# Patient Record
Sex: MALE | Race: WHITE | Employment: OTHER | URBAN - METROPOLITAN AREA
[De-identification: names, ages, dates, MRNs, and addresses within clinical notes are randomized per-mention and may not be internally consistent; named-entity substitution may affect disease eponyms.]

---

## 2017-01-10 ENCOUNTER — TRANSFERRED RECORDS (OUTPATIENT)
Dept: HEALTH INFORMATION MANAGEMENT | Facility: CLINIC | Age: 82
End: 2017-01-10

## 2017-01-16 ENCOUNTER — DOCUMENTATION ONLY (OUTPATIENT)
Dept: SURGERY | Facility: CLINIC | Age: 82
End: 2017-01-16

## 2017-02-10 ENCOUNTER — TELEPHONE (OUTPATIENT)
Dept: UROLOGY | Facility: CLINIC | Age: 82
End: 2017-02-10

## 2017-02-10 ENCOUNTER — TELEPHONE (OUTPATIENT)
Dept: SURGERY | Facility: CLINIC | Age: 82
End: 2017-02-10

## 2017-02-10 NOTE — TELEPHONE ENCOUNTER
RAN Wolff from [facility] calling.  She reports Jeff has called here a couple of times to get Pelvic Floor test results and he has been referred to his urologist for those.  Dawn Cintron ordered the test.  Per clinic note he was to follow up with Dawn after the test was done.  It's difficult for him to get here, I will check if Dawn can call him with results or if he needs to come see her?

## 2017-02-10 NOTE — TELEPHONE ENCOUNTER
I spoke to Jeff and let him know that he should call down to the colon rectal clinic.it looks like they wanted him to return to clinic for results.  I gave him the # so that he could call that clinic. Milka

## 2017-02-10 NOTE — TELEPHONE ENCOUNTER
Received a call from patient stating he wanted his results from Pelvic Floor Clinic testing.  Stated he contacted Colon Rectal clinic X 2 and was directed to call our office.  Per Colon Rectal Office note;  Patient was to get the testing and then follow up with Colon Rectal clinic regarding result.  Verified in EPIC that the Testing was ordered by Colon Rectal and therefore results would go to that office not Dr Garcia.    Placed call to [facility] Clinic and spoke with Triage and confirmed they will be getting the result and speaking with the ordering provider to determine how they wish to give results to the patient.  Via phone or needed another office appt.    Urology Clinic RN will call and let patient know that Colon Rectal Clinic has confirmed that results will come from them and they will be letting patient know result or needed appt etc.  (unknown by this RN how long the wait will be)    Mariella Aldana RN  [facility]

## 2017-02-10 NOTE — TELEPHONE ENCOUNTER
Reason for Call:  Request for results:    Name of test or procedure: pelvic floor exam    Date of test of procedure: 1/10/17    Location of the test or procedure: ? Specialty clinic down by [facility]    OK to leave the result message on voice mail or with a family member? YES    Phone number Patient can be reached at:  Home number on file [phone] (home)    Additional comments: pt calling stating he is wondering what the result are from his pelvic floor exam that was done on Jan 10th    Call taken on 2/10/2017 at 1:47 PM by Inge Owusu

## 2017-02-10 NOTE — TELEPHONE ENCOUNTER
I spoke to Jeff today. He had been referred to the colorectal surgeon. The colorectal surgeon ordered pelvic floor testing, however when Jeff called the office of the colorectal surgeons and they said that he should call us for the results. We did not see any Documentation in the chart or on Dr Jean Baptiste desk so will try to reach them at the pelvic floor clinic and if this can not be obtained from them then we will question Dr Garcia. Cee SEBASTIAN RN

## 2017-02-27 ENCOUNTER — DOCUMENTATION ONLY (OUTPATIENT)
Dept: SURGERY | Facility: CLINIC | Age: 82
End: 2017-02-27

## 2017-02-27 ENCOUNTER — TELEPHONE (OUTPATIENT)
Dept: SURGERY | Facility: CLINIC | Age: 82
End: 2017-02-27

## 2017-02-27 DIAGNOSIS — K59.02 CONSTIPATION DUE TO OUTLET DYSFUNCTION: Primary | ICD-10-CM

## 2017-02-27 NOTE — TELEPHONE ENCOUNTER
Called pt to relay Dawn Cintron's recommendation regarding his recent pelvic floor testing. Per Dawn due to his inability to fully evacuate he can try biofeedback of dulcolax suppositories. He is also welcome to follow up in clinic if he would like to discuss these options further. Pt states that he is willing to try the biofeedback, though he is not confident that this will be a good solution. Asked pt if he would prefer to come to clinic to discuss this in more detail with Dawn, but he declined. Stated he will try the biofeedback first. Told pt that a referral will be placed for this and that he will receive a call from the [facility] for scheduling. Encouraged pt to call back at any time if he feels like biofeedback is not working and would like to discuss other options. Pt verbalized understanding and is in agreement with plan. Yolanda VERDIN LPN

## 2017-03-14 ENCOUNTER — TELEPHONE (OUTPATIENT)
Dept: SURGERY | Facility: CLINIC | Age: 82
End: 2017-03-14

## 2017-03-14 ENCOUNTER — OFFICE VISIT (OUTPATIENT)
Dept: SURGERY | Facility: CLINIC | Age: 82
End: 2017-03-14

## 2017-03-14 VITALS
SYSTOLIC BLOOD PRESSURE: 153 MMHG | HEIGHT: 72 IN | HEART RATE: 90 BPM | WEIGHT: 216.4 LBS | BODY MASS INDEX: 29.31 KG/M2 | OXYGEN SATURATION: 97 % | DIASTOLIC BLOOD PRESSURE: 88 MMHG

## 2017-03-14 DIAGNOSIS — K59.02 CONSTIPATION DUE TO PELVIC FLOOR OUTLET OBSTRUCTION: Primary | ICD-10-CM

## 2017-03-14 RX ORDER — BISACODYL 10 MG
1 SUPPOSITORY, RECTAL RECTAL DAILY PRN
Qty: 30 SUPPOSITORY | Refills: 3 | Status: SHIPPED | OUTPATIENT
Start: 2017-03-14 | End: 2017-04-26

## 2017-03-14 ASSESSMENT — PAIN SCALES - GENERAL: PAINLEVEL: NO PAIN (0)

## 2017-03-14 NOTE — MR AVS SNAPSHOT
After Visit Summary   3/14/2017    Jeff Plasencia    MRN: [id number]           Patient Information     Date Of Birth          1923        Visit Information        Provider Department      3/14/2017 10:30 AM Dawn Mcpherson APRN 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 Fax to [phone].                  Who to contact     Please call your clinic at [phone] to:    Ask questions about your health    Make or cancel appointments    Discuss your medicines    Learn about your test results    Speak to your doctor   If you have compliments or concerns about an experience at your clinic, or if you wish to file a complaint, please contact [facility] Patient Relations at [phone] or email us at [email or web address]         Additional Information About Your Visit        MyChart Information     [employer] is an electronic gateway that provides easy, online access to your medical records. With [employer], you can request a clinic appointment, read your test results, renew a prescription or communicate with your care team.     To sign up for [employer] visit the website at [email or web address]   You will be asked to enter the access code listed below, as well as some personal information. Please follow the directions to create your username and password.     Your access code is: [email or web address]  Expires: 2017 10:47 AM     Your access code will  in 90 days. If you need help or a new code, please contact your [facility] Clinic or call [phone] for assistance.        Care EveryWhere ID     This is your Care EveryWhere ID. This could be used by other organizations to access your [place]  "medical records  [id number]        Your Vitals Were     Pulse Height Pulse Oximetry BMI (Body Mass Index)          90 5' 11.5\" 97% 29.76 kg/m2         Blood Pressure from Last 3 Encounters:   03/14/17 153/88   12/27/16 (!) 166/113   12/08/16 (!) 199/103    Weight from Last 3 Encounters:   03/14/17 216 lb 6.4 oz   12/27/16 209 lb 9.6 oz   11/22/16 203 lb              We Performed the Following     Pelvic Floor Referral PFC          Today's Medication Changes          These changes are accurate as of: 3/14/17 10:47 AM.  If you have any questions, ask your nurse or doctor.               Start taking these medicines.        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 Please contact your care team if you have questions.     COQ-10 PO   Stopped by:  Dawn Mcpherson APRN CNP                Where to get your medicines      These medications were sent to [facility] [address] [place] [address]  [address] [facility] [address]     Phone:  [phone]     bisacodyl 10 MG Suppository                Primary Care Provider Office Phone # Fax #    Lorenza Smith [phone]       [facility] [address] [facility] [address]        Thank you!     Thank you for choosing [facility] COLON AND RECTAL SURGERY  for your care. Our goal is always to provide you with excellent care. Hearing back from our patients is one way we can continue to improve our services. Please take a few minutes to complete the written survey that you may receive in the mail after your visit with us. Thank you!           "   Your Updated Medication List - Protect others around you: Learn how to safely use, store and throw away your medicines at www.disposemymeds.org.          This list is accurate as of: 3/14/17 10:47 AM.  Always use your most recent med list.                   Brand Name Dispense Instructions for use    aspirin 81 MG tablet     100    1/2 tab daily       bisacodyl 10 MG Suppository    DULCOLAX    30 suppository    Place 1 suppository (10 mg) rectally daily as needed for constipation       glucosamine chondroitin 500 complex Caps      DAILY       LUTEIN PO      Take  by mouth.       mometasone 50 MCG/ACT spray    NASONEX    1 Box    Spray 2 sprays into both nostrils daily       VITAMIN D PO      Take  by mouth.

## 2017-03-14 NOTE — TELEPHONE ENCOUNTER
Called pt to relay Dawn's recommendations to continue both the gatorade and the benfiber. Pt stated understanding of these recommendations. Encouraged pt to contact us with any further questions. Yolanda VERDIN LPN

## 2017-03-14 NOTE — TELEPHONE ENCOUNTER
Jeff is calling for Dawn Cintron.  He reports he was seen today but forgot to ask.  1. PCP instructed him to be on Gatorade for electrolytes.  Does he need to continue that?  2. On Bene fiber 2 teaspoons twice daily.  Is he suppose to continue that now that he's starting suppositories?    Will route to Dawn Guerrero's clinic nurse.

## 2017-03-14 NOTE — LETTER
"3/14/2017      RE: Jeff Plasencia  [address] [facility] [address]       Colon and Rectal Surgery Follow-Up Clinic Note    RE: Jeff Plasencia  : 1923  ENRIQUE: 3/14/2017    Jeff Plasencia is a very pleasant 89 year old male here for follow-up of obstructed defecation.   Please see my note from 16 for complete history.   Jeff presents today with his son and wife to discuss PFC testing and plan of care.    Interval history: Jeff continues to have the same symptoms of difficulty with evacuation since his colonoscopy in . He continues to have to manipulate outside his rectum in order to evacuate stool.     Assessment/Plan: 89 year old male with outlet obstruction.  PFC testing showed normal, sensation and paradox relaxation but he was unable to evacuate any of the defecography contrast. Biofeedback therapy or suppositories were recommended. He does not think that either of these things will help him as he is convinced that he has \"too much colon at the bottom\".  I again discussed these results with Jeff today. He is unsure which course he would like to pursue and would like to discuss with his family first. I will place orders for both. He can follow up with me as needed.  Patient's questions were answered to his stated satisfaction and he is in agreement with this plan.    Medical history:  Past Medical History   Diagnosis Date     BENIGN ESSENTIAL TREMOR      Did not tolerate propranolol     Calculus of kidney      Herpes zoster without mention of complication      Right T7-10      Impotence of organic origin      Parotid mass 3/26/2009     CT 3/09- 2.6x 2.8 x2.9 cm Soft tissue mass within the left parotid gland. Biopsies negative.      Undiagnosed cardiac murmurs 2007     Echo 10/08 normal.        Surgical history:  Past Surgical History   Procedure Laterality Date     Surgical history of -   1999     Right cataract surgery     Surgical history of -   1999     Hernia " repair     Surgical history of -   2001     Normal Colonoscopy     Surgical history of -   8/2002     Cryosurgery of the prostate     Surgical history of -   4/2003     Left cataract surgery     Colonoscopy  9/28/2011     Procedure:COLONOSCOPY; Colonoscopy  ; Surgeon:LAMAR ALLEN; Location:[facility]       Problem list:  Patient Active Problem List    Diagnosis Date Noted     CANCER   PROSTATE      Priority: High     Dx 2001. Grade 2+2, PSA 7.9. Cryosurgery 2002. Rx lupron 2002.        Orthostatic hypotension 01/20/2016     Priority: Medium     At high risk for falls 02/24/2014     Priority: Medium     Advanced directives, counseling/discussion 05/06/2011     Priority: Medium     Health care directive received and was notarized on 9/19/11   This document was sent to scanning on 10/11/2011 11:43 AM  Justina MENSAH CMA         Hyperlipidemia LDL goal <130 10/31/2010     Priority: Medium     Radiculopathy of leg 06/20/2011     Priority: Low     left foot drop with left radiculopathy per neuro evaluation 6/11       Memory loss 05/06/2011     Priority: Low     Hx of supraventricular tachycardia      Priority: Low     history of non-sustained SVT, slow nonsustained VT by holter 2001. Echo 2000 normal.       Dermatophytosis of body 05/27/2005     Priority: Low     Cruris, umbilicus  Problem list name updated by automated process. Provider to review         Medications:  Current Outpatient Prescriptions   Medication Sig Dispense Refill     VITAMIN D PO Take  by mouth.       LUTEIN PO Take  by mouth.       ASPIRIN 81 MG OR TABS 1/2 tab daily 100 3     GLUCOSAMINE CHONDR 500 COMPLEX OR CAPS DAILY  0     mometasone (NASONEX) 50 MCG/ACT nasal spray Spray 2 sprays into both nostrils daily (Patient not taking: Reported on 3/14/2017) 1 Box 2       Allergies:  Allergies   Allergen Reactions     Mustard Oil Anaphylaxis     Penicillins Anaphylaxis     Atenolol Rash     Cozaar [Losartan Potassium] Hives     Inderal [Propranolol Hcl]      " Nightmares, headache, chestpain     Lisinopril Cough       Family history:  Family History   Problem Relation Age of Onset     Hypertension Brother      DIABETES Mother      age 70s     CEREBROVASCULAR DISEASE Sister      Cancer - colorectal No family hx of        Social history:  Social History   Substance Use Topics     Smoking status: Never Smoker     Smokeless tobacco: Never Used     Alcohol use Yes      Comment: socially     Marital status: .    Nursing Notes:   Gary Trevino CMA  3/14/2017  9:54 AM  Signed  Chief Complaint   Patient presents with     RECHECK     Follow up       Vitals:    03/14/17 0949   BP: 153/88   BP Location: Left arm   Pulse: 90   SpO2: 97%   Weight: 216 lb 6.4 oz   Height: 5' 11.5\"       Body mass index is 29.76 kg/(m^2).    Gary Trevino CMA                             Physical Examination:  /88 (BP Location: Left arm)  Pulse 90  Ht 5' 11.5\"  Wt 216 lb 6.4 oz  SpO2 97%  BMI 29.76 kg/m2  General: alert, oriented, in no acute distress, sitting comfortably  The remainder of the exam was deferred today.    Total face to face time was 20 minutes, >50% counseling.    Dawn Bedoya NP-C  Colon and Rectal Surgery  [facility]      Dawn Bedoya, APRN CNP      "

## 2017-03-14 NOTE — PROGRESS NOTES
"Colon and Rectal Surgery Follow-Up Clinic Note    RE: Jeff Plasencia  : 1923  ENRIQUE: 3/14/2017    Jeff Plasencia is a very pleasant 89 year old male here for follow-up of obstructed defecation.   Please see my note from 16 for complete history.   Jeff presents today with his son and wife to discuss PFC testing and plan of care.    Interval history: Jeff continues to have the same symptoms of difficulty with evacuation since his colonoscopy in . He continues to have to manipulate outside his rectum in order to evacuate stool.     Assessment/Plan: 89 year old male with outlet obstruction.  PFC testing showed normal, sensation and paradox relaxation but he was unable to evacuate any of the defecography contrast. Biofeedback therapy or suppositories were recommended. He does not think that either of these things will help him as he is convinced that he has \"too much colon at the bottom\".  I again discussed these results with Jeff today. He is unsure which course he would like to pursue and would like to discuss with his family first. I will place orders for both. He can follow up with me as needed.  Patient's questions were answered to his stated satisfaction and he is in agreement with this plan.    Medical history:  Past Medical History   Diagnosis Date     BENIGN ESSENTIAL TREMOR      Did not tolerate propranolol     Calculus of kidney      Herpes zoster without mention of complication      Right T7-10      Impotence of organic origin      Parotid mass 3/26/2009     CT 3/09- 2.6x 2.8 x2.9 cm Soft tissue mass within the left parotid gland. Biopsies negative.      Undiagnosed cardiac murmurs 2007     Echo 10/08 normal.        Surgical history:  Past Surgical History   Procedure Laterality Date     Surgical history of -   1999     Right cataract surgery     Surgical history of -   1999     Hernia repair     Surgical history of -        Normal Colonoscopy     Surgical " history of -   8/2002     Cryosurgery of the prostate     Surgical history of -   4/2003     Left cataract surgery     Colonoscopy  9/28/2011     Procedure:COLONOSCOPY; Colonoscopy  ; Surgeon:LAMAR ALLEN; Location:[facility]       Problem list:  Patient Active Problem List    Diagnosis Date Noted     CANCER   PROSTATE      Priority: High     Dx 2001. Grade 2+2, PSA 7.9. Cryosurgery 2002. Rx lupron 2002.        Orthostatic hypotension 01/20/2016     Priority: Medium     At high risk for falls 02/24/2014     Priority: Medium     Advanced directives, counseling/discussion 05/06/2011     Priority: Medium     Health care directive received and was notarized on 9/19/11   This document was sent to scanning on 10/11/2011 11:43 AM  Justina MENSAH CMA         Hyperlipidemia LDL goal <130 10/31/2010     Priority: Medium     Radiculopathy of leg 06/20/2011     Priority: Low     left foot drop with left radiculopathy per neuro evaluation 6/11       Memory loss 05/06/2011     Priority: Low     Hx of supraventricular tachycardia      Priority: Low     history of non-sustained SVT, slow nonsustained VT by holter 2001. Echo 2000 normal.       Dermatophytosis of body 05/27/2005     Priority: Low     Cruris, umbilicus  Problem list name updated by automated process. Provider to review         Medications:  Current Outpatient Prescriptions   Medication Sig Dispense Refill     VITAMIN D PO Take  by mouth.       LUTEIN PO Take  by mouth.       ASPIRIN 81 MG OR TABS 1/2 tab daily 100 3     GLUCOSAMINE CHONDR 500 COMPLEX OR CAPS DAILY  0     mometasone (NASONEX) 50 MCG/ACT nasal spray Spray 2 sprays into both nostrils daily (Patient not taking: Reported on 3/14/2017) 1 Box 2       Allergies:  Allergies   Allergen Reactions     Mustard Oil Anaphylaxis     Penicillins Anaphylaxis     Atenolol Rash     Cozaar [Losartan Potassium] Hives     Inderal [Propranolol Hcl]      Nightmares, headache, chestpain     Lisinopril Cough       Family  "history:  Family History   Problem Relation Age of Onset     Hypertension Brother      DIABETES Mother      age 70s     CEREBROVASCULAR DISEASE Sister      Cancer - colorectal No family hx of        Social history:  Social History   Substance Use Topics     Smoking status: Never Smoker     Smokeless tobacco: Never Used     Alcohol use Yes      Comment: socially     Marital status: .    Nursing Notes:   Gary Trevino CMA  3/14/2017  9:54 AM  Signed  Chief Complaint   Patient presents with     RECHECK     Follow up       Vitals:    03/14/17 0949   BP: 153/88   BP Location: Left arm   Pulse: 90   SpO2: 97%   Weight: 216 lb 6.4 oz   Height: 5' 11.5\"       Body mass index is 29.76 kg/(m^2).    Gary Trevino CMA                             Physical Examination:  /88 (BP Location: Left arm)  Pulse 90  Ht 5' 11.5\"  Wt 216 lb 6.4 oz  SpO2 97%  BMI 29.76 kg/m2  General: alert, oriented, in no acute distress, sitting comfortably  The remainder of the exam was deferred today.    Total face to face time was 20 minutes, >50% counseling.    Dawn Cintron NP-C  Colon and Rectal Surgery  [facility]    "

## 2017-03-14 NOTE — NURSING NOTE
"Chief Complaint   Patient presents with     RECHECK     Follow up       Vitals:    03/14/17 0949   BP: 153/88   BP Location: Left arm   Pulse: 90   SpO2: 97%   Weight: 216 lb 6.4 oz   Height: 5' 11.5\"       Body mass index is 29.76 kg/(m^2).    Gary Trevino CMA                          "

## 2017-03-23 ENCOUNTER — ALLIED HEALTH/NURSE VISIT (OUTPATIENT)
Dept: FAMILY MEDICINE | Facility: CLINIC | Age: 82
End: 2017-03-23
Payer: MEDICARE

## 2017-03-23 DIAGNOSIS — Z23 ENCOUNTER FOR IMMUNIZATION: Primary | ICD-10-CM

## 2017-03-23 PROCEDURE — G0009 ADMIN PNEUMOCOCCAL VACCINE: HCPCS

## 2017-03-23 PROCEDURE — 90670 PCV13 VACCINE IM: CPT

## 2017-03-23 PROCEDURE — 99207 ZZC NO CHARGE NURSE ONLY: CPT

## 2017-03-23 NOTE — NURSING NOTE
Chief Complaint   Patient presents with     Imm/Inj     Prior to injection verified patient identity using patient's name and date of birth.   Patient instructed to remain in clinic for 20 minutes afterwards, and to report any adverse reaction to me immediately.

## 2017-03-23 NOTE — MR AVS SNAPSHOT
"              After Visit Summary   3/23/2017    Jeff Plasencia    MRN: [id number]           Patient Information     Date Of Birth          5/20/1923        Visit Information        Provider Department      3/23/2017 9:00 AM JESUS ROY CMA/LPN [facility]        Today's Diagnoses     Encounter for immunization    -  1       Follow-ups after your visit        Your next 10 appointments already scheduled     Mar 24, 2017  9:20 AM CDT   [facility] with Lorenza Smith CNP   [facility]    [address] [facility] [id number] [phone]              Who to contact     If you have questions or need follow up information about today's clinic visit or your schedule please contact [facility] directly at [phone].  Normal or non-critical lab and imaging results will be communicated to you by MyChart, letter or phone within 4 business days after the clinic has received the results. If you do not hear from us within 7 days, please contact the clinic through MyChart or phone. If you have a critical or abnormal lab result, we will notify you by phone as soon as possible.  Submit refill requests through [employer] or call your pharmacy and they will forward the refill request to us. Please allow 3 business days for your refill to be completed.          Additional Information About Your Visit        MyChart Information     [employer] lets you send messages to your doctor, view your test results, renew your prescriptions, schedule appointments and more. To sign up, go to [email or web address] . Click on \"Log in\" on the left side of the screen, which will take you to the Welcome page. Then click on \"Sign up Now\" on the right side of the page.     You will be asked to enter the access code listed below, as well as some personal information. Please follow the directions to create your username and password.     Your access code is: " [email or web address]  Expires: 2017 10:47 AM     Your access code will  in 90 days. If you need help or a new code, please call your [place] clinic or [phone].        Care EveryWhere ID     This is your Care EveryWhere ID. This could be used by other organizations to access your [place] medical records  [id number]         Blood Pressure from Last 3 Encounters:   17 153/88   16 (!) 166/113   16 (!) 199/103    Weight from Last 3 Encounters:   17 216 lb 6.4 oz (98.2 kg)   16 209 lb 9.6 oz (95.1 kg)   16 203 lb (92.1 kg)              We Performed the Following     ADMIN MEDICARE: Pneumococcal Vaccine ()     Pneumococcal vaccine 13 valent PCV13 IM (Prevnar) [06544]        Primary Care Provider Office Phone # Fax #    Lorenza Smith, [facility] [phone]       [address] [address]        Thank you!     Thank you for choosing [facility]  for your care. Our goal is always to provide you with excellent care. Hearing back from our patients is one way we can continue to improve our services. Please take a few minutes to complete the written survey that you may receive in the mail after your visit with us. Thank you!             Your Updated Medication List - Protect others around you: Learn how to safely use, store and throw away your medicines at www.disposemymeds.org.          This list is accurate as of: 3/23/17 11:10 AM.  Always use your most recent med list.                   Brand Name Dispense Instructions for use    aspirin 81 MG tablet     100    1/2 tab daily       bisacodyl 10 MG Suppository    DULCOLAX    30 suppository    Place 1 suppository (10 mg) rectally daily as needed for constipation       glucosamine chondroitin 500 complex Caps      DAILY       LUTEIN PO      Take  by mouth.       mometasone 50 MCG/ACT spray    NASONEX    1 Box    Spray 2 sprays into both nostrils daily        VITAMIN D PO      Take  by mouth.

## 2017-03-24 ENCOUNTER — OFFICE VISIT (OUTPATIENT)
Dept: FAMILY MEDICINE | Facility: CLINIC | Age: 82
End: 2017-03-24
Payer: MEDICARE

## 2017-03-24 VITALS
TEMPERATURE: 97.7 F | DIASTOLIC BLOOD PRESSURE: 78 MMHG | HEART RATE: 82 BPM | WEIGHT: 213.4 LBS | BODY MASS INDEX: 29.88 KG/M2 | OXYGEN SATURATION: 97 % | HEIGHT: 71 IN | SYSTOLIC BLOOD PRESSURE: 110 MMHG

## 2017-03-24 DIAGNOSIS — R25.1 TREMOR OF BOTH HANDS: ICD-10-CM

## 2017-03-24 DIAGNOSIS — I95.1 ORTHOSTATIC HYPOTENSION: ICD-10-CM

## 2017-03-24 DIAGNOSIS — H04.123 DRY EYES: ICD-10-CM

## 2017-03-24 DIAGNOSIS — H81.10 BPPV (BENIGN PAROXYSMAL POSITIONAL VERTIGO), UNSPECIFIED LATERALITY: ICD-10-CM

## 2017-03-24 DIAGNOSIS — Z91.81 AT HIGH RISK FOR FALLS: ICD-10-CM

## 2017-03-24 DIAGNOSIS — M62.81 GENERALIZED MUSCLE WEAKNESS: ICD-10-CM

## 2017-03-24 DIAGNOSIS — R26.89 BALANCE PROBLEMS: Primary | ICD-10-CM

## 2017-03-24 LAB
ALBUMIN SERPL-MCNC: 3.1 G/DL (ref 3.4–5)
ALP SERPL-CCNC: 79 U/L (ref 40–150)
ALT SERPL W P-5'-P-CCNC: 17 U/L (ref 0–70)
ANION GAP SERPL CALCULATED.3IONS-SCNC: 6 MMOL/L (ref 3–14)
AST SERPL W P-5'-P-CCNC: 23 U/L (ref 0–45)
BILIRUB SERPL-MCNC: 0.4 MG/DL (ref 0.2–1.3)
BUN SERPL-MCNC: 25 MG/DL (ref 7–30)
CALCIUM SERPL-MCNC: 8.5 MG/DL (ref 8.5–10.1)
CHLORIDE SERPL-SCNC: 106 MMOL/L (ref 94–109)
CO2 SERPL-SCNC: 27 MMOL/L (ref 20–32)
CREAT SERPL-MCNC: 1.03 MG/DL (ref 0.66–1.25)
GFR SERPL CREATININE-BSD FRML MDRD: 67 ML/MIN/1.7M2
GLUCOSE SERPL-MCNC: 125 MG/DL (ref 70–99)
POTASSIUM SERPL-SCNC: 4.1 MMOL/L (ref 3.4–5.3)
PROT SERPL-MCNC: 6.8 G/DL (ref 6.8–8.8)
SODIUM SERPL-SCNC: 139 MMOL/L (ref 133–144)

## 2017-03-24 PROCEDURE — 99214 OFFICE O/P EST MOD 30 MIN: CPT | Performed by: NURSE PRACTITIONER

## 2017-03-24 PROCEDURE — 36415 COLL VENOUS BLD VENIPUNCTURE: CPT | Performed by: NURSE PRACTITIONER

## 2017-03-24 PROCEDURE — 80053 COMPREHEN METABOLIC PANEL: CPT | Performed by: NURSE PRACTITIONER

## 2017-03-24 NOTE — PROGRESS NOTES
"  SUBJECTIVE:                                                    Jeff Plasencia is a 89 year old male who presents to clinic today for the following health issues:  Daughter spoke with RN who relayed message to me that his family is worried that he is driving again. They don't believe he should be. When I asked him if he was driving he said \"no\". His wife did not comment or disagree.    Dizziness     Onset: 2010    Description:   Do you feel faint:  no   Does it feel like the surroundings (bed, room) are moving: no   Unsteady/off balance: YES  Have you passed out or fallen: no     Intensity: moderate    Progression of Symptoms:  worsening    Accompanying Signs & Symptoms:  Heart palpitations: no   Nausea, vomiting: no   Weakness in arms or legs: no   Fatigue: no   Vision or speech changes: no   Ringing in ears (Tinnitus): YES- left ear  Hearing Loss: YES- left ear   History:   Head trauma/concussion hx: YES  Previous similar symptoms: no   Recent bleeding history: no     Precipitating factors:   Worse with activity or head movement: no   Any new medications (BP?): no   Alcohol/drug abuse/withdrawal: no     Alleviating factors:   Does staying in a fixed position give relief:  YES       Therapies Tried and outcome: none    Eyes checked a little over a year  Dry eyes - tearing    [facility]earing 10% [facility] loss [facility]    HPI:     Patient Active Problem List   Diagnosis     CANCER   PROSTATE     Dermatophytosis of body     Hx of supraventricular tachycardia     Hyperlipidemia LDL goal <130     Advanced directives, counseling/discussion     Memory loss     Radiculopathy of leg     At high risk for falls     Orthostatic hypotension       Current Outpatient Prescriptions:      bisacodyl (DULCOLAX) 10 MG Suppository, Place 1 suppository (10 mg) rectally daily as needed for constipation, Disp: 30 suppository, Rfl: 3     mometasone (NASONEX) 50 MCG/ACT nasal spray, Spray 2 sprays into both nostrils daily (Patient not taking: " "Reported on 3/14/2017), Disp: 1 Box, Rfl: 2     VITAMIN D PO, Take  by mouth., Disp: , Rfl:      LUTEIN PO, Take  by mouth., Disp: , Rfl:      ASPIRIN 81 MG OR TABS, 1/2 tab daily, Disp: 100, Rfl: 3     GLUCOSAMINE CHONDR 500 COMPLEX OR CAPS, DAILY, Disp: , Rfl: 0  Labs reviewed in EPIC      Reviewed and updated as needed this visit by Provider  Allergies  Meds  Problems      ROS:  Constitutional, HEENT, cardiovascular, pulmonary, gi and gu systems are negative, except as otherwise noted.  EYES: dry eyes  MUSCULOSKELETAL: generalized weakness, poor balance  NEURO: POSITIVE for dizziness/lightheadedness, tremor to bilateral hands and weakness generalized    PHYSICAL EXAM:   /78  Pulse 82  Temp 97.7  F (36.5  C) (Tympanic)  Ht 5' 11\" (1.803 m)  Wt 213 lb 6.4 oz (96.8 kg)  SpO2 97%  BMI 29.76 kg/m2  Body mass index is 29.76 kg/(m^2).  GENERAL APPEARANCE: healthy, alert and no distress  EYES: Eyes grossly normal to inspection, PERRL- lower right lid is sagging  RESP: lungs clear to auscultation - no rales, rhonchi or wheezes  CV: regular rates and rhythm, normal S1 S2, no S3 or S4 and no murmur, click or rub  MS: extremities normal- no gross deformities noted and peripheral pulses normal  NEURO: Normal strength and tone, mentation intact, speech normal, gait abnormal-Unable to heel-to-toe walk, Slow moving from seated to standing position, Slow gait with one sidestep needed, cranial nerves 2-12 intact, Romberg negative, rapid alternating movements slow, proprioception decreased- he is unable to stand with feet together/hands at side/eyes closed without major difficulty, and tremor Bilateral hand tremor L>R  PSYCH: mentation appears normal and affect normal/bright      ASSESSMENT & PLAN:     (R26.89) Balance problems  (primary encounter diagnosis)  Comment: chronic  Plan: PHYSICAL THERAPY REFERRAL, NEUROLOGY ADULT         REFERRAL   He might need can or walker          (M62.81) Generalized muscle " weakness  Comment: chronic  Plan: Physical Therapy referral    (H81.10) BPPV (benign paroxysmal positional vertigo), unspecified laterality  Comment: chronic  Plan: PHYSICAL THERAPY REFERRAL scheduled 3/28/2017        Vertigo therapy    (R25.1) Tremor of both hands  Comment: chronic  Plan: NEUROLOGY ADULT REFERRAL           (Z91.81) At high risk for falls  Comment:   Plan: PHYSICAL THERAPY REFERRAL        Improve strength/balance   Screen for walker/cane need    (H04.123) Dry eyes  Comment: chronic  Plan: recommend f/u with Ophthalmology    (I95.1) Orthostatic hypotension  Comment: stable  Plan: Comprehensive metabolic panel            Letter to DMV re: unsafe  sent today. I would recommend a road test be done to evaluate his ability to drive.    Patient Instructions   Schedule Physical Therapy appointment today    Call to schedule Neurology appointment in [place]    Refrain from driving at this time    High risk for falls- I'll have Physical Therapy to assess for walker/cane needs    Schedule annual eye appointment    Risks, benefits, side effects and rationale for treatment plan fully discussed with the patient and understanding expressed.    Lorenza Smith, DELFINO-BC  [facility]

## 2017-03-24 NOTE — MR AVS SNAPSHOT
After Visit Summary   3/24/2017    Jeff Plasencia    MRN: [id number]           Patient Information     Date Of Birth          5/20/1923        Visit Information        Provider Department      3/24/2017 9:20 AM Lorenza Smith CNP [facility]        Today's Diagnoses     Balance problems    -  1    BPPV (benign paroxysmal positional vertigo), unspecified laterality        At high risk for falls        Dry eyes        Tremor of both hands          Care Instructions    Schedule Physical Therapy appointment today    Call to schedule Neurology appointment in [place]    Refrain from driving at this time    High risk for falls- I'll have Physical Therapy to assess for walker/cane needs    Schedule annual eye appointment        Follow-ups after your visit        Additional Services     NEUROLOGY ADULT REFERRAL       Your provider has referred you to: [facility]: [facility] [place] [phone]   [email or web address]    Reason for Referral: Consult    Please be aware that coverage of these services is subject to the terms and limitations of your health insurance plan.  Call member services at your health plan with any benefit or coverage questions.      Please bring the following with you to your appointment:    (1) Any X-Rays, CTs or MRIs which have been performed.  Contact the facility where they were done to arrange for  prior to your scheduled appointment.    (2) List of current medications  (3) This referral request   (4) Any documents/labs given to you for this referral            PHYSICAL THERAPY REFERRAL       *This therapy referral will be filtered to a centralized scheduling office at [facility] and the patient will receive a call to schedule an appointment at a [place] location most convenient for them. *     [facility] provides Physical Therapy evaluation and treatment and many specialty  "services across the [place] system.  If requesting a specialty program, please choose from the list below.    If you have not heard from the scheduling office within 2 business days, please call [phone] for all locations, with the exception of [place], please call [phone].  Treatment: Evaluation & Treatment  Special Instructions/Modalities: generalized weakness, vertigo  Special Programs: Balance/Vestibular    Please be aware that coverage of these services is subject to the terms and limitations of your health insurance plan.  Call member services at your health plan with any benefit or coverage questions.      **Note to Provider:  If you are referring outside of [place] for the therapy appointment, please list the name of the location in the  special instructions  above, print the referral and give to the patient to schedule the appointment.                  Who to contact     If you have questions or need follow up information about today's clinic visit or your schedule please contact [facility] directly at [phone].  Normal or non-critical lab and imaging results will be communicated to you by MyChart, letter or phone within 4 business days after the clinic has received the results. If you do not hear from us within 7 days, please contact the clinic through [employer] or phone. If you have a critical or abnormal lab result, we will notify you by phone as soon as possible.  Submit refill requests through [employer] or call your pharmacy and they will forward the refill request to us. Please allow 3 business days for your refill to be completed.          Additional Information About Your Visit        [employer] Information     [employer] lets you send messages to your doctor, view your test results, renew your prescriptions, schedule appointments and more. To sign up, go to [email or web address] . Click on \"Log in\" on the left side of the screen, which will take you to the Welcome page. Then " "click on \"Sign up Now\" on the right side of the page.     You will be asked to enter the access code listed below, as well as some personal information. Please follow the directions to create your username and password.     Your access code is: [email or web address]  Expires: 2017 10:47 AM     Your access code will  in 90 days. If you need help or a new code, please call your [facility] clinic or [phone].        Care EveryWhere ID     This is your Care EveryWhere ID. This could be used by other organizations to access your [facility] medical records  [id number]        Your Vitals Were     Pulse Temperature Height Pulse Oximetry BMI (Body Mass Index)       82 97.7  F (36.5  C) (Tympanic) 5' 11\" (1.803 m) 97% 29.76 kg/m2        Blood Pressure from Last 3 Encounters:   17 110/78   17 153/88   16 (!) 166/113    Weight from Last 3 Encounters:   17 213 lb 6.4 oz (96.8 kg)   17 216 lb 6.4 oz (98.2 kg)   16 209 lb 9.6 oz (95.1 kg)              We Performed the Following     NEUROLOGY ADULT REFERRAL     PHYSICAL THERAPY REFERRAL        Primary Care Provider Office Phone # Fax #    Lorenza SmithCARMENZA [phone]       [place] [phone]        Thank you!     Thank you for choosing [facility]  for your care. Our goal is always to provide you with excellent care. Hearing back from our patients is one way we can continue to improve our services. Please take a few minutes to complete the written survey that you may receive in the mail after your visit with us. Thank you!             Your Updated Medication List - Protect others around you: Learn how to safely use, store and throw away your medicines at www.disposemymeds.org.          This list is accurate as of: 3/24/17 10:05 AM.  Always use your most recent med list.                   Brand Name Dispense Instructions for use    aspirin 81 MG tablet     " 100    1/2 tab daily       bisacodyl 10 MG Suppository    DULCOLAX    30 suppository    Place 1 suppository (10 mg) rectally daily as needed for constipation       glucosamine chondroitin 500 complex Caps      DAILY       LUTEIN PO      Take  by mouth.       mometasone 50 MCG/ACT spray    NASONEX    1 Box    Spray 2 sprays into both nostrils daily       VITAMIN D PO      Take  by mouth.

## 2017-03-24 NOTE — NURSING NOTE
"Chief Complaint   Patient presents with     Dizziness      - AUTHORIZATION/REFERRAL       Initial /78  Pulse 82  Temp 97.7  F (36.5  C) (Tympanic)  Ht 5' 11\" (1.803 m)  Wt 213 lb 6.4 oz (96.8 kg)  SpO2 97%  BMI 29.76 kg/m2 Estimated body mass index is 29.76 kg/(m^2) as calculated from the following:    Height as of this encounter: 5' 11\" (1.803 m).    Weight as of this encounter: 213 lb 6.4 oz (96.8 kg).  Medication Reconciliation: complete    "

## 2017-03-24 NOTE — PATIENT INSTRUCTIONS
Schedule Physical Therapy appointment today    Call to schedule Neurology appointment in [place]    Refrain from driving at this time    High risk for falls- I'll have Physical Therapy to assess for walker/cane needs    Schedule annual eye appointment

## 2017-03-24 NOTE — LETTER
[facility]  [address] [facility] [address]  Phone: [phone]  Fax: [phone]    March 28, 2017    Jeff JACOBS Nguyễnrichelle  [address] [facility] [address]          Dear  Luis Angel,    The results of your recent lab tests were:CMP is normal, except glucose was elevated. We should repeat a fasting glucose in 1 month. Schedule a lab only appointment.      Enclosed is a copy of these results.  If you have any further questions or problems, please contact our office.    Sincerely,      Lorenza Smith CNP/ tha

## 2017-03-24 NOTE — PROGRESS NOTES
"  SUBJECTIVE:                                                    Jeff Plasencia is a 89 year old male who presents to clinic today for the following health issues:  {Provider please address medication reconciliation discrepancies--rooming staff please delete if no med/rec issues}    Eye(s) Problem     Onset: ***    Description:   Location: {.:993022}  Pain: { :092058::\"no\"}  Redness: { :796902::\"no\"}    Accompanying Signs & Symptoms:  Discharge/mattering: { :623018::\"no\"}  Swelling: { :212215::\"no\"}  Visual changes: { :437262::\"no\"}  Fever: { :331322::\"no\"}  Nasal Congestion: { :986117::\"no\"}  Bothered by bright lights: { :071376::\"no\"}     History:   Trauma: { :215037}  Foreign body exposure: { :939480::\"no\"}    Precipitating factors:   Wearing contacts: { :676764::\"no\"}    Alleviating factors:  Improved by: ***       Therapies Tried and outcome: ***      {additional problems for provider to add:779483}    Problem list and histories reviewed & adjusted, as indicated.  Additional history: {NONE - AS DOCUMENTED:664817::\"as documented\"}    {HIST REVIEW/ LINKS 2:823804}    Reviewed and updated as needed this visit by clinical staff       Reviewed and updated as needed this visit by Provider         {PROVIDER CHARTING PREFERENCE:335422}    "

## 2017-03-24 NOTE — LETTER
[facility]  [address] [facility] [address]  Phone: [phone]  Fax: [phone]    March 24, 2017    Jeff Plasencia  [address] [facility] [address]              To Whom it May Concern,    I saw Jeff Plasencia today in clinic for concerns with balance problems, generalized weakness, and hand tremors. I feel he is unsafe to drive. I have referred him to Physical Therapy and Neurology.  I would recommend a road test in the future.                  Sincerely,      Lorenza Smith, CNP

## 2017-03-26 DIAGNOSIS — R73.9 BLOOD GLUCOSE ELEVATED: Primary | ICD-10-CM

## 2017-03-26 NOTE — PROGRESS NOTES
CMP is normal, except glucose was elevated. We should repeat a fasting glucose in 1 month. Schedule a lab only appointment.  Please notify him of these results and send a hard copy if not on myChart.   Thanks. DELFINO Ann

## 2017-03-28 ENCOUNTER — HOSPITAL ENCOUNTER (OUTPATIENT)
Dept: PHYSICAL THERAPY | Facility: CLINIC | Age: 82
Setting detail: THERAPIES SERIES
End: 2017-03-28
Attending: NURSE PRACTITIONER
Payer: MEDICARE

## 2017-03-28 PROCEDURE — 97162 PT EVAL MOD COMPLEX 30 MIN: CPT | Mod: GP | Performed by: PHYSICAL THERAPIST

## 2017-03-28 PROCEDURE — G8978 MOBILITY CURRENT STATUS: HCPCS | Mod: GP,CK | Performed by: PHYSICAL THERAPIST

## 2017-03-28 PROCEDURE — 97112 NEUROMUSCULAR REEDUCATION: CPT | Mod: GP | Performed by: PHYSICAL THERAPIST

## 2017-03-28 PROCEDURE — 40000840 ZZHC STATISTIC PT VESTIBULAR VISIT: Performed by: PHYSICAL THERAPIST

## 2017-03-28 PROCEDURE — G8979 MOBILITY GOAL STATUS: HCPCS | Mod: GP,CI | Performed by: PHYSICAL THERAPIST

## 2017-03-29 NOTE — PROGRESS NOTES
Physical Therapy Evaluation  03/28/17 1400   Quick Adds   Quick Adds Certification       Present No   General Information   Start of Care Date 03/28/17   Referring Physician Lorenza Smith   Orders Evaluate and Treat as Indicated   Additional Orders Vestibular   Order Date 03/24/17   Medical Diagnosis BPPV, Balance problems   Onset of illness/injury or Date of Surgery 03/24/17   Precautions/Limitations fall precautions   Surgical/Medical history reviewed Yes   Pertinent history of current problem (include personal factors and/or comorbidities that impact the POC) Reports that he has had head pain since a fall in Nov 2010 and has been seen 4 times in physical therapy since and it was helpful.  Reports that he was seen in [facility] for physical therapy in the past.  Reports now he is noticing that a decrease in balance recenlty.  Reports that he is noticing an increase in shakiness in both of his hands R > L.  Reports that he has been worse since his colonoscopy.  Reports that he isn't having any dizziness, however is having head pains on the top of his head.  Reports that no medical professionals are not listening to what he is saying and that none of their records are showing what he is telling the doctors. Patient is difficult to redirect.   Current Community Support Family/friend caregiver   Patient role/Employment history Retired   ADL Devices Other  (none)   Assistive Devices Comments none   Patient/Family Goals Statement decrease tremors and improve his balance   Fall Risk Screen   Fall screen completed by PT   Per patient - Fall 2 or more times in past year? No   Per patient - Fall with injury in past year? No   Timed Up and Go score (seconds) TUG 8 seconds   Is patient a fall risk? Yes;Department fall risk interventions implemented   Fall screen comments    System Outcome Measures   Outcome Measures BPPV   Dizziness Handicap Inventory (score out of 100) A decrease in  score by 17.18 or greater indicates a clinically significant change in symptoms. 36   Pain   Patient currently in pain Yes   Pain location top of head   Pain rating 2-3/10   Pain description Dull   Pain description comment constant   Cognitive Status Examination   Orientation person   Level of Consciousness alert   Follows Commands and Answers Questions 75% of the time;50% of the time   Personal Safety and Judgment impaired;impulsive   Cognitive Comment    Integumentary   Integumentary Other   Integumentary Comments Patient reports top of head pain and upon palpation there is a crest/bump on the top of his head   Posture   Posture Forward head position;Protracted shoulders   Palpation   Palpation TTP top of head   Range of Motion (ROM)   ROM Comment Decreased hip extension bilaterally, decreased knee extension bilaterally, neck ROM limited in all direction   Strength   Strength Comments 5/5 LE strength screen, hip abduction 4-/5, hip extension 4-/5   Bed Mobility   Bed Mobility Comments not assessed   Transfer Skills   Transfer Comments sit to stand: requires use of hands   Gait   Gait Gait Analysis   Gait Analysis   Gait Pattern Used swing-through gait   Gait Deviations Noted decreased tata;decreased stride length;increased stride width;decreased toe-to-floor clearance   Impairments Contributing to Gait Deviations impaired balance;decreased flexibility;impaired coordination;decreased ROM;decreased strength   Balance Special Tests   Balance Special Tests Barrett balance;Single leg stance right;Single leg stance left;Modified CTSIB Conditions;Romberg;Sharpened Romberg;Sit to stand reps;Timed up and go   Balance Special Tests Timed Up and Go   Seconds 8 Seconds   Comments staggered sideways when stepping around cone   Balance Special Tests Barrett Balance   Score out of 56 42   Balance Special Tests Single Leg Stance Right,   Right, seconds 0 Seconds   Balance Special Tests Single Leg Stance Left   Left, seconds 0  Seconds   Balance Special Tests Modified CTSIB Conditions   Condition 1, seconds 30 Seconds   Condition 2, seconds 30 Seconds   Condition 4, seconds 0 Seconds   Condition 5, seconds 0 Seconds   Balance Special Tests Romberg   Seconds 30 Seconds   Balance Special Tests Sharpened Romberg   Seconds 0 Seconds   Balance Special Tests Sit to Stand Reps in 30 Seconds   Comments 5 x sit to stand: 11.5 seconds (norm 14 sec)   Modality Interventions   Planned Modality Interventions TENS;Microcurrent Electrical Stimulation;Ultrasound;Cryotherapy;Thermotherapy: Hydrocollator Packs;Iontophoresis   Planned Therapy Interventions   Planned Therapy Interventions ADL retraining;balance training;bed mobility training;gait training;joint mobilization;neuromuscular re-education;ROM;strengthening;stretching;manual therapy   Clinical Impression   Criteria for Skilled Therapeutic Interventions Met yes, treatment indicated   PT Diagnosis Balance deficits   Influenced by the following impairments weakness, decreased balance, decreased ROM   Functional limitations due to impairments difficulty walking, standing balance, difficulties with transfers   Clinical Presentation Evolving/Changing   Clinical Presentation Rationale tremor, cancer, orthostatic hypotension   Clinical Decision Making (Complexity) Moderate complexity   Therapy Frequency 2 times/Week   Predicted Duration of Therapy Intervention (days/wks) 8 weeks   Risk & Benefits of therapy have been explained Yes   Patient, Family & other staff in agreement with plan of care Yes   Clinical Impression Comments Patient was difficult to redirect and this lead to increased evaluation time.  Patient will have DGI testing done at next session.  The patient scored mild risk of falls on Barrett testing and recommended that the patient use a cane for safety.  Falls risk booklet was provided.  Patient was also encouraged to schedule his neurology consult, feel that the tremor is affecting his balance.   Patient would benefit from continued PT to address above deficits and promotre ros.   Education Assessment   Preferred Learning Style Listening;Demonstration;Pictures/video   Barriers to Learning No barriers   GOALS   PT Eval Goals 1;2;3   Goal 1   Goal Identifier Barrett   Goal Description Patient will score a 48/56 on the Barrett testing in order to be at a low falls risk and to have increased safety with ambulation.   Target Date 05/24/17   Goal 2   Goal Identifier Sit to stand   Goal Description Patient will be able to perform 5 x sit to stand test in 11.5 seconds without the use of hands in order to have increased safety with sit to stand.   Target Date 05/24/17   Goal 3   Goal Identifier TUG   Goal Description Patient will be able to perform the TUG test in under 12 second with being able to navigate obstacles without a loss of balance in order to be safe with community ambulation.   Target Date 05/24/17   Total Evaluation Time   Total Evaluation Time (Minutes) 45 min (increased time due to need to redirect patient)   Therapy Certification   Certification date from 03/28/17   Certification date to 05/24/17   Medical Diagnosis Balance deficits   Certification I certify the need for these services furnished under this plan of treatment and while under my care.  (Physician co-signature of this document indicates review and certification of the therapy plan).   Please contact me with any questions or concerns.    Thank you for your referral,     Cintia Garcia, PT, DPT, CLT  Physical Therapist & Certified Lymphedema Therapist  [address] [address] [phone]

## 2017-03-29 NOTE — PROGRESS NOTES
[facility]        OUTPATIENT PHYSICAL THERAPY FUNCTIONAL EVALUATION  PLAN OF TREATMENT FOR OUTPATIENT REHABILITATION  (COMPLETE FOR INITIAL CLAIMS ONLY)  Patient's Last Name, First Name, M.I.  YOB: 1923  NguyễnJeff peoples  REYNALDO     Provider's Name   [facility]   Medical Record No.  [id number]     Start of Care Date:  03/28/17   Onset Date:  03/24/17   Type:     _X__PT   ____OT  ____SLP Medical Diagnosis:  Balance deficits     PT Diagnosis:  Balance deficits Visits from SOC:  1                              __________________________________________________________________________________  Plan of Treatment/Functional Goals:  ADL retraining, balance training, bed mobility training, gait training, joint mobilization, neuromuscular re-education, ROM, strengthening, stretching, manual therapy     TENS, Microcurrent Electrical Stimulation, Ultrasound, Cryotherapy, Thermotherapy: Hydrocollator Packs, Iontophoresis     GOALS  Barrett  Patient will score a 48/56 on the Barrett testing in order to be at a low falls risk and to have increased safety with ambulation.  05/24/17    Sit to stand  Patient will be able to perform 5 x sit to stand test in 11.5 seconds without the use of hands in order to have increased safety with sit to stand.  05/24/17    TUG  Patient will be able to perform the TUG test in under 12 second with being able to navigate obstacles without a loss of balance in order to be safe with community ambulation.  05/24/17      Therapy Frequency:  2 times/Week   Predicted Duration of Therapy Intervention:  8 weeks    Cintia Garcia, PT                                    I CERTIFY THE NEED FOR THESE SERVICES FURNISHED UNDER        THIS PLAN OF TREATMENT AND WHILE UNDER MY CARE     (Physician co-signature of this document indicates review and certification of the therapy  plan).                Certification Date From:  03/28/17   Certification Date To:  05/24/17    Referring Provider:  Lorenza Smith    Initial Assessment  See Epic Evaluation- Start of Care Date: 03/28/17

## 2017-04-03 ENCOUNTER — HOSPITAL ENCOUNTER (OUTPATIENT)
Dept: PHYSICAL THERAPY | Facility: CLINIC | Age: 82
Setting detail: THERAPIES SERIES
End: 2017-04-03
Attending: NURSE PRACTITIONER
Payer: MEDICARE

## 2017-04-03 PROCEDURE — 97112 NEUROMUSCULAR REEDUCATION: CPT | Mod: GP | Performed by: PHYSICAL THERAPIST

## 2017-04-03 PROCEDURE — 40000718 ZZHC STATISTIC PT DEPARTMENT ORTHO VISIT: Performed by: PHYSICAL THERAPIST

## 2017-04-06 ENCOUNTER — HOSPITAL ENCOUNTER (OUTPATIENT)
Dept: PHYSICAL THERAPY | Facility: CLINIC | Age: 82
Setting detail: THERAPIES SERIES
End: 2017-04-06
Attending: NURSE PRACTITIONER
Payer: MEDICARE

## 2017-04-06 PROCEDURE — 40000718 ZZHC STATISTIC PT DEPARTMENT ORTHO VISIT: Performed by: PHYSICAL THERAPIST

## 2017-04-06 PROCEDURE — 97112 NEUROMUSCULAR REEDUCATION: CPT | Mod: GP | Performed by: PHYSICAL THERAPIST

## 2017-04-10 ENCOUNTER — HOSPITAL ENCOUNTER (OUTPATIENT)
Dept: PHYSICAL THERAPY | Facility: CLINIC | Age: 82
Setting detail: THERAPIES SERIES
End: 2017-04-10
Attending: NURSE PRACTITIONER
Payer: MEDICARE

## 2017-04-10 PROCEDURE — 97112 NEUROMUSCULAR REEDUCATION: CPT | Mod: GP | Performed by: PHYSICAL THERAPIST

## 2017-04-10 PROCEDURE — 40000718 ZZHC STATISTIC PT DEPARTMENT ORTHO VISIT: Performed by: PHYSICAL THERAPIST

## 2017-04-13 ENCOUNTER — HOSPITAL ENCOUNTER (OUTPATIENT)
Dept: PHYSICAL THERAPY | Facility: CLINIC | Age: 82
Setting detail: THERAPIES SERIES
End: 2017-04-13
Attending: NURSE PRACTITIONER
Payer: MEDICARE

## 2017-04-13 PROCEDURE — 97112 NEUROMUSCULAR REEDUCATION: CPT | Mod: GP | Performed by: PHYSICAL THERAPIST

## 2017-04-13 PROCEDURE — 40000840 ZZHC STATISTIC PT VESTIBULAR VISIT: Performed by: PHYSICAL THERAPIST

## 2017-04-17 ENCOUNTER — HOSPITAL ENCOUNTER (OUTPATIENT)
Dept: PHYSICAL THERAPY | Facility: CLINIC | Age: 82
Setting detail: THERAPIES SERIES
End: 2017-04-17
Attending: NURSE PRACTITIONER
Payer: MEDICARE

## 2017-04-17 PROCEDURE — 97112 NEUROMUSCULAR REEDUCATION: CPT | Mod: GP | Performed by: PHYSICAL THERAPIST

## 2017-04-17 PROCEDURE — 40000840 ZZHC STATISTIC PT VESTIBULAR VISIT: Performed by: PHYSICAL THERAPIST

## 2017-04-24 ENCOUNTER — HOSPITAL ENCOUNTER (OUTPATIENT)
Dept: PHYSICAL THERAPY | Facility: CLINIC | Age: 82
Setting detail: THERAPIES SERIES
End: 2017-04-24
Attending: NURSE PRACTITIONER
Payer: MEDICARE

## 2017-04-24 PROCEDURE — 97112 NEUROMUSCULAR REEDUCATION: CPT | Mod: GP | Performed by: PHYSICAL THERAPIST

## 2017-04-24 PROCEDURE — 40000718 ZZHC STATISTIC PT DEPARTMENT ORTHO VISIT: Performed by: PHYSICAL THERAPIST

## 2017-04-25 ENCOUNTER — OFFICE VISIT (OUTPATIENT)
Dept: FAMILY MEDICINE | Facility: CLINIC | Age: 82
End: 2017-04-25
Payer: MEDICARE

## 2017-04-25 VITALS
RESPIRATION RATE: 20 BRPM | TEMPERATURE: 98.4 F | BODY MASS INDEX: 29.4 KG/M2 | HEIGHT: 71 IN | WEIGHT: 210 LBS | SYSTOLIC BLOOD PRESSURE: 118 MMHG | HEART RATE: 76 BPM | DIASTOLIC BLOOD PRESSURE: 74 MMHG

## 2017-04-25 DIAGNOSIS — R25.1 TREMOR: ICD-10-CM

## 2017-04-25 DIAGNOSIS — L98.9 BENIGN SKIN LESION OF NECK: ICD-10-CM

## 2017-04-25 DIAGNOSIS — Z91.81 AT HIGH RISK FOR FALLS: Primary | ICD-10-CM

## 2017-04-25 DIAGNOSIS — L85.3 DRY SKIN: ICD-10-CM

## 2017-04-25 PROCEDURE — 99213 OFFICE O/P EST LOW 20 MIN: CPT | Performed by: NURSE PRACTITIONER

## 2017-04-25 NOTE — PATIENT INSTRUCTIONS
Continue with Physical Therapy    Follow-up with Neurology tomorrow 4/26/2017    Monitor little skin lump on back of neck- if large, changes color, starts bleeding- come back for reevaluation

## 2017-04-25 NOTE — MR AVS SNAPSHOT
After Visit Summary   4/25/2017    Jeff Plasencia    MRN: [id number]           Patient Information     Date Of Birth          5/20/1923        Visit Information        Provider Department      4/25/2017 9:20 AM Lorenza Smith CNP [facility]        Today's Diagnoses     At high risk for falls    -  1      Care Instructions    Continue with Physical Therapy    Follow-up with Neurology tomorrow 4/26/2017    Monitor little skin lump on back of neck- if large, changes color, starts bleeding- come back for reevaluation            Follow-ups after your visit        Your next 10 appointments already scheduled     Apr 26, 2017  8:00 AM CDT   New Visit with Breanne Garcias MD   [facility] ([facility]    [address] [address]   [phone]            May 01, 2017  2:15 PM CDT   Treatment 60 with Cintia Garcia PT   [facility] ([facility])    [address] [id number] [phone]              Who to contact     If you have questions or need follow up information about today's clinic visit or your schedule please contact [facility] directly at [phone].  Normal or non-critical lab and imaging results will be communicated to you by MyChart, letter or phone within 4 business days after the clinic has received the results. If you do not hear from us within 7 days, please contact the clinic through MyChart or phone. If you have a critical or abnormal lab result, we will notify you by phone as soon as possible.  Submit refill requests through [employer] or call your pharmacy and they will forward the refill request to us. Please allow 3 business days for your refill to be completed.          Additional Information About Your Visit        [facility] Information     [employer] lets you send messages to your doctor, view your test results, renew your prescriptions,  "schedule appointments and more. To sign up, go to [email or web address] . Click on \"Log in\" on the left side of the screen, which will take you to the Welcome page. Then click on \"Sign up Now\" on the right side of the page.     You will be asked to enter the access code listed below, as well as some personal information. Please follow the directions to create your username and password.     Your access code is: [email or web address]  Expires: 2017 10:47 AM     Your access code will  in 90 days. If you need help or a new code, please call your [place] clinic or [phone].        Care EveryWhere ID     This is your Care EveryWhere ID. This could be used by other organizations to access your [place] medical records  [id number]        Your Vitals Were     Pulse Temperature Respirations Height BMI (Body Mass Index)       76 98.4  F (36.9  C) (Tympanic) 20 5' 11\" (1.803 m) 29.29 kg/m2        Blood Pressure from Last 3 Encounters:   17 118/74   17 110/78   17 153/88    Weight from Last 3 Encounters:   17 210 lb (95.3 kg)   17 213 lb 6.4 oz (96.8 kg)   17 216 lb 6.4 oz (98.2 kg)              Today, you had the following     No orders found for display       Primary Care Provider Office Phone # Fax #    Lorenzaemily Smith [facility] [phone]       [address] [address]        Thank you!     Thank you for choosing [facility]  for your care. Our goal is always to provide you with excellent care. Hearing back from our patients is one way we can continue to improve our services. Please take a few minutes to complete the written survey that you may receive in the mail after your visit with us. Thank you!             Your Updated Medication List - Protect others around you: Learn how to safely use, store and throw away your medicines at www.disposemymeds.org.          This list is accurate as of: 17 " 10:02 AM.  Always use your most recent med list.                   Brand Name Dispense Instructions for use    aspirin 81 MG tablet     100    1/2 tab daily       bisacodyl 10 MG Suppository    DULCOLAX    30 suppository    Place 1 suppository (10 mg) rectally daily as needed for constipation       glucosamine chondroitin 500 complex Caps      DAILY       LUTEIN PO      Take  by mouth.       mometasone 50 MCG/ACT spray    NASONEX    1 Box    Spray 2 sprays into both nostrils daily       VITAMIN D PO      Take  by mouth.
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
## 2017-04-25 NOTE — NURSING NOTE
"Chief Complaint   Patient presents with     Derm Problem     Physical Therapy     Recheck        Initial /74  Pulse 76  Temp 98.4  F (36.9  C) (Tympanic)  Resp 20  Wt 210 lb (95.3 kg)  BMI 29.29 kg/m2 Estimated body mass index is 29.29 kg/(m^2) as calculated from the following:    Height as of 3/24/17: 5' 11\" (1.803 m).    Weight as of this encounter: 210 lb (95.3 kg).  Medication Reconciliation: complete  "

## 2017-04-26 ENCOUNTER — OFFICE VISIT (OUTPATIENT)
Dept: NEUROLOGY | Facility: CLINIC | Age: 82
End: 2017-04-26
Payer: MEDICARE

## 2017-04-26 VITALS — HEART RATE: 87 BPM | TEMPERATURE: 98.4 F | DIASTOLIC BLOOD PRESSURE: 84 MMHG | SYSTOLIC BLOOD PRESSURE: 157 MMHG

## 2017-04-26 DIAGNOSIS — E05.90 HYPERTHYROIDISM: ICD-10-CM

## 2017-04-26 DIAGNOSIS — R51.9 CHRONIC DAILY HEADACHE: Primary | ICD-10-CM

## 2017-04-26 DIAGNOSIS — R26.89 BALANCE PROBLEMS: ICD-10-CM

## 2017-04-26 DIAGNOSIS — G25.0 ESSENTIAL TREMOR: ICD-10-CM

## 2017-04-26 LAB
TSH SERPL DL<=0.005 MIU/L-ACNC: 3.21 MU/L (ref 0.4–4)
VIT B12 SERPL-MCNC: 617 PG/ML (ref 193–986)

## 2017-04-26 PROCEDURE — 82607 VITAMIN B-12: CPT | Performed by: PSYCHIATRY & NEUROLOGY

## 2017-04-26 PROCEDURE — 99204 OFFICE O/P NEW MOD 45 MIN: CPT | Performed by: PSYCHIATRY & NEUROLOGY

## 2017-04-26 PROCEDURE — 84443 ASSAY THYROID STIM HORMONE: CPT | Performed by: PSYCHIATRY & NEUROLOGY

## 2017-04-26 PROCEDURE — 36415 COLL VENOUS BLD VENIPUNCTURE: CPT | Performed by: PSYCHIATRY & NEUROLOGY

## 2017-04-26 RX ORDER — TOPIRAMATE 25 MG/1
TABLET, FILM COATED ORAL
Qty: 120 TABLET | Refills: 3 | Status: SHIPPED | OUTPATIENT
Start: 2017-04-26 | End: 2017-05-25 | Stop reason: SINTOL

## 2017-04-26 NOTE — PROGRESS NOTES
INITIAL NEUROLOGY CONSULTATION    DATE OF VISIT: 4/26/2017  MRN: [id number]  PATIENT NAME: Jeff Plasencia  YOB: 1923    REFERRING PROVIDER: Lorenza Smith    Chief Complaint   Patient presents with     Consult     Tremor and balance issues.  Previously followed by Dr. Cavazos.  Referred by Lorenza Smith CNP.       SUBJECTIVE:                                                      HPI:   Jeff Plasencia is a 89 year old male who presents for evaluation of tremor as a consultation request from Lorenza Smith CNP. The patient was seen by Dr. Cavazos after a fall in 2011and for Left foot drop/radiculopthy. Dr. Cavazos noted at that time that the patient would benefit from balance training. He also noted cognitive impairment and benign essential tremor. The patient has been to physical therapy, as recently as one month ago, though I do not see any notes beyond the initial evaluation documentation.     Today the patient is accompanied by his family (wife and daughter). The patient tells me that he is mainly here for headache. He has daily pain on the top of his head that radiates around the back of his skull. The patient tells me that all of his problems started when he fell in 2010 and hit his head (unclear if LOC). He had some subsequent neck pain after the fall as well. He feels that since then the head pain has become more persistent and bothersome. He recalls therapy courses at least 3 times in the past for balance since the fall. He denies injury to the lower back. The patient clarifies that he has been doing physical therapy regularly and is now down to 1x/week. He feels that this is helping with the balance issue. Regarding the foot drop, he says this was due to simvastatin. Now he has just mild weakness in the foot. He denies dizziness or vertiginous symptoms. He denies associated symptoms with his headaches, its just a constant pain. Last fall was a mechanical fall about 1.5 months ago. No  injury reported.     The patient's wife says her main concern is the tremor, which is getting in the way of his eating and writing now. He denies ever trying a medication for tremor, but in reviewing his chart I see a note that he did not tolerate propranolol. He denies stiffness/rigidity. The patient's daughter says that he has occasional tremor in the head and legs as well. Family history includes tremor in his father.    He has some problems with chewing due to dental issues. He denies visual changes with the headaches. He says he recently had cataract surgery. He is due for a follow-up eye exam.    Past Medical History:   Diagnosis Date     BENIGN ESSENTIAL TREMOR     Did not tolerate propranolol     Calculus of kidney      Herpes zoster without mention of complication     Right T7-10 2003     Impotence of organic origin      Parotid mass 3/26/2009    CT 3/09- 2.6x 2.8 x2.9 cm Soft tissue mass within the left parotid gland. Biopsies negative.      Undiagnosed cardiac murmurs 2/9/2007    Echo 10/08 normal.      Past Surgical History:   Procedure Laterality Date     COLONOSCOPY  9/28/2011    Procedure:COLONOSCOPY; Colonoscopy  ; Surgeon:LAMAR ALLEN; Location:[facility]     SURGICAL HISTORY OF -   11/1999    Right cataract surgery     SURGICAL HISTORY OF -   12/1999    Hernia repair     SURGICAL HISTORY OF -   2001    Normal Colonoscopy     SURGICAL HISTORY OF -   8/2002    Cryosurgery of the prostate     SURGICAL HISTORY OF -   4/2003    Left cataract surgery         Current Outpatient Prescriptions on File Prior to Visit:  mometasone (NASONEX) 50 MCG/ACT nasal spray Spray 2 sprays into both nostrils daily (Patient taking differently: Spray 2 sprays into both nostrils daily as needed )   VITAMIN D PO Take by mouth daily    LUTEIN PO Take by mouth daily    ASPIRIN 81 MG OR TABS 1/2 tab daily   GLUCOSAMINE CHONDR 500 COMPLEX OR CAPS DAILY     No current facility-administered medications on file prior to visit.    Allergies   Allergen Reactions     Mustard Oil Anaphylaxis     Penicillins Anaphylaxis     Atenolol Rash     Cozaar [Losartan Potassium] Hives     Inderal [Propranolol Hcl]      Nightmares, headache, chestpain     Lisinopril Cough        Problem (# of Occurrences) Relation (Name,Age of Onset)    CEREBROVASCULAR DISEASE (1) Sister (Victorina)    DIABETES (1) Mother (Annalisa): age 70s    Hypertension (1) Brother (Art)       Negative family history of: Cancer - colorectal        Social History   Substance Use Topics     Smoking status: Never Smoker     Smokeless tobacco: Never Used     Alcohol use Yes      Comment: socially       REVIEW OF SYSTEMS:                                                      10-point review of systems is negative except as mentioned above in HPI.     EXAM:                                                      Physical Exam:   Vitals: /84 (BP Location: Right arm, Patient Position: Chair, Cuff Size: Adult Large)  Pulse 87  Temp 98.4  F (36.9  C) (Oral)  BMI= There is no height or weight on file to calculate BMI.  GENERAL: NAD.  Neurologic:  MENTAL STATUS: Alert, attentive. Speech is fluent, slow and deliberate. Tangential at times. Normal comprehension. Mini-cog: 3/5 (missed 2 words on recall). Fair concentration. Adequate fund of knowledge.   CRANIAL NERVES: Discs flat. Visual fields intact to confrontation. Pupils equally, round and reactive to light. Facial sensation and movement normal. EOM full. Hearing intact to conversation. Trapezius strength intact. Palate moves symmetrically. Tongue midline.  MOTOR: Slight weakness with dorsiflexion and eversion of the Left foot. Otherwise strength is 5/5 in proximal and distal muscle groups of upper and lower extremities. Tone and bulk normal.   DTRs: 1+ in UEs. Brisk at patellae. Trace/absent ankle jerks..  Babinski down-going bilaterally.   SENSATION: Normal light touch and pinprick. Intact proprioception. Vibration: Decreased at both  ankles.   COORDINATION: Terminal intention tremor, otherwise normal finger nose finger. Finger tapping normal.   STATION AND GAIT: Romberg negative. Gait is cautious and slightly wide.   CV: RRR. S1, S2.   NECK: No bruits.  Postural and action tremor in the hands bilaterally.    Relevant Data:  Head CT (5.10.2011):  IMPRESSION: Mild to moderate cerebral atrophy. No evidence for  intracranial hemorrhage or skull fracture.    Images reviewed by me. Agree with radiologist's read.    Normal renal function on recent labs.    ASSESSMENT and PLAN:                                                      Assessment and Plan:     ICD-10-CM    1. Chronic daily headache R51 MR Brain w/o & w Contrast     topiramate (TOPAMAX) 25 MG tablet   2. Balance problems R26.89 Vitamin B12     Methylmalonic acid     MR Brain w/o & w Contrast   3. Essential tremor G25.0 Vitamin B12     Methylmalonic acid     topiramate (TOPAMAX) 25 MG tablet   4. Hyperthyroidism E05.90 TSH with free T4 reflex    rule out       Mr. Plasencia is a pleasant 89 yo man with history of prostate cancer, chronic balance problems and tremor. The patient mainly wanted to discuss headaches and tremor in clinic today. The patient has tremor consistent with Essential Tremor, as previously diagnosed. Regarding the head pain, some of it sounds like tension-type headache. I am not sure if it relates to the fall several years ago. I would like to do an MRI of his brain to make sure there is not a structural reason for his headaches and balance problems. We discussed treatment options to address both the tremor and headaches. I think that Topamax might be a good option to try. Patient is open to this. Side effects discussed. We will also check some basic labs today. The patient understands and agrees with the plan.     Patient Instructions:  Labs today.  MRI brain.  We will notify you of the results.  Topamax for the headaches/tremor: 25mg at bedtime. Increase as instructed.  *Information provided.  Return to clinic in 3 months.     Total Time: 45 minutes were spent with the patient. More than 50% of the time spent on counseling (as described above in Assessment and Plan) /coordinating the care.    Braenne Garcias MD  Neurology    CC: Lorenza Smith, CNP

## 2017-04-26 NOTE — NURSING NOTE
"Chief Complaint   Patient presents with     Consult     Tremor and balance issues.  Previously followed by Dr. Cavazos.  Referred by Lorenza Smith CNP.       Initial /84 (BP Location: Right arm, Patient Position: Chair, Cuff Size: Adult Large)  Pulse 87  Temp 98.4  F (36.9  C) (Oral) Estimated body mass index is 29.29 kg/(m^2) as calculated from the following:    Height as of 4/25/17: 5' 11\" (1.803 m).    Weight as of 4/25/17: 210 lb (95.3 kg).  Medication Reconciliation: complete    Patient prefers to be contacted: letter  Okay to leave detailed message on voicemail: n/a    Annia ALFARO-CMA    "

## 2017-04-26 NOTE — MR AVS SNAPSHOT
After Visit Summary   4/26/2017    Jeff Plasencia    MRN: [id number]           Patient Information     Date Of Birth          5/20/1923        Visit Information        Provider Department      4/26/2017 8:00 AM Breanne Garcias MD [facility]        Today's Diagnoses     Chronic daily headache    -  1    Balance problems        Essential tremor        Hyperthyroidism          Care Instructions    Plan:    Labs today.  MRI brain.  We will notify you of the results.  Topamax for the headaches/tremor: 25mg at bedtime. Increase as instructed. *Information provided.  Return to clinic in 3 months.           Follow-ups after your visit        Your next 10 appointments already scheduled     May 01, 2017  2:15 PM CDT   Treatment 60 with Cintia Garcia PT   [facility] ([facility])    [address] [place] [id number]   [phone]              Future tests that were ordered for you today     Open Future Orders        Priority Expected Expires Ordered    MR Brain w/o & w Contrast Routine  4/26/2018 4/26/2017            Who to contact     If you have questions or need follow up information about today's clinic visit or your schedule please contact [facility] directly at [phone].  Normal or non-critical lab and imaging results will be communicated to you by MyChart, letter or phone within 4 business days after the clinic has received the results. If you do not hear from us within 7 days, please contact the clinic through MyChart or phone. If you have a critical or abnormal lab result, we will notify you by phone as soon as possible.  Submit refill requests through [employer] or call your pharmacy and they will forward the refill request to us. Please allow 3 business days for your refill to be completed.          Additional Information About Your Visit        MyChart Information     [employer] lets you send messages to your doctor, view  "your test results, renew your prescriptions, schedule appointments and more. To sign up, go to [email or web address] . Click on \"Log in\" on the left side of the screen, which will take you to the Welcome page. Then click on \"Sign up Now\" on the right side of the page.     You will be asked to enter the access code listed below, as well as some personal information. Please follow the directions to create your username and password.     Your access code is: [email or web address]  Expires: 2017 10:47 AM     Your access code will  in 90 days. If you need help or a new code, please call your [place] clinic or [phone].        Care EveryWhere ID     This is your Care EveryWhere ID. This could be used by other organizations to access your [place] medical records  [id number]        Your Vitals Were     Pulse Temperature                87 98.4  F (36.9  C) (Oral)           Blood Pressure from Last 3 Encounters:   17 157/84   17 118/74   17 110/78    Weight from Last 3 Encounters:   17 210 lb (95.3 kg)   17 213 lb 6.4 oz (96.8 kg)   17 216 lb 6.4 oz (98.2 kg)              We Performed the Following     Methylmalonic acid     TSH with free T4 reflex     Vitamin B12          Today's Medication Changes          These changes are accurate as of: 17  8:35 AM.  If you have any questions, ask your nurse or doctor.               Start taking these medicines.        Dose/Directions    topiramate 25 MG tablet   Commonly known as:  TOPAMAX   Used for:  Essential tremor, Chronic daily headache   Started by:  Breanne Garcias MD        Take 1 tablet (25 mg) at bedtime for 1 week, then 1 tablet twice daily for 1 week, then 1 tablet in AM and 2 in PM for 1 week, then 2 tablets twice daily.   Quantity:  120 tablet   Refills:  3         These medicines have changed or have updated prescriptions.        Dose/Directions    mometasone 50 MCG/ACT spray   Commonly known as:  NASONEX   This may " have changed:    - when to take this  - reasons to take this   Used for:  Acute rhinitis        Dose:  2 spray   Spray 2 sprays into both nostrils daily   Quantity:  1 Box   Refills:  2            Where to get your medicines      Some of these will need a paper prescription and others can be bought over the counter.  Ask your nurse if you have questions.     Bring a paper prescription for each of these medications     topiramate 25 MG tablet                Primary Care Provider Office Phone # Fax #    Lorenza Smith, [facility] [phone]       [address] [address]        Thank you!     Thank you for choosing [facility]  for your care. Our goal is always to provide you with excellent care. Hearing back from our patients is one way we can continue to improve our services. Please take a few minutes to complete the written survey that you may receive in the mail after your visit with us. Thank you!             Your Updated Medication List - Protect others around you: Learn how to safely use, store and throw away your medicines at www.disposemymeds.org.          This list is accurate as of: 4/26/17  8:35 AM.  Always use your most recent med list.                   Brand Name Dispense Instructions for use    aspirin 81 MG tablet     100    1/2 tab daily       glucosamine chondroitin 500 complex Caps      DAILY       LUTEIN PO      Take by mouth daily       mometasone 50 MCG/ACT spray    NASONEX    1 Box    Spray 2 sprays into both nostrils daily       topiramate 25 MG tablet    TOPAMAX    120 tablet    Take 1 tablet (25 mg) at bedtime for 1 week, then 1 tablet twice daily for 1 week, then 1 tablet in AM and 2 in PM for 1 week, then 2 tablets twice daily.       VITAMIN D PO      Take by mouth daily

## 2017-04-26 NOTE — PATIENT INSTRUCTIONS
Plan:    Labs today.  MRI brain.  We will notify you of the results.  Topamax for the headaches/tremor: 25mg at bedtime. Increase as instructed. *Information provided.  Return to clinic in 3 months.

## 2017-04-28 LAB — METHYLMALONATE SERPL-SCNC: 0.19

## 2017-05-01 ENCOUNTER — HOSPITAL ENCOUNTER (OUTPATIENT)
Dept: PHYSICAL THERAPY | Facility: CLINIC | Age: 82
Setting detail: THERAPIES SERIES
End: 2017-05-01
Attending: NURSE PRACTITIONER
Payer: MEDICARE

## 2017-05-01 ENCOUNTER — HOSPITAL ENCOUNTER (OUTPATIENT)
Dept: MRI IMAGING | Facility: CLINIC | Age: 82
Discharge: HOME | End: 2017-05-01
Attending: PSYCHIATRY & NEUROLOGY | Admitting: PSYCHIATRY & NEUROLOGY
Payer: MEDICARE

## 2017-05-01 DIAGNOSIS — R26.89 BALANCE PROBLEMS: ICD-10-CM

## 2017-05-01 DIAGNOSIS — R51.9 CHRONIC DAILY HEADACHE: ICD-10-CM

## 2017-05-01 LAB
CREAT BLD-MCNC: 1 MG/DL (ref 0.66–1.25)
GFR SERPL CREATININE-BSD FRML MDRD: 70 ML/MIN/1.7M2

## 2017-05-01 PROCEDURE — 82565 ASSAY OF CREATININE: CPT

## 2017-05-01 PROCEDURE — 25500064 ZZH RX 255 OP 636: Performed by: PSYCHIATRY & NEUROLOGY

## 2017-05-01 PROCEDURE — 97112 NEUROMUSCULAR REEDUCATION: CPT | Mod: GP | Performed by: PHYSICAL THERAPIST

## 2017-05-01 PROCEDURE — A9585 GADOBUTROL INJECTION: HCPCS | Performed by: PSYCHIATRY & NEUROLOGY

## 2017-05-01 PROCEDURE — 70553 MRI BRAIN STEM W/O & W/DYE: CPT

## 2017-05-01 PROCEDURE — 40000718 ZZHC STATISTIC PT DEPARTMENT ORTHO VISIT: Performed by: PHYSICAL THERAPIST

## 2017-05-01 RX ORDER — GADOBUTROL 604.72 MG/ML
9 INJECTION INTRAVENOUS ONCE
Status: COMPLETED | OUTPATIENT
Start: 2017-05-01 | End: 2017-05-01

## 2017-05-01 RX ADMIN — GADOBUTROL 9 ML: 604.72 INJECTION INTRAVENOUS at 11:10

## 2017-05-01 NOTE — PROGRESS NOTES
Please advise Jeff Plasencia,  1923, that his MRI shows age-related changes, but nothing significant or to explain his symptoms.   [phone] (home)     Thank you,  Breanne Garcias

## 2017-05-01 NOTE — PROGRESS NOTES
Please advise Jeff Plasencia,  1923, that his lab results were normal.   [phone] (home)   Breanne Garcias

## 2017-05-09 ENCOUNTER — TELEPHONE (OUTPATIENT)
Dept: NEUROLOGY | Facility: CLINIC | Age: 82
End: 2017-05-09

## 2017-05-09 NOTE — TELEPHONE ENCOUNTER
He's currently taking 25 mg of Topamax twice daily.  Tomorrow he's due to increase to 25 mg in the morning and 50 mg at bedtime.    He's currently not in PT, but has an open order to return if he chooses.      His other concern is that he's had frequent bouts of red, irritated eyes.  He's read the information regarding side effects of Topamax and has found that this can be one of the side effects.  He said he has no history of allergies that could be bothering his eyes right now.  He's using OTC drops (artificial tears), which seems to help, but he's even using them when he gets up at night.  [facility], but wonders what to do about the Topamax.

## 2017-05-09 NOTE — TELEPHONE ENCOUNTER
Please clarify Jeff's current Topamax dose. Also, does he want a referral to physical therapy?    CY

## 2017-05-09 NOTE — TELEPHONE ENCOUNTER
Lets have him taper off of the Topamax (back down to 25mg QD for one week and then stop). Follow-up to discuss other options for headache/tremor.     CY

## 2017-05-09 NOTE — TELEPHONE ENCOUNTER
Reason for Call:  Patient is calling in states that his balance is getting worse despite medication prescribed    He states that PT was very helpful     Detailed comments: please advise above    Phone Number Patient can be reached at: Home number on file [phone] (home)    Best Time: any    Can we leave a detailed message on this number? YES    Call taken on 5/9/2017 at 9:50 AM by Rosana Soto

## 2017-05-09 NOTE — TELEPHONE ENCOUNTER
Pt calling and reports he has been experiencing increased balance problems ever since starting on the Topamax.  He reports that he felt that PT had really helped with balance issues.  He continues to have headaches and nothing has changed with this problem, he thought the Topamax was suppose to help with this problem as well.  I advised pt that I will bring his concern to Dr. Garcias and if she is going to change medication he uses the [facility] in [place].    Carolann Toledo  [facility] RN

## 2017-05-25 ENCOUNTER — OFFICE VISIT (OUTPATIENT)
Dept: NEUROLOGY | Facility: CLINIC | Age: 82
End: 2017-05-25
Payer: MEDICARE

## 2017-05-25 VITALS — TEMPERATURE: 98.3 F | SYSTOLIC BLOOD PRESSURE: 124 MMHG | HEART RATE: 93 BPM | DIASTOLIC BLOOD PRESSURE: 67 MMHG

## 2017-05-25 DIAGNOSIS — G44.329 CHRONIC POST-TRAUMATIC HEADACHE, NOT INTRACTABLE: ICD-10-CM

## 2017-05-25 DIAGNOSIS — G25.0 ESSENTIAL TREMOR: ICD-10-CM

## 2017-05-25 DIAGNOSIS — M54.2 CERVICALGIA: Primary | ICD-10-CM

## 2017-05-25 PROCEDURE — 99214 OFFICE O/P EST MOD 30 MIN: CPT | Performed by: PSYCHIATRY & NEUROLOGY

## 2017-05-25 NOTE — MR AVS SNAPSHOT
After Visit Summary   5/25/2017    Jeff Plasencia    MRN: [id number]           Patient Information     Date Of Birth          5/20/1923        Visit Information        Provider Department      5/25/2017 12:45 PM Breanne Garcias MD [facility]        Today's Diagnoses     Cervicalgia    -  1    Chronic post-traumatic headache, not intractable          Care Instructions    Plan:    Cervical spine MRI.  Referral to Pain Clinic for injections.  Let me know if your tremor worsens.   Return to clinic after the above (2-3 months).           Follow-ups after your visit        Additional Services     PAIN [facility] CENTER ([place]) REFERRAL       Your provider has referred you to the [facility].    Reason for Referral: Procedure or injection only - patient will be contacted within 24 hrs to schedule: Block: Occipital Nerve Block    Please complete the following questions:    What is your diagnosis for the patient's pain? Cervicalgia and headaches (some features of ON)    Do you have any specific questions for the pain specialist? No    Are there any red flags that may impact the assessment or management of the patient? None    **ANY DIAGNOSTIC TESTS THAT ARE NOT IN EPIC SHOULD BE SENT TO THE PAIN CENTER**    Please note the Pre-Op Pain Consult must be scheduled 2-3 weeks prior to the patient's surgery.  Patient's trying to schedule within 2 weeks of surgery may not be accommodated.     Pre-Op Pain Consults are only good for 30 days.    REGARDING OPIOID MEDICATIONS:  We will always address appropriateness of opioid pain medications, but we generally will not automatically take on a prescribing role. When we do take on prescribing of opioids for chronic pain, it is in collaboration with the referring physician for an intermediate period of time (months), with an expectation that the primary physician or provider will assume the prescribing role if medications are effective  at stable doses with demonstrated compliance.  Therefore, please do not assume that your prescribing responsibilities end on the day of pain clinic consultation.  Is this agreeable to you? YES    For any questions, contact the [facility] at [phone].    Please be aware that coverage of these services is subject to the terms and limitations of your health insurance plan.  Call member services at your health plan with any benefit or coverage questions.      Please bring the following with you to your appointment:    (1) Any X-Rays, CTs or MRIs which have been performed.  Contact the facility where they were done to arrange for  prior to your scheduled appointment.    (2) List of current medications   (3) This referral request   (4) Any documents/labs given to you for this referral                  Follow-up notes from your care team     Return in about 3 months (around 8/25/2017).      Future tests that were ordered for you today     Open Future Orders        Priority Expected Expires Ordered    MR Cervical Spine w/o Contrast Routine  5/25/2018 5/25/2017            Who to contact     If you have questions or need follow up information about today's clinic visit or your schedule please contact [facility] directly at [phone].  Normal or non-critical lab and imaging results will be communicated to you by MyChart, letter or phone within 4 business days after the clinic has received the results. If you do not hear from us within 7 days, please contact the clinic through MyChart or phone. If you have a critical or abnormal lab result, we will notify you by phone as soon as possible.  Submit refill requests through [employer] or call your pharmacy and they will forward the refill request to us. Please allow 3 business days for your refill to be completed.          Additional Information About Your Visit        [employer] Information     [employer] lets you send messages to your  "doctor, view your test results, renew your prescriptions, schedule appointments and more. To sign up, go to [email or web address] . Click on \"Log in\" on the left side of the screen, which will take you to the Welcome page. Then click on \"Sign up Now\" on the right side of the page.     You will be asked to enter the access code listed below, as well as some personal information. Please follow the directions to create your username and password.     Your access code is: [email or web address]  Expires: 2017 10:47 AM     Your access code will  in 90 days. If you need help or a new code, please call your [place] clinic or [phone].        Care EveryWhere ID     This is your Care EveryWhere ID. This could be used by other organizations to access your [place] medical records  [id number]        Your Vitals Were     Pulse Temperature                93 98.3  F (36.8  C) (Oral)           Blood Pressure from Last 3 Encounters:   17 124/67   17 157/84   17 118/74    Weight from Last 3 Encounters:   17 210 lb (95.3 kg)   17 213 lb 6.4 oz (96.8 kg)   17 216 lb 6.4 oz (98.2 kg)              We Performed the Following     PAIN [facility]EMENT C[facility] ([place]) REFERRAL          Today's Medication Changes          These changes are accurate as of: 17  1:18 PM.  If you have any questions, ask your nurse or doctor.               These medicines have changed or have updated prescriptions.        Dose/Directions    mometasone 50 MCG/ACT spray   Commonly known as:  NASONEX   This may have changed:    - when to take this  - reasons to take this   Used for:  Acute rhinitis        Dose:  2 spray   Spray 2 sprays into both nostrils daily   Quantity:  1 Box   Refills:  2         Stop taking these medicines if you haven't already. Please contact your care team if you have questions.     topiramate 25 MG tablet   Commonly known as:  TOPAMAX                    Primary Care Provider Office Phone # " Fax #    Lorenza SmithCARMENZA [phone]       [place] [address] [facility] [address]        Thank you!     Thank you for choosing [facility]  for your care. Our goal is always to provide you with excellent care. Hearing back from our patients is one way we can continue to improve our services. Please take a few minutes to complete the written survey that you may receive in the mail after your visit with us. Thank you!             Your Updated Medication List - Protect others around you: Learn how to safely use, store and throw away your medicines at www.disposemymeds.org.          This list is accurate as of: 5/25/17  1:18 PM.  Always use your most recent med list.                   Brand Name Dispense Instructions for use    aspirin 81 MG tablet     100    1/2 tab daily       glucosamine chondroitin 500 complex Caps      DAILY       LUTEIN PO      Take by mouth daily       mometasone 50 MCG/ACT spray    NASONEX    1 Box    Spray 2 sprays into both nostrils daily       VITAMIN D PO      Take by mouth daily

## 2017-05-25 NOTE — PATIENT INSTRUCTIONS
Plan:    Cervical spine MRI.  Referral to Pain Clinic for injections.  Let me know if your tremor worsens.   Return to clinic after the above (2-3 months).

## 2017-05-25 NOTE — NURSING NOTE
"Initial /67  Pulse 93  Temp 98.3  F (36.8  C) (Oral) Estimated body mass index is 29.29 kg/(m^2) as calculated from the following:    Height as of 4/25/17: 5' 11\" (1.803 m).    Weight as of 4/25/17: 210 lb (95.3 kg). .    Gissell Clay LPN    "

## 2017-05-25 NOTE — PROGRESS NOTES
ESTABLISHED PATIENT NEUROLOGY NOTE    DATE OF VISIT: 5/25/2017  MRN: [id number]  PATIENT NAME: Jeff Plasencia  YOB: 1923    Chief Complaint   Patient presents with     RECHECK     Recheck headaches and tremors     SUBJECTIVE:                                                      HISTORY OF PRESENT ILLNESS:  Jeff is here for follow up regarding headaches and tremor. The patient has history of Essential Tremor. There is also history of Left foot drop/radiculopathy, previously evaluated by Dr. Cavazos. When I met the patient about one month ago, he was mainly concerned about headaches. We decided to start Topamax, however he felt that his chronic balance problems worsened with this medication so I advised him to taper off of the medication. We did do a brain MRI in the interim, which was unremarkable. He had seen some improvement of balance through physical therapy. He is here on urgent basis to discuss other options for his headaches.     The patient tells me that the headaches are the same. He mentions again that his headaches started after he fell in 2010. Today, he tells me a little different story than last time. He says he has chronic neck pain since the accident. He had previously told me that the neck pain had been temporary and improved with physical therapy after his fall. He says that he has pain radiating up the back of his head, particularly when leaning back in his recliner, with the neck in a certain position. He did have a cervical XR completed in 2010, around the time of his fall, which showed multilevel degenerative changes.     Regarding the tremor, he feels that this is tolerable/not bothersome.       He tells me that the Topamax made his balance worse, tremor worse and did not improve the headaches. He says that both the tremor and the balance improved once he got off of the medication.    In reviewing his chart, I am reminded that the patient was unable to tolerate propranolol. He  has a history of difficulty with multiple medications.     CURRENT MEDICATIONS:     Current Outpatient Prescriptions on File Prior to Visit:  mometasone (NASONEX) 50 MCG/ACT nasal spray Spray 2 sprays into both nostrils daily (Patient taking differently: Spray 2 sprays into both nostrils daily as needed )   VITAMIN D PO Take by mouth daily    LUTEIN PO Take by mouth daily    ASPIRIN 81 MG OR TABS 1/2 tab daily   GLUCOSAMINE CHONDR 500 COMPLEX OR CAPS DAILY     No current facility-administered medications on file prior to visit.     RECENT DIAGNOSTIC STUDIES:   Imaging:  Results for orders placed or performed during the hospital encounter of 05/01/17   MR Brain w/o & w Contrast    Narrative    MR BRAIN W/O & W CONTRAST 5/1/2017 11:56 AM     HISTORY: chronic headaches and balance, Headache, Other abnormalities  of gait and mobility    TECHNIQUE: Multiplanar, multisequence MRI of the brain without and  with 9ml gadavist  IV contrast material.    COMPARISON: None.    FINDINGS:  There is generalized atrophy of the brain.  White matter  changes are present in the cerebral hemispheres that are consistent  with small vessel ischemic disease in this age patient. There is no  evidence of hemorrhage, mass, demyelination, acute infarct, or  anomaly. There are no gadolinium enhancing lesions. Mucosal thickening  is present in both maxillary sinuses and several ethmoid sinuses.. The  arteries at the base of the brain and the dural venous sinuses appear  patent.     The acoustic pathways appear normal. No inner ear lesions are seen.  Mastoids are normally aerated.      Impression    IMPRESSION:   1. No acute pathology. No bleed, mass, or acute infarcts are seen.  2. Generalized atrophy of the brain.  White matter changes in the  cerebral hemispheres consistent with small vessel ischemic disease in  this age patient.    QIANA RODRIGUEZ MD   Creatinine POCT   Result Value Ref Range    Creatinine 1.0 0.66 - 1.25 mg/dL    GFR Estimate 70  >60 mL/min/1.7m2    GFR Estimate If Black 84 >60 mL/min/1.7m2     Cervical XR (11.22.10):  IMPRESSION: Severe degenerative change of the cervical spine, most  notable from C4-C7.    Imaging reviewed by me. Agree with radiology read.   REVIEW OF SYSTEMS:                                                      10-point review of systems is negative except as mentioned above in HPI.     EXAM:                                                      Physical Exam:   Vitals: /67  Pulse 93  Temp 98.3  F (36.8  C) (Oral)  BMI= There is no height or weight on file to calculate BMI.  HEENT: No TTP of neck or scalp. Sclerae are erythematous.   Focused Neurologic:  MENTAL STATUS: Alert, attentive. Speech is fluent. Perseverative. Normal comprehension. Fair concentration. Adequate fund of knowledge.   CRANIAL NERVES: Patient does not tolerate fundoscopy. Facial movement normal. EOM full. Hearing intact to conversation. Sternocleidomastoids and trapezius strength intact.  COORDINATION: Tremor with FNF bilaterally. Bilateral hand tremor, variable amplitude, with posture.   CV: RRR. S1, S2.   NECK: No bruits.    ASSESSMENT and PLAN:                                                      Assessment and Plan:    ICD-10-CM    1. Cervicalgia M54.2 PAIN [facility]GEMENT [facility] ([place] REFERRAL     MR Cervical Spine w/o Contrast   2. Chronic post-traumatic headache, not intractable G44.329 [facility] ([place] REFERRAL     MR Cervical Spine w/o Contrast   3. Essential tremor G25.0        Mr. Plasencia is a pleasant 89 yo man with history of prostate cancer and chronic balance problems seen in neurology for headache and tremor. Unfortunately he had adverse effects from the Topamax. The patient mainly wanted to discuss neck pain that has been intermittently present since his fall in 2011. We reviewed neck imaging from that time and his recent brain MRI results. I would like to have repeat cervical imaging completed.     In terms of  managing the pain/headaches, this time he describes symptoms consistent with occipital neuralgia, however he does not really have any scalp tenderness. I think he may benefit from localized injections/ONB. We reviewed his history of poor tolerance/side effects with multiple medications and I am inclined to avoid trying another oral preventative. The patient's tremor is not really bothersome at this time, so again, we will try to avoid medications for this. The patient understands and agrees with the plan.     Patient Instructions:  Cervical spine MRI.  Referral to Pain Clinic for injections.  Let me know if your tremor worsens.   Return to clinic after the above (2-3 months).     Total Time: 25 minutes were spent with the patient. More than 50% of the time spent on counseling (as described above in Assessment and Plan) /coordinating the care.    Breanne Garcias MD  Neurology

## 2017-05-31 ENCOUNTER — HOSPITAL ENCOUNTER (OUTPATIENT)
Dept: MRI IMAGING | Facility: CLINIC | Age: 82
Discharge: HOME | End: 2017-05-31
Attending: PSYCHIATRY & NEUROLOGY | Admitting: PSYCHIATRY & NEUROLOGY
Payer: MEDICARE

## 2017-05-31 DIAGNOSIS — M54.2 CERVICALGIA: ICD-10-CM

## 2017-05-31 DIAGNOSIS — G44.329 CHRONIC POST-TRAUMATIC HEADACHE, NOT INTRACTABLE: ICD-10-CM

## 2017-05-31 PROCEDURE — 72141 MRI NECK SPINE W/O DYE: CPT

## 2017-06-02 NOTE — PROGRESS NOTES
Please advise Jeff Plasencia,  1923, that his cervical MRI does show degenerative/arthritic changes that could be contributing to his pain. If things do not improve with the injections through Pain clinic, we may want to consult with a spinal specialist as well.   [phone] (home)   Breanne Garcias

## 2017-06-16 ENCOUNTER — OFFICE VISIT (OUTPATIENT)
Dept: FAMILY MEDICINE | Facility: CLINIC | Age: 82
End: 2017-06-16
Payer: MEDICARE

## 2017-06-16 VITALS
HEART RATE: 87 BPM | RESPIRATION RATE: 17 BRPM | WEIGHT: 211 LBS | DIASTOLIC BLOOD PRESSURE: 68 MMHG | BODY MASS INDEX: 29.43 KG/M2 | TEMPERATURE: 97.2 F | SYSTOLIC BLOOD PRESSURE: 118 MMHG | OXYGEN SATURATION: 97 %

## 2017-06-16 PROCEDURE — 99212 OFFICE O/P EST SF 10 MIN: CPT | Performed by: NURSE PRACTITIONER

## 2017-06-16 NOTE — PATIENT INSTRUCTIONS
[facility] drivers evaluation [phone], usually $350 (generally private pay).

## 2017-06-16 NOTE — MR AVS SNAPSHOT
"              After Visit Summary   2017    Jeff Plasencia    MRN: [id number]           Patient Information     Date Of Birth          1923        Visit Information        Provider Department      2017 1:40 PM Lorenza Smith CNP [facility]        Care [facility] drivers evaluation [phone], usually $350 (generally private pay).                  Follow-ups after your visit        Who to contact     If you have questions or need follow up information about today's clinic visit or your schedule please contact [facility] directly at [phone].  Normal or non-critical lab and imaging results will be communicated to you by MyChart, letter or phone within 4 business days after the clinic has received the results. If you do not hear from us within 7 days, please contact the clinic through [employer] or phone. If you have a critical or abnormal lab result, we will notify you by phone as soon as possible.  Submit refill requests through Medallia or call your pharmacy and they will forward the refill request to us. Please allow 3 business days for your refill to be completed.          Additional Information About Your Visit        MyChart Information     Medallia lets you send messages to your doctor, view your test results, renew your prescriptions, schedule appointments and more. To sign up, go to [email or web address] . Click on \"Log in\" on the left side of the screen, which will take you to the Welcome page. Then click on \"Sign up Now\" on the right side of the page.     You will be asked to enter the access code listed below, as well as some personal information. Please follow the directions to create your username and password.     Your access code is: [email or web address]  Expires: 2017  2:03 PM     Your access code will  in 90 days. If you need help or a new code, please call your [facility] or [phone].        Care EveryWhere " ID     This is your Care EveryWhere ID. This could be used by other organizations to access your [place] medical records  [id number]        Your Vitals Were     Pulse Temperature Respirations Pulse Oximetry BMI (Body Mass Index)       87 97.2  F (36.2  C) (Tympanic) 17 97% 29.43 kg/m2        Blood Pressure from Last 3 Encounters:   06/16/17 118/68   05/25/17 124/67   04/26/17 157/84    Weight from Last 3 Encounters:   06/16/17 211 lb (95.7 kg)   04/25/17 210 lb (95.3 kg)   03/24/17 213 lb 6.4 oz (96.8 kg)              Today, you had the following     No orders found for display         Today's Medication Changes          These changes are accurate as of: 6/16/17  2:03 PM.  If you have any questions, ask your nurse or doctor.               These medicines have changed or have updated prescriptions.        Dose/Directions    mometasone 50 MCG/ACT spray   Commonly known as:  NASONEX   This may have changed:    - when to take this  - reasons to take this   Used for:  Acute rhinitis        Dose:  2 spray   Spray 2 sprays into both nostrils daily   Quantity:  1 Box   Refills:  2                Primary Care Provider Office Phone # Fax #    Lorenza Smith [facility] [phone]       [address] [address]        Thank you!     Thank you for choosing [facility]  for your care. Our goal is always to provide you with excellent care. Hearing back from our patients is one way we can continue to improve our services. Please take a few minutes to complete the written survey that you may receive in the mail after your visit with us. Thank you!             Your Updated Medication List - Protect others around you: Learn how to safely use, store and throw away your medicines at www.disposemymeds.org.          This list is accurate as of: 6/16/17  2:03 PM.  Always use your most recent med list.                   Brand Name Dispense Instructions for use     aspirin 81 MG tablet     100    1/2 tab daily       glucosamine chondroitin 500 complex Caps      DAILY       LUTEIN PO      Take by mouth daily       mometasone 50 MCG/ACT spray    NASONEX    1 Box    Spray 2 sprays into both nostrils daily       VITAMIN D PO      Take by mouth daily

## 2017-06-16 NOTE — NURSING NOTE
"Chief Complaint   Patient presents with     Consult     wants to discuss driving privledges       Initial /68 (BP Location: Right arm, Patient Position: Chair, Cuff Size: Adult Regular)  Pulse 87  Temp 97.2  F (36.2  C) (Tympanic)  Resp 17  Wt 211 lb (95.7 kg)  SpO2 97%  BMI 29.43 kg/m2 Estimated body mass index is 29.43 kg/(m^2) as calculated from the following:    Height as of 4/25/17: 5' 11\" (1.803 m).    Weight as of this encounter: 211 lb (95.7 kg).  Medication Reconciliation: complete    Health Maintenance that is potentially due pending provider review:  Will discuss with provider    n/a    "
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
## 2017-08-02 ENCOUNTER — RADIANT APPOINTMENT (OUTPATIENT)
Dept: GENERAL RADIOLOGY | Facility: CLINIC | Age: 82
End: 2017-08-02
Attending: PEDIATRICS
Payer: MEDICARE

## 2017-08-02 ENCOUNTER — OFFICE VISIT (OUTPATIENT)
Dept: ORTHOPEDICS | Facility: CLINIC | Age: 82
End: 2017-08-02
Payer: MEDICARE

## 2017-08-02 VITALS
HEIGHT: 71 IN | BODY MASS INDEX: 29.54 KG/M2 | SYSTOLIC BLOOD PRESSURE: 160 MMHG | WEIGHT: 211 LBS | DIASTOLIC BLOOD PRESSURE: 85 MMHG

## 2017-08-02 DIAGNOSIS — M25.562 ARTHRALGIA OF BOTH LOWER LEGS: ICD-10-CM

## 2017-08-02 DIAGNOSIS — M25.561 ARTHRALGIA OF BOTH LOWER LEGS: ICD-10-CM

## 2017-08-02 DIAGNOSIS — M17.0 PRIMARY OSTEOARTHRITIS OF BOTH KNEES: Primary | ICD-10-CM

## 2017-08-02 PROCEDURE — 99213 OFFICE O/P EST LOW 20 MIN: CPT | Performed by: PEDIATRICS

## 2017-08-02 PROCEDURE — 73562 X-RAY EXAM OF KNEE 3: CPT | Mod: LT

## 2017-08-02 NOTE — PATIENT INSTRUCTIONS
Plan:  - Today's Plan of Care:  bilateral Synvisc One injections recommended with ultrasound guidance, to be completed by Dr. Tom Duke    Follow Up: as needed

## 2017-08-02 NOTE — MR AVS SNAPSHOT
After Visit Summary   8/2/2017    Jeff Plasencia    MRN: [id number]           Patient Information     Date Of Birth          5/20/1923        Visit Information        Provider Department      8/2/2017 2:40 PM Tala Smith MD [facility]        Today's Diagnoses     Arthralgia of both lower legs    -  1    Primary osteoarthritis of both knees          Care Instructions      Plan:  - Today's Plan of Care:  bilateral Synvisc One injections recommended with ultrasound guidance, to be completed by Dr. Tom Duke    Follow Up: as needed              Follow-ups after your visit        Additional Services     ORTHO  REFERRAL       [facility] is referring you to the Orthopedic  Services at [facility].       The  Representative will assist you in the coordination of your Orthopedic and Musculoskeletal Care as prescribed by your physician.    The  Representative will call you within 24 hours to help schedule your appointment, or you may contact the  Representative at:    [facility] ~ [phone]  [facility] ~ [phone]  [facility] ~ [phone]    Type of Referral : bilateral Synvisc One injections to be completed by Dr. Tom Duke with ultrasound guidance.       Timeframe requested: Routine     Coverage of these services is subject to the terms and limitations of your health insurance plan.  Please call member services at your health plan with any benefit or coverage questions.      If X-rays, CT or MRI's have been performed, please contact the facility where they were done to arrange for , prior to your scheduled appointment.  Please bring this referral request to your appointment and present it to your specialist.                  Your next 10 appointments already scheduled     Aug 24, 2017 10:00 AM CDT   PROCEDURE with Tom  "Konradlexyvanna Duke, DO   [facility]    [address]  [address] [place] [id number] [phone]              Who to contact     If you have questions or need follow up information about today's clinic visit or your schedule please contact [facility] directly at [phone].  Normal or non-critical lab and imaging results will be communicated to you by LoopIthart, letter or phone within 4 business days after the clinic has received the results. If you do not hear from us within 7 days, please contact the clinic through [employer] or phone. If you have a critical or abnormal lab result, we will notify you by phone as soon as possible.  Submit refill requests through Welzoo or call your pharmacy and they will forward the refill request to us. Please allow 3 business days for your refill to be completed.          Additional Information About Your Visit        LoopItharPulsant Information     Welzoo lets you send messages to your doctor, view your test results, renew your prescriptions, schedule appointments and more. To sign up, go to [email or web address] . Click on \"Log in\" on the left side of the screen, which will take you to the Welcome page. Then click on \"Sign up Now\" on the right side of the page.     You will be asked to enter the access code listed below, as well as some personal information. Please follow the directions to create your username and password.     Your access code is: [email or web address]  Expires: 2017  2:03 PM     Your access code will  in 90 days. If you need help or a new code, please call your [place] clinic or [phone].        Care EveryWhere ID     This is your Care EveryWhere ID. This could be used by other organizations to access your [place] medical records  [id number]        Your Vitals Were     Height BMI (Body Mass Index)                5' 11\" (1.803 m) 29.43 kg/m2           Blood " Pressure from Last 3 Encounters:   08/02/17 160/85   06/16/17 118/68   05/25/17 124/67    Weight from Last 3 Encounters:   08/02/17 211 lb (95.7 kg)   06/16/17 211 lb (95.7 kg)   04/25/17 210 lb (95.3 kg)              We Performed the Following     ORTHO  REFERRAL        Primary Care Provider Office Phone # Fax #    Lorenza Smith, [facility] [phone]       [address] [address]        Equal Access to Services     [facility] : Hadii magnus de leon hadasho Soomaali, waaxda luqadaha, qaybta kaalmada natasha, arpita portillo . So [facility] [phone].    ATENCIÓN: Si habla español, tiene a gabriel disposición servicios gratuitos de asistencia lingüística. [facility] [phone].    We comply with applicable federal civil rights laws and [place] laws. We do not discriminate on the basis of race, color, national origin, age, disability sex, sexual orientation or gender identity.            Thank you!     Thank you for choosing [facility]  for your care. Our goal is always to provide you with excellent care. Hearing back from our patients is one way we can continue to improve our services. Please take a few minutes to complete the written survey that you may receive in the mail after your visit with us. Thank you!             Your Updated Medication List - Protect others around you: Learn how to safely use, store and throw away your medicines at www.disposemymeds.org.          This list is accurate as of: 8/2/17  3:28 PM.  Always use your most recent med list.                   Brand Name Dispense Instructions for use Diagnosis    aspirin 81 MG tablet     100    1/2 tab daily        glucosamine chondroitin 500 complex Caps      DAILY        LUTEIN PO      Take by mouth daily        mometasone 50 MCG/ACT spray    NASONEX    1 Box    Spray 2 sprays into both nostrils daily    Acute rhinitis       VITAMIN  D PO      Take by mouth daily

## 2017-08-02 NOTE — PROGRESS NOTES
Sports Medicine Clinic Visit    PCP: Lorenza Smith Luis Angel is a 89 year old male who is seen  as a self referral presenting with chronic bilateral knee pain    Injury: Patient reports chronic knee pain, no injury noted.  Both knees are bothering him today, thinks they've bene aggravated from his PT for balance.  Has previously seen Anita Tompkins.  Per notes, last injection was cortisone, however, patient would like Synvisc because he doesn't think cortisone works.  Has had Synvisc in the past.    Location of Pain: medial and lateral joint line of both knees  Duration of Pain: 3 year(s)  Rating of Pain at worst: 3/10  Rating of Pain Currently: 2/10  Symptoms are better with: rest and injections  Symptoms are worse with: standing and walking  Additional Features:   Positive: grinding, catching and instability   Negative: swelling, bruising, paresthesias and numbness  Other evaluation and/or treatments so far consists of:    - right knee cortisone injections: 12/8/2015, 11/22/2016  - left knee cortisone injections: 6/5/2014, 12/8/2015  - left knee Synvisc One injection: 10/20/14  Prior History of related problems: none    Social History: retired    Review of Systems  Skin: no bruising, no swelling  Musculoskeletal: as above  Neurologic: no numbness, paresthesias  Remainder of review of systems is negative including constitutional, CV, pulmonary, GI, except as noted in HPI or medical history.    Patient's current problem list, past medical and surgical history, and family history were reviewed.    Patient Active Problem List   Diagnosis     CANCER   PROSTATE     Dermatophytosis of body     Hx of supraventricular tachycardia     Hyperlipidemia LDL goal <130     Advanced directives, counseling/discussion     Memory loss     Radiculopathy of leg     At high risk for falls     Orthostatic hypotension     Tremor     Past Medical History:   Diagnosis Date     BENIGN ESSENTIAL TREMOR     Did not tolerate  "propranolol     Calculus of kidney      Herpes zoster without mention of complication     Right T7-10 2003     Impotence of organic origin      Parotid mass 3/26/2009    CT 3/09- 2.6x 2.8 x2.9 cm Soft tissue mass within the left parotid gland. Biopsies negative.      Undiagnosed cardiac murmurs 2/9/2007    Echo 10/08 normal.      Past Surgical History:   Procedure Laterality Date     COLONOSCOPY  9/28/2011    Procedure:COLONOSCOPY; Colonoscopy  ; Surgeon:LAMAR ALLEN; Location:[facility]     SURGICAL HISTORY OF -   11/1999    Right cataract surgery     SURGICAL HISTORY OF -   12/1999    Hernia repair     SURGICAL HISTORY OF -   2001    Normal Colonoscopy     SURGICAL HISTORY OF -   8/2002    Cryosurgery of the prostate     SURGICAL HISTORY OF -   4/2003    Left cataract surgery     Family History   Problem Relation Age of Onset     Hypertension Brother      DIABETES Mother      age 70s     CEREBROVASCULAR DISEASE Sister      Cancer - colorectal No family hx of          Objective  /85  Ht 5' 11\" (1.803 m)  Wt 211 lb (95.7 kg)  BMI 29.43 kg/m2    GENERAL APPEARANCE: healthy, alert and no distress   GAIT: NORMAL  SKIN: no suspicious lesions or rashes  HEENT: Sclera clear, anicteric  CV: good peripheral pulses  RESP: Breathing not labored  NEURO: Normal strength and tone, mentation intact and speech normal  PSYCH:  mentation appears normal and affect normal/bright    Bilateral Knee exam    Inspection:      no effusion, swelling of bruising bilateral    Patella:      Mobility -       hypomobile bilateral       Crepitus noted in the patellofemoral joint bilateral        neg (-) compression test bilateral    Tender:      medial joint line bilateral       lateral joint line bilateral    Non Tender:      remainder of knee area bilateral    Knee ROM:      Full active and passive ROM with flexion and extension bilateral    Strength:      5/5 with knee extension bilateral    Special Tests:     neg (-) Niecy " bilateral       neg (-) anterior drawer bilateral       neg (-) posterior drawer bilateral       neg (-) varus at 0 and 30 bilateral       neg (-) valgus at 0 and 30 bilateral    Neurovascular:      2+ peripheral pulses bilaterally and brisk capillary refill       sensation grossly intact    Radiology  I ordered, visualized and reviewed these images with the patient  XR KNEE BILATERAL 3 VW   8/2/2017 3:04 PM      HISTORY: Pain in right knee, Pain in left knee     COMPARISON: 6/5/2014         IMPRESSION:  Right knee: Moderate to severe joint space narrowing at the medial  compartment of the right knee.     Left knee: Moderate to severe joint space narrowing at the lateral  compartment of the left knee. Moderate to severe joint space narrowing  at the lateral aspects of the left patellofemoral compartment. Lateral  patellar tilt.    Assessment:  1. Primary osteoarthritis of both knees      Discussed nature of degenerative arthrosis of the knee. Discussed symptom treatment with over-the-counter medications, ice or heat, topical treatments, and rest if needed. Discussed use of sleeve or wrap for comfort. Discussed benefits of exercise and weight loss to reduce pressure at the knee. Discussed injection therapy. Also briefly discussed future consideration of referral to orthopedic surgery for further evaluation and discussion of arthroplasty.  - given lack of response to other treatments, patient would like to proceed with Synvisc injections bilaterally    Plan:  - Today's Plan of Care:  bilateral Synvisc One injections recommended with ultrasound guidance, to be completed by Dr. Tom Duke    Follow Up: as needed    Concerning signs and symptoms were reviewed.  The patient expressed understanding of this management plan and all questions were answered at this time.    Tala Smith MD [facility]  Primary Care Sports Medicine  [facility]

## 2017-08-17 NOTE — PROGRESS NOTES
Final visit with Physical Therapy not completed. Outcomes stated based on last completed visit. Pt did not follow up with further treatment with [facility] and therefore discharging this plan of care.    Thank you for the referral,  Ana Sadler, PT, DPT

## 2017-08-24 ENCOUNTER — OFFICE VISIT (OUTPATIENT)
Dept: ORTHOPEDICS | Facility: CLINIC | Age: 82
End: 2017-08-24
Payer: MEDICARE

## 2017-08-24 VITALS
SYSTOLIC BLOOD PRESSURE: 150 MMHG | DIASTOLIC BLOOD PRESSURE: 80 MMHG | BODY MASS INDEX: 29.54 KG/M2 | WEIGHT: 211 LBS | HEIGHT: 71 IN

## 2017-08-24 DIAGNOSIS — M17.0 PRIMARY OSTEOARTHRITIS OF BOTH KNEES: Primary | ICD-10-CM

## 2017-08-24 PROCEDURE — 20611 DRAIN/INJ JOINT/BURSA W/US: CPT | Mod: 50 | Performed by: FAMILY MEDICINE

## 2017-08-24 NOTE — NURSING NOTE
"Chief Complaint   Patient presents with     Knee Pain     bilateral knee pain - US injection       Initial /80  Ht 5' 11\" (1.803 m)  Wt 211 lb (95.7 kg)  BMI 29.43 kg/m2 Estimated body mass index is 29.43 kg/(m^2) as calculated from the following:    Height as of this encounter: 5' 11\" (1.803 m).    Weight as of this encounter: 211 lb (95.7 kg).  Medication Reconciliation: complete     Rohan Dominguez ATC  "

## 2017-08-24 NOTE — MR AVS SNAPSHOT
"              After Visit Summary   2017    Jeff Plasencia    MRN: [id number]           Patient Information     Date Of Birth          1923        Visit Information        Provider Department      2017 10:00 AM Tom Duke DO [facility]        Today's Diagnoses     Primary osteoarthritis of both knees    -  1       Follow-ups after your visit        Who to contact     If you have questions or need follow up information about today's clinic visit or your schedule please contact [facility] directly at [phone].  Normal or non-critical lab and imaging results will be communicated to you by CogMetalhart, letter or phone within 4 business days after the clinic has received the results. If you do not hear from us within 7 days, please contact the clinic through CogMetalhart or phone. If you have a critical or abnormal lab result, we will notify you by phone as soon as possible.  Submit refill requests through Mazoom or call your pharmacy and they will forward the refill request to us. Please allow 3 business days for your refill to be completed.          Additional Information About Your Visit        MyChart Information     Mazoom lets you send messages to your doctor, view your test results, renew your prescriptions, schedule appointments and more. To sign up, go to [email or web address] . Click on \"Log in\" on the left side of the screen, which will take you to the Welcome page. Then click on \"Sign up Now\" on the right side of the page.     You will be asked to enter the access code listed below, as well as some personal information. Please follow the directions to create your username and password.     Your access code is: [email or web address]  Expires: 2017  2:03 PM     Your access code will  in 90 days. If you need help or a new code, please call your [place] clinic or [phone].        Care EveryWhere ID     This is " "your Care EveryWhere ID. This could be used by other organizations to access your [place] medical records  [id number]        Your Vitals Were     Height BMI (Body Mass Index)                5' 11\" (1.803 m) 29.43 kg/m2           Blood Pressure from Last 3 Encounters:   08/24/17 150/80   08/02/17 160/85   06/16/17 118/68    Weight from Last 3 Encounters:   08/24/17 211 lb (95.7 kg)   08/02/17 211 lb (95.7 kg)   06/16/17 211 lb (95.7 kg)              We Performed the Following     HC ARTHROCENTESIS ASPIR&/INJ MAJOR JT/BURSA W/US     SYNVISC-ONE INJECTION          Today's Medication Changes          These changes are accurate as of: 8/24/17 10:30 AM.  If you have any questions, ask your nurse or doctor.               Start taking these medicines.        Dose/Directions    Hylan 48 MG/6ML Sosy injection   Used for:  Primary osteoarthritis of both knees   Started by:  Tom Duke,         Dose:  96 mg   96 mg by INTRA-ARTICULAR route once for 1 dose   Quantity:  12 mL   Refills:  0            Where to get your medicines      Some of these will need a paper prescription and others can be bought over the counter.  Ask your nurse if you have questions.     You don't need a prescription for these medications     Hylan 48 MG/6ML Sosy injection                Primary Care Provider Office Phone # Fax #    Lorenza Smith, [facility] [phone]       [address] [place], [place] [address]        Equal Access to Services     JOHAN BECK AH: Hadii magnus dumonto Sotylorali, waaxda luqadaha, qaybta kaalmada delorisyada, aripta brothers. So [facility] [place] [facility] [phone].    ATENCIÓN: Si habla brandee, tiene a gabriel disposición servicios gratuitos de asistencia lingüística. Llame al [phone].    We comply with applicable federal civil rights laws and [place] laws. We do not discriminate on the basis of race, color, national origin, age, disability sex, sexual orientation or gender " identity.            Thank you!     Thank you for choosing [facility]  for your care. Our goal is always to provide you with excellent care. Hearing back from our patients is one way we can continue to improve our services. Please take a few minutes to complete the written survey that you may receive in the mail after your visit with us. Thank you!             Your Updated Medication List - Protect others around you: Learn how to safely use, store and throw away your medicines at www.disposemymeds.org.          This list is accurate as of: 8/24/17 10:30 AM.  Always use your most recent med list.                   Brand Name Dispense Instructions for use Diagnosis    aspirin 81 MG tablet     100    1/2 tab daily        glucosamine chondroitin 500 complex Caps      DAILY        Hylan 48 MG/6ML Sosy injection     12 mL    96 mg by INTRA-ARTICULAR route once for 1 dose    Primary osteoarthritis of both knees       LUTEIN PO      Take by mouth daily        mometasone 50 MCG/ACT spray    NASONEX    1 Box    Spray 2 sprays into both nostrils daily    Acute rhinitis       VITAMIN D PO      Take by mouth daily

## 2017-08-24 NOTE — PROGRESS NOTES
Jeff Plasencia  :  1923  DOS: 2017  MRN: [id number]    Sports Medicine Clinic Procedure    Ultrasound Guided Bilateral Intra-Articular Knee SynviscOne Injection    Clinical History: Patient reports chronic knee pain, no injury noted.  Both knees are bothering him today, thinks they've bene aggravated from his PT for balance.  Has previously seen Anita Brooks.  Per notes, last injection was cortisone, however, patient would like Synvisc because he doesn't think cortisone works.  Has had Synvisc in the past.    Diagnosis:   1. Primary osteoarthritis of both knees      Referring Physician: Tala Smith M.D.  Technique: The risks of the procedure were explained to the patient.  A consent was signed for the intra-articular knee procedure.  The patient was evaluated with a Venda ultrasound machine using a 12 MHz linear probe.     The Bilateral knee was prepped and draped in a sterile manner.  Ultrasound identification of the patella, suprapatellar pouch, quadriceps tendon and femur in both long and short axis. The probe was placed in long axis to the Bilateral femur.  A 2 inch 21 gauge needle was placed under ultrasound guidance into the superior knee joint.  6 ml of clear colored fluid was aspirated from the left, 5ml from the right.   6ml of SynviscOne (48 units) into the bilateral knee. The needle was removed and there was good hemostasis without complications.  There was ultrasound documentation of needle placement and injection.      Impression:  Successful Bilateral intra-articular knee SynviscOne and injection.    Plan:  Follow up PRN with Dr Smith based on results over next 4-6 weeks  Expectations and limitations of synvisc were reviewed in detail  Often 4-6 weeks before full effect may be noticed  Usually covered up to every 6 months by insurance, but does not need to be repeated unless pain returns, at which point we would re-evaluate  Potential use of CSI in future for flares of pain  reviewed in detail  Encouraged modified progressive pain-free activity as tolerated  HEP and Supportive care reviewed  All questions were answered today  Contact us with additional questions or concerns  Signs and sx of concern reviewed    Tom Duke DO, DANIAL  Primary Care Sports Medicine  [facility]

## 2017-09-14 ENCOUNTER — TELEPHONE (OUTPATIENT)
Dept: FAMILY MEDICINE | Facility: CLINIC | Age: 82
End: 2017-09-14

## 2017-09-14 NOTE — TELEPHONE ENCOUNTER
Per 06-16-17 OV-      Patient Instructions   [facility] drivers evaluation [phone], usually $350 (generally private pay).    Pt states called [facility] for  eval info. States he is not going to do this.  Insistent that he is fully capable of driving.  Lorenza- please see message below.   Any further recommendations?  CORTEZ Ricardo RN

## 2017-09-14 NOTE — TELEPHONE ENCOUNTER
"Jeff is calling stating he has gone through his \"to do list\" that he was given back in March. He is calling as he went to the court house to get his 's license reinstated but was told that he cannot get it back due to a letter that Lorenza Smith wrote on March 24th. 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 He is a bit upset about this. States he needs a letter from Lorenza stating he can drive. He also states that he had to recently get injections in his knees because of the physical therapy that he did. States he has done everything on that \"to do\" list he was given.    Please advise.    Destinee Severino-Station     "

## 2017-09-14 NOTE — TELEPHONE ENCOUNTER
We have discussed this several times. I will not sign a letter to reinstate his 's license. He needs a complete driving evaluation done- I gave him the number to call for this.     DELFINO Ann

## 2017-10-02 ENCOUNTER — ALLIED HEALTH/NURSE VISIT (OUTPATIENT)
Dept: FAMILY MEDICINE | Facility: CLINIC | Age: 82
End: 2017-10-02
Payer: MEDICARE

## 2017-10-02 DIAGNOSIS — Z23 NEED FOR PROPHYLACTIC VACCINATION AND INOCULATION AGAINST INFLUENZA: Primary | ICD-10-CM

## 2017-10-02 PROCEDURE — 90662 IIV NO PRSV INCREASED AG IM: CPT

## 2017-10-02 PROCEDURE — 99207 ZZC NO CHARGE NURSE ONLY: CPT

## 2017-10-02 PROCEDURE — G0008 ADMIN INFLUENZA VIRUS VAC: HCPCS

## 2017-10-02 NOTE — PROGRESS NOTES
Injectable Influenza Immunization Documentation    1.  Is the person to be vaccinated sick today?   No    2. Does the person to be vaccinated have an allergy to a component   of the vaccine?   No    3. Has the person to be vaccinated ever had a serious reaction   to influenza vaccine in the past?   No    4. Has the person to be vaccinated ever had Guillain-Barré syndrome?   No    Form completed by Tiffany Soler M.A.

## 2017-10-02 NOTE — NURSING NOTE
Prior to injection verified patient identity using patient's name and date of birth.  Per orders of Nenita Guerra CNP, injection of Flu shot given by Tiffany Soler. Patient instructed to remain in clinic for 15 minutes afterwards, and to report any adverse reaction to me immediately.  Screening Questionnaire for Adult Immunization    Are you sick today?   No   Do you have allergies to medications, food, a vaccine component or latex?   No   Have you ever had a serious reaction after receiving a vaccination?   No   Do you have a long-term health problem with heart disease, lung disease, asthma, kidney disease, metabolic disease (e.g. diabetes), anemia, or other blood disorder?   No   Do you have cancer, leukemia, HIV/AIDS, or any other immune system problem?   No   In the past 3 months, have you taken medications that affect  your immune system, such as prednisone, other steroids, or anticancer drugs; drugs for the treatment of rheumatoid arthritis, Crohn s disease, or psoriasis; or have you had radiation treatments?   No   Have you had a seizure, or a brain or other nervous system problem?   No   During the past year, have you received a transfusion of blood or blood     products, or been given immune (gamma) globulin or antiviral drug?   No   For women: Are you pregnant or is there a chance you could become        pregnant during the next month?   No   Have you received any vaccinations in the past 4 weeks?   No     Immunization questionnaire answers were all negative.   Screening performed by Tiffany Soler on 10/2/2017 at 11:10 AM.  Tiffany Soler M.A.

## 2017-10-02 NOTE — MR AVS SNAPSHOT
"              After Visit Summary   10/2/2017    Jeff Plasencia    MRN: [id number]           Patient Information     Date Of Birth          1923        Visit Information        Provider Department      10/2/2017 10:45 AM JESUS ROY CMA/LPN [facility]        Today's Diagnoses     Need for prophylactic vaccination and inoculation against influenza    -  1       Follow-ups after your visit        Who to contact     If you have questions or need follow up information about today's clinic visit or your schedule please contact [facility] directly at [phone].  Normal or non-critical lab and imaging results will be communicated to you by MyFithart, letter or phone within 4 business days after the clinic has received the results. If you do not hear from us within 7 days, please contact the clinic through Vital Sensorst or phone. If you have a critical or abnormal lab result, we will notify you by phone as soon as possible.  Submit refill requests through Imagine Health or call your pharmacy and they will forward the refill request to us. Please allow 3 business days for your refill to be completed.          Additional Information About Your Visit        MyChart Information     Imagine Health lets you send messages to your doctor, view your test results, renew your prescriptions, schedule appointments and more. To sign up, go to [email or web address] . Click on \"Log in\" on the left side of the screen, which will take you to the Welcome page. Then click on \"Sign up Now\" on the right side of the page.     You will be asked to enter the access code listed below, as well as some personal information. Please follow the directions to create your username and password.     Your access code is: [email or web address]  Expires: 2017 11:10 AM     Your access code will  in 90 days. If you need help or a new code, please call your [facility] or [phone].        Care EveryWhere ID     This is your Care " EveryWhere ID. This could be used by other organizations to access your [place] medical records  [id number]         Blood Pressure from Last 3 Encounters:   08/24/17 150/80   08/02/17 160/85   06/16/17 118/68    Weight from Last 3 Encounters:   08/24/17 211 lb (95.7 kg)   08/02/17 211 lb (95.7 kg)   06/16/17 211 lb (95.7 kg)              We Performed the Following     ADMIN INFLUENZA (For MEDICARE Patients ONLY) []     FLU VACCINE, INCREASED ANTIGEN, PRESV FREE, AGE 65+ [95560]        Primary Care Provider Office Phone # Fax #    Lorenza Smith, [facility] [phone]       [address] [facility] [address]        Equal Access to Services     HIRA BECK : Lainey dumonto Sosruthi, waaxda luqadaha, qaybta kaalmada adeegyashavon, arpita portillo . So [facility] [phone].    ATENCIÓN: Si habla español, tiene a gabriel disposición servicios gratuitos de asistencia lingüística. Llame al [phone].    We comply with applicable federal civil rights laws and [place] laws. We do not discriminate on the basis of race, color, national origin, age, disability, sex, sexual orientation, or gender identity.            Thank you!     Thank you for choosing [facility]  for your care. Our goal is always to provide you with excellent care. Hearing back from our patients is one way we can continue to improve our services. Please take a few minutes to complete the written survey that you may receive in the mail after your visit with us. Thank you!             Your Updated Medication List - Protect others around you: Learn how to safely use, store and throw away your medicines at www.disposemymeds.org.          This list is accurate as of: 10/2/17 11:10 AM.  Always use your most recent med list.                   Brand Name Dispense Instructions for use Diagnosis    aspirin 81 MG tablet     100    1/2 tab daily        glucosamine chondroitin 500 complex Caps       DAILY        LUTEIN PO      Take by mouth daily        mometasone 50 MCG/ACT spray    NASONEX    1 Box    Spray 2 sprays into both nostrils daily    Acute rhinitis       VITAMIN D PO      Take by mouth daily

## 2017-10-19 ENCOUNTER — OFFICE VISIT (OUTPATIENT)
Dept: FAMILY MEDICINE | Facility: CLINIC | Age: 82
End: 2017-10-19
Payer: MEDICARE

## 2017-10-19 VITALS
TEMPERATURE: 97.8 F | DIASTOLIC BLOOD PRESSURE: 70 MMHG | BODY MASS INDEX: 28.7 KG/M2 | SYSTOLIC BLOOD PRESSURE: 132 MMHG | OXYGEN SATURATION: 95 % | HEIGHT: 71 IN | RESPIRATION RATE: 18 BRPM | HEART RATE: 78 BPM | WEIGHT: 205 LBS

## 2017-10-19 DIAGNOSIS — Z01.818 PREOP GENERAL PHYSICAL EXAM: Primary | ICD-10-CM

## 2017-10-19 DIAGNOSIS — H57.819 PTOSIS OF EYEBROW: ICD-10-CM

## 2017-10-19 PROCEDURE — 99214 OFFICE O/P EST MOD 30 MIN: CPT | Performed by: FAMILY MEDICINE

## 2017-10-19 NOTE — MR AVS SNAPSHOT
After Visit Summary   10/19/2017    Jeff Plasencia    MRN: [id number]           Patient Information     Date Of Birth          5/20/1923        Visit Information        Provider Department      10/19/2017 10:40 AM Jostin Carlson MD [facility]        Today's Diagnoses     Preop general physical exam    -  1    Ptosis of eyebrow          Care Instructions      Before Your Surgery      Call your surgeon if there is any change in your health. This includes signs of a cold or flu (such as a sore throat, runny nose, cough, rash or fever).    Do not smoke, drink alcohol or take over the counter medicine (unless your surgeon or primary care doctor tells you to) for the 24 hours before and after surgery.    If you take prescribed drugs: Follow your doctor s orders about which medicines to take and which to stop until after surgery.    Eating and drinking prior to surgery: follow the instructions from your surgeon    Take a shower or bath the night before surgery. Use the soap your surgeon gave you to gently clean your skin. If you do not have soap from your surgeon, use your regular soap. Do not shave or scrub the surgery site.  Wear clean pajamas and have clean sheets on your bed.           Follow-ups after your visit        Who to contact     If you have questions or need follow up information about today's clinic visit or your schedule please contact [facility] directly at [phone].  Normal or non-critical lab and imaging results will be communicated to you by MyChart, letter or phone within 4 business days after the clinic has received the results. If you do not hear from us within 7 days, please contact the clinic through MyChart or phone. If you have a critical or abnormal lab result, we will notify you by phone as soon as possible.  Submit refill requests through CoaLogix or call your pharmacy and they will forward the refill request to us. Please allow 3 business  "days for your refill to be completed.          Additional Information About Your Visit        MyChart Information     IntelligentM lets you send messages to your doctor, view your test results, renew your prescriptions, schedule appointments and more. To sign up, go to [email or web address] . Click on \"Log in\" on the left side of the screen, which will take you to the Welcome page. Then click on \"Sign up Now\" on the right side of the page.     You will be asked to enter the access code listed below, as well as some personal information. Please follow the directions to create your username and password.     Your access code is: [email or web address]  Expires: 2017 11:10 AM     Your access code will  in 90 days. If you need help or a new code, please call your [place] clinic or [phone].        Care EveryWhere ID     This is your Care EveryWhere ID. This could be used by other organizations to access your [place] medical records  [id number]        Your Vitals Were     Pulse Temperature Respirations Height Pulse Oximetry BMI (Body Mass Index)    78 97.8  F (36.6  C) (Tympanic) 18 5' 11\" (1.803 m) 95% 28.59 kg/m2       Blood Pressure from Last 3 Encounters:   10/19/17 132/70   17 150/80   17 160/85    Weight from Last 3 Encounters:   10/19/17 205 lb (93 kg)   17 211 lb (95.7 kg)   17 211 lb (95.7 kg)              Today, you had the following     No orders found for display       Primary Care Provider Office Phone # Fax #    Lorenza Smith [phone]       [address] [facility] [address]        Equal Access to Services     HIRA BECK : Lainey Gay, mercedez qureshi, arpita jimenez. [facility] [phone].    ATENCIÓN: Si habla español, tiene a gabriel disposición servicios gratuitos de asistencia lingüística. Llame al [phone].    We comply with applicable federal civil rights laws " and [place] laws. We do not discriminate on the basis of race, color, national origin, age, disability, sex, sexual orientation, or gender identity.            Thank you!     Thank you for choosing [facility]  for your care. Our goal is always to provide you with excellent care. Hearing back from our patients is one way we can continue to improve our services. Please take a few minutes to complete the written survey that you may receive in the mail after your visit with us. Thank you!             Your Updated Medication List - Protect others around you: Learn how to safely use, store and throw away your medicines at www.disposemymeds.org.          This list is accurate as of: 10/19/17 11:12 AM.  Always use your most recent med list.                   Brand Name Dispense Instructions for use Diagnosis    aspirin 81 MG tablet     100    1/2 tab daily        glucosamine chondroitin 500 complex Caps      DAILY        LUTEIN PO      Take by mouth daily        mometasone 50 MCG/ACT spray    NASONEX    1 Box    Spray 2 sprays into both nostrils daily    Acute rhinitis       VITAMIN D PO      Take by mouth daily

## 2017-10-19 NOTE — PROGRESS NOTES
[address] [address]  [phone]  Dept: [phone]    PRE-OP EVALUATION:  Today's date: 10/19/2017    Jeff Plasencia (: 1923) presents for pre-operative evaluation assessment as requested by Dr. Ortiz.  He requires evaluation and anesthesia risk assessment prior to undergoing surgery/procedure for treatment of EyeLid droop .  Proposed procedure: Bilateral Upper Lid Brow Ptosis    Date of Surgery/ Procedure: 2017  Time of Surgery/ Procedure: 11:15am  Hospital/Surgical Facility: [facility]  Fax number for surgical facility: [phone]  Primary Physician: Lorenza Smith  Type of Anesthesia Anticipated: Local with MAC    Patient has a Health Care Directive or Living Will:  YES     1. NO - Do you have a history of heart attack, stroke, stent, bypass or surgery on an artery in the head, neck, heart or legs?  2. NO - Do you ever have any pain or discomfort in your chest?  3. NO - Do you have a history of  Heart Failure?  4. NO - Are you troubled by shortness of breath when: walking on the level, up a slight hill or at night?  5. NO - Do you currently have a cold, bronchitis or other respiratory infection?  6. NO - Do you have a cough, shortness of breath or wheezing?  7. NO - Do you sometimes get pains in the calves of your legs when you walk?  8. NO - Do you or anyone in your family have previous history of blood clots?  9. NO - Do you or does anyone in your family have a serious bleeding problem such as prolonged bleeding following surgeries or cuts?  10. NO - Have you ever had problems with anemia or been told to take iron pills?  11. NO - Have you had any abnormal blood loss such as black, tarry or bloody stools, or abnormal vaginal bleeding?  12. NO - Have you ever had a blood transfusion?  13. NO - Have you or any of your relatives ever had problems with anesthesia?  14. NO - Do you have sleep apnea,  excessive snoring or daytime drowsiness?  15. NO - Do you have any prosthetic heart valves?  16. NO - Do you have prosthetic joints?  17. NO - Is there any chance that you may be pregnant?        HPI:                                                      Brief HPI related to upcoming procedure:   89 -year-old male presents for preop medical visit. Past medical history significant for hyperlipidemia, memory loss, orthostatic hypotension and PVCs. He is scheduled to have surgery for bilateral ptosis on 11/02/2017. He denies any fever, chills, cough, shortness of breath, chest pain, bowel/bladder or other relevant systemic symptoms.      MEDICAL HISTORY:                                                    Patient Active Problem List    Diagnosis Date Noted     CANCER   PROSTATE      Priority: High     Dx 2001. Grade 2+2, PSA 7.9. Cryosurgery 2002. Rx lupron 2002.        Tremor 04/25/2017     Priority: Medium     Orthostatic hypotension 01/20/2016     Priority: Medium     At high risk for falls 02/24/2014     Priority: Medium     Advanced directives, counseling/discussion 05/06/2011     Priority: Medium     Health care directive received and was notarized on 9/19/11   This document was sent to scanning on 10/11/2011 11:43 AM  Justina MENSAH CMA         Hyperlipidemia LDL goal <130 10/31/2010     Priority: Medium     Radiculopathy of leg 06/20/2011     Priority: Low     left foot drop with left radiculopathy per neuro evaluation 6/11       Memory loss 05/06/2011     Priority: Low     Hx of supraventricular tachycardia      Priority: Low     history of non-sustained SVT, slow nonsustained VT by holter 2001. Echo 2000 normal.       Dermatophytosis of body 05/27/2005     Priority: Low     Cruris, umbilicus  Problem list name updated by automated process. Provider to review        Past Medical History:   Diagnosis Date     BENIGN ESSENTIAL TREMOR     Did not tolerate propranolol     Calculus of kidney      Herpes zoster without  mention of complication     Right T7-10 2003     Impotence of organic origin      Parotid mass 3/26/2009    CT 3/09- 2.6x 2.8 x2.9 cm Soft tissue mass within the left parotid gland. Biopsies negative.      Undiagnosed cardiac murmurs 2/9/2007    Echo 10/08 normal.      Past Surgical History:   Procedure Laterality Date     COLONOSCOPY  9/28/2011    Procedure:COLONOSCOPY; Colonoscopy  ; Surgeon:LAMAR ALLEN; Location:[facility]     SURGICAL HISTORY OF -   11/1999    Right cataract surgery     SURGICAL HISTORY OF -   12/1999    Hernia repair     SURGICAL HISTORY OF -   2001    Normal Colonoscopy     SURGICAL HISTORY OF -   8/2002    Cryosurgery of the prostate     SURGICAL HISTORY OF -   4/2003    Left cataract surgery     Current Outpatient Prescriptions   Medication Sig Dispense Refill     mometasone (NASONEX) 50 MCG/ACT nasal spray Spray 2 sprays into both nostrils daily (Patient not taking: Reported on 8/2/2017) 1 Box 2     VITAMIN D PO Take by mouth daily        LUTEIN PO Take by mouth daily        ASPIRIN 81 MG OR TABS 1/2 tab daily 100 3     GLUCOSAMINE CHONDR 500 COMPLEX OR CAPS DAILY  0     OTC products: None, except as noted above    Allergies   Allergen Reactions     Mustard Oil Anaphylaxis     Penicillins Anaphylaxis     Atenolol Rash     Cozaar [Losartan Potassium] Hives     Inderal [Propranolol Hcl]      Nightmares, headache, chestpain     Lisinopril Cough      Latex Allergy: NO    Social History   Substance Use Topics     Smoking status: Never Smoker     Smokeless tobacco: Never Used     Alcohol use Yes      Comment: socially     History   Drug Use No       REVIEW OF SYSTEMS:                                                    Constitutional, neuro, ENT, endocrine, pulmonary, cardiac, gastrointestinal, genitourinary, musculoskeletal, integument and psychiatric systems are negative, except as otherwise noted.      EXAM:                                                    /70 (Cuff Size: Adult  "Regular)  Pulse 78  Temp 97.8  F (36.6  C) (Tympanic)  Resp 18  Ht 5' 11\" (1.803 m)  Wt 205 lb (93 kg)  SpO2 95%  BMI 28.59 kg/m2      GENERAL APPEARANCE: alert, active and no distress     EYES: bilateral ptosis, right lower lid ectropion     HENT: ear canals and TM's normal and nose and mouth without ulcers or lesions     NECK: no adenopathy, no asymmetry, masses, or scars and thyroid normal to palpation     RESP: lungs clear to auscultation - no rales, rhonchi or wheezes     CV: regular rates and rhythm, normal S1 S2, no S3 or S4 and no murmur, click or rub     ABDOMEN:  soft, nontender, no HSM or masses and bowel sounds normal     MS: extremities normal- no gross deformities noted     SKIN: no suspicious lesions or rashes     NEURO: Normal strength and tone, sensory exam grossly normal, mentation intact and speech normal     PSYCH: mentation appears normal. and affect normal/bright     LYMPHATICS: No axillary, cervical, or supraclavicular nodes    DIAGNOSTICS:                                                    No labs or EKG required for low risk surgery (cataract, skin procedure, breast biopsy, etc)    Recent Labs   Lab Test  03/24/17   1008  11/03/16   0835  01/20/16   1115   05/06/11   1253   HGB   --   14.5  12.3*   < >   --    PLT   --   204  241   < >   --    NA  139   --   140   < >   --    POTASSIUM  4.1   --   4.2   < >   --    CR  1.03   --   1.17   < >   --    A1C   --    --    --    --   5.5    < > = values in this interval not displayed.        IMPRESSION:                                                    Reason for surgery/procedure: Bilateral ptosis/ surgical fixation  Diagnosis/reason for consult: Preop medical clearance     The proposed surgical procedure is considered LOW risk.    REVISED CARDIAC RISK INDEX  The patient has the following serious cardiovascular risks for perioperative complications such as (MI, PE, VFib and 3  AV Block):  No serious cardiac risks  INTERPRETATION: 0 risks: " Class I (very low risk - 0.4% complication rate)    The patient has the following additional risks for perioperative complications:  No identified additional risks        RECOMMENDATIONS:                                                        APPROVAL GIVEN to proceed with proposed procedure, without further diagnostic evaluation       Signed Electronically by: Jostin Carlson MD    Copy of this evaluation report is provided to requesting physician.    [place] Preop Guidelines

## 2017-10-19 NOTE — NURSING NOTE
"Chief Complaint   Patient presents with     Pre-Op Exam       Initial /70 (Cuff Size: Adult Regular)  Pulse 78  Temp 97.8  F (36.6  C) (Tympanic)  Resp 18  Ht 5' 11\" (1.803 m)  Wt 205 lb (93 kg)  SpO2 95%  BMI 28.59 kg/m2 Estimated body mass index is 28.59 kg/(m^2) as calculated from the following:    Height as of this encounter: 5' 11\" (1.803 m).    Weight as of this encounter: 205 lb (93 kg).  Medication Reconciliation: complete    Health Maintenance that is potentially due pending provider review:  NONE    n/a    Is there anyone who you would like to be able to receive your results? Not Applicable  If yes have patient fill out ALY    "

## 2018-01-01 ENCOUNTER — APPOINTMENT (OUTPATIENT)
Dept: CT IMAGING | Facility: CLINIC | Age: 83
End: 2018-01-01
Attending: NURSE PRACTITIONER
Payer: MEDICARE

## 2018-01-01 ENCOUNTER — ALLIED HEALTH/NURSE VISIT (OUTPATIENT)
Dept: FAMILY MEDICINE | Facility: CLINIC | Age: 83
End: 2018-01-01
Payer: MEDICARE

## 2018-01-01 ENCOUNTER — HOSPITAL ENCOUNTER (EMERGENCY)
Facility: CLINIC | Age: 83
Discharge: HOME | End: 2018-09-11
Attending: NURSE PRACTITIONER | Admitting: NURSE PRACTITIONER
Payer: MEDICARE

## 2018-01-01 ENCOUNTER — TRANSFERRED RECORDS (OUTPATIENT)
Dept: HEALTH INFORMATION MANAGEMENT | Facility: CLINIC | Age: 83
End: 2018-01-01

## 2018-01-01 ENCOUNTER — TELEPHONE (OUTPATIENT)
Dept: FAMILY MEDICINE | Facility: CLINIC | Age: 83
End: 2018-01-01

## 2018-01-01 VITALS
RESPIRATION RATE: 16 BRPM | BODY MASS INDEX: 23.3 KG/M2 | TEMPERATURE: 98 F | WEIGHT: 172 LBS | HEIGHT: 72 IN | DIASTOLIC BLOOD PRESSURE: 96 MMHG | SYSTOLIC BLOOD PRESSURE: 149 MMHG | OXYGEN SATURATION: 98 %

## 2018-01-01 DIAGNOSIS — S01.81XA LACERATION OF FOREHEAD, INITIAL ENCOUNTER: ICD-10-CM

## 2018-01-01 DIAGNOSIS — Z48.02 VISIT FOR SUTURE REMOVAL: Primary | ICD-10-CM

## 2018-01-01 DIAGNOSIS — S01.91XA LACERATION OF HEAD: Primary | ICD-10-CM

## 2018-01-01 DIAGNOSIS — S09.90XA CLOSED HEAD INJURY, INITIAL ENCOUNTER: ICD-10-CM

## 2018-01-01 DIAGNOSIS — S51.012A SKIN TEAR OF LEFT ELBOW WITHOUT COMPLICATION, INITIAL ENCOUNTER: ICD-10-CM

## 2018-01-01 PROCEDURE — 99284 EMERGENCY DEPT VISIT MOD MDM: CPT | Mod: 25

## 2018-01-01 PROCEDURE — 72125 CT NECK SPINE W/O DYE: CPT

## 2018-01-01 PROCEDURE — 99207 ZZC NO CHARGE NURSE ONLY: CPT

## 2018-01-01 PROCEDURE — 12013 RPR F/E/E/N/L/M 2.6-5.0 CM: CPT | Mod: Z6 | Performed by: NURSE PRACTITIONER

## 2018-01-01 PROCEDURE — 99284 EMERGENCY DEPT VISIT MOD MDM: CPT | Mod: 25 | Performed by: NURSE PRACTITIONER

## 2018-01-01 PROCEDURE — 12013 RPR F/E/E/N/L/M 2.6-5.0 CM: CPT

## 2018-01-01 PROCEDURE — 12013 RPR F/E/E/N/L/M 2.6-5.0 CM: CPT | Performed by: NURSE PRACTITIONER

## 2018-01-01 PROCEDURE — 70450 CT HEAD/BRAIN W/O DYE: CPT

## 2018-01-01 ASSESSMENT — ENCOUNTER SYMPTOMS
SPEECH DIFFICULTY: 0
COUGH: 0
FEVER: 0
SHORTNESS OF BREATH: 0
LIGHT-HEADEDNESS: 0
BACK PAIN: 0
DIZZINESS: 0
ABDOMINAL PAIN: 0
HEADACHES: 0
WOUND: 1
APPETITE CHANGE: 0
CHILLS: 0
CONFUSION: 0
ACTIVITY CHANGE: 0
NECK PAIN: 1
CHEST TIGHTNESS: 0

## 2018-01-19 ENCOUNTER — ALLIED HEALTH/NURSE VISIT (OUTPATIENT)
Dept: FAMILY MEDICINE | Facility: CLINIC | Age: 83
End: 2018-01-19
Payer: MEDICARE

## 2018-01-19 VITALS — SYSTOLIC BLOOD PRESSURE: 136 MMHG | DIASTOLIC BLOOD PRESSURE: 80 MMHG | HEART RATE: 84 BPM

## 2018-01-19 DIAGNOSIS — Z01.30 BLOOD PRESSURE CHECK: Primary | ICD-10-CM

## 2018-01-19 PROCEDURE — 99207 ZZC NO CHARGE NURSE ONLY: CPT

## 2018-01-19 NOTE — MR AVS SNAPSHOT
"              After Visit Summary   2018    Jeff Plasencia    MRN: [id number]           Patient Information     Date Of Birth          1923        Visit Information        Provider Department      2018 2:15 PM [place] KIRILL TONG [facility]        Today's Diagnoses     Blood pressure check    -  1       Follow-ups after your visit        Who to contact     If you have questions or need follow up information about today's clinic visit or your schedule please contact [facility] directly at [phone].  Normal or non-critical lab and imaging results will be communicated to you by MyChart, letter or phone within 4 business days after the clinic has received the results. If you do not hear from us within 7 days, please contact the clinic through [employer] or phone. If you have a critical or abnormal lab result, we will notify you by phone as soon as possible.  Submit refill requests through Ravgen or call your pharmacy and they will forward the refill request to us. Please allow 3 business days for your refill to be completed.          Additional Information About Your Visit        MyChart Information     Ravgen lets you send messages to your doctor, view your test results, renew your prescriptions, schedule appointments and more. To sign up, go to [email or web address] . Click on \"Log in\" on the left side of the screen, which will take you to the Welcome page. Then click on \"Sign up Now\" on the right side of the page.     You will be asked to enter the access code listed below, as well as some personal information. Please follow the directions to create your username and password.     Your access code is: [email or web address]  Expires: 2018  2:28 PM     Your access code will  in 90 days. If you need help or a new code, please call your [facility] or [phone].        Care EveryWhere ID     This is your Care EveryWhere ID. This could be used by other organizations to " access your [place] medical records  [id number]        Your Vitals Were     Pulse                   84            Blood Pressure from Last 3 Encounters:   01/19/18 136/80   10/19/17 132/70   08/24/17 150/80    Weight from Last 3 Encounters:   10/19/17 205 lb (93 kg)   08/24/17 211 lb (95.7 kg)   08/02/17 211 lb (95.7 kg)              Today, you had the following     No orders found for display       Primary Care Provider Office Phone # Fax #    Lorenza Smith, [facility] [phone]       [address] [facility] [address]        Equal Access to Services     JOHAN BECK : Hadii magnus Gay, waaxda lukhoa, qaybta kaalmada natasha, arpita brothers. So [facility] [phone].    ATENCIÓN: Si habla español, tiene a gabriel disposición servicios gratuitos de asistencia lingüística. Llame al [phone].    We comply with applicable federal civil rights laws and [place] laws. We do not discriminate on the basis of race, color, national origin, age, disability, sex, sexual orientation, or gender identity.            Thank you!     Thank you for choosing [facility]  for your care. Our goal is always to provide you with excellent care. Hearing back from our patients is one way we can continue to improve our services. Please take a few minutes to complete the written survey that you may receive in the mail after your visit with us. Thank you!             Your Updated Medication List - Protect others around you: Learn how to safely use, store and throw away your medicines at www.disposemymeds.org.          This list is accurate as of: 1/19/18  2:28 PM.  Always use your most recent med list.                   Brand Name Dispense Instructions for use Diagnosis    aspirin 81 MG tablet     100    1/2 tab daily        glucosamine chondroitin 500 complex Caps      DAILY        LUTEIN PO      Take by mouth daily        mometasone 50 MCG/ACT spray    NASONEX     1 Box    Spray 2 sprays into both nostrils daily    Acute rhinitis       VITAMIN D PO      Take by mouth daily

## 2018-01-19 NOTE — PROGRESS NOTES
S-(situation): RN visit for BP check as states home readings have been elevated past 2 days.     B-(background): No antihypertensive meds. Reports home reading at noon today 180/73. Home monitor older model, [facility] has brought in for comparable comparison.     A-(assessment):   BP Readings from Last 6 Encounters:   01/19/18 136/80   10/19/17 132/70   08/24/17 150/80   08/02/17 160/85   06/16/17 118/68   05/25/17 124/67     HR 84.  Denies lightheadedness, dizziness, headache/ pressure or any other abnormal sx.    R-(recommendations): Advised to continue home BP monitoring, F/U with provider if home BP's continue elevated. Bring in home monitor for comparison again.  CORTEZ Ricardo RN

## 2018-02-20 DIAGNOSIS — J31.0 OTHER CHRONIC RHINITIS: Primary | ICD-10-CM

## 2018-02-21 ENCOUNTER — TELEPHONE (OUTPATIENT)
Dept: FAMILY MEDICINE | Facility: CLINIC | Age: 83
End: 2018-02-21

## 2018-02-21 PROBLEM — J31.0 OTHER CHRONIC RHINITIS: Status: ACTIVE | Noted: 2018-02-21

## 2018-02-21 RX ORDER — IBUPROFEN 600 MG/1
TABLET ORAL
Qty: 17 G | Refills: 11 | Status: SHIPPED | OUTPATIENT
Start: 2018-02-21 | End: 2019-01-01

## 2018-02-21 NOTE — TELEPHONE ENCOUNTER
"Refilled for a year. This might not be covered by his insurance \"X\" noted in ordering. If not, he can purchase this over-the-counter. DELFINO Ann    "

## 2018-02-21 NOTE — TELEPHONE ENCOUNTER
Prior Authorization Retail Medication Request  Medication/Dose: NASONEX 50 MCG/AC SPRAY  Diagnosis and ICD code: Other chronic rhinitis [J31.0]   New/Renewal/Insurance Change PA: New  Previously Tried and Failed Therapies: ?    Insurance ID (if provided): [id number] Group ID: [id number]  Insurance Phone (if provided): [phone]    Any additional info from fax request:     If you received a fax notification from an outside Pharmacy:  Pharmacy Name:[facility]  Pharmacy #:[phone]  Pharmacy Fax:[phone]

## 2018-02-24 NOTE — TELEPHONE ENCOUNTER
PA Initiation    Medication: NASONEX 50 MCG/AC SPRAY  Insurance Company: [employer] - Phone [phone] Fax [phone]  Pharmacy Filling the Rx: [facility] [address] [place] [address]  Filling Pharmacy Phone: [phone]  Filling Pharmacy Fax: [phone]  Start Date: 2/24/2018    Central Prior Authorization Team   Phone: [phone]

## 2018-02-26 NOTE — TELEPHONE ENCOUNTER
Prior Authorization Approval    Authorization Effective Date: 1/1/2018  Authorization Expiration Date: 2/24/2019  Medication: NASONEX 50 MCG/AC SPRAY-APPROVED  Approved Dose/Quantity:    Reference #:     Insurance Company: [employer] - Phone [phone] Fax [phone]  Expected CoPay: $8.35     CoPay Card Available:      Foundation Assistance Needed:    Which Pharmacy is filling the prescription (Not needed for infusion/clinic administered): [facility] [address] [place] [address]  Pharmacy Notified: Yes  Patient Notified: Yes

## 2018-03-01 ENCOUNTER — OFFICE VISIT (OUTPATIENT)
Dept: FAMILY MEDICINE | Facility: CLINIC | Age: 83
End: 2018-03-01
Payer: MEDICARE

## 2018-03-01 VITALS
TEMPERATURE: 97.4 F | OXYGEN SATURATION: 97 % | RESPIRATION RATE: 18 BRPM | BODY MASS INDEX: 29.54 KG/M2 | DIASTOLIC BLOOD PRESSURE: 78 MMHG | HEART RATE: 83 BPM | SYSTOLIC BLOOD PRESSURE: 130 MMHG | HEIGHT: 71 IN | WEIGHT: 211 LBS

## 2018-03-01 DIAGNOSIS — R21 RASH AND NONSPECIFIC SKIN ERUPTION: Primary | ICD-10-CM

## 2018-03-01 LAB
KOH PREP SPEC: NORMAL
SPECIMEN SOURCE: NORMAL

## 2018-03-01 PROCEDURE — 99213 OFFICE O/P EST LOW 20 MIN: CPT | Performed by: FAMILY MEDICINE

## 2018-03-01 PROCEDURE — 87220 TISSUE EXAM FOR FUNGI: CPT | Performed by: FAMILY MEDICINE

## 2018-03-01 RX ORDER — TRIAMCINOLONE ACETONIDE 5 MG/G
CREAM TOPICAL
Qty: 30 G | Refills: 2 | Status: ON HOLD | OUTPATIENT
Start: 2018-03-01 | End: 2018-04-12

## 2018-03-01 NOTE — MR AVS SNAPSHOT
"              After Visit Summary   3/1/2018    Jeff Plasencia    MRN: [id number]           Patient Information     Date Of Birth          5/20/1923        Visit Information        Provider Department      3/1/2018 10:40 AM Jostin Carlson MD [facility]        Today's Diagnoses     Rash and nonspecific skin eruption    -  1       Follow-ups after your visit        Your next 10 appointments already scheduled     Apr 25, 2018 10:15 AM CDT   [facility] Bre[facility]ias MD   [facility]    [address] [address] [phone]              Who to contact     If you have questions or need follow up information about today's clinic visit or your schedule please contact [facility] directly at [phone].  Normal or non-critical lab and imaging results will be communicated to you by MyChart, letter or phone within 4 business days after the clinic has received the results. If you do not hear from us within 7 days, please contact the clinic through MyChart or phone. If you have a critical or abnormal lab result, we will notify you by phone as soon as possible.  Submit refill requests through Derc[employer] or call your pharmacy and they will forward the refill request to us. Please allow 3 business days for your refill to be completed.          Additional Information About Your Visit        MyChart Information     Derceto lets you send messages to your doctor, view your test results, renew your prescriptions, schedule appointments and more. To sign up, go to [email or web address] . Click on \"Log in\" on the left side of the screen, which will take you to the Welcome page. Then click on \"Sign up Now\" on the right side of the page.     You will be asked to enter the access code listed below, as well as some personal information. Please follow the directions to create your username and password.     Your access code is: " "[email or web address]  Expires: 2018  2:28 PM     Your access code will  in 90 days. If you need help or a new code, please call your [facility] clinic or [phone].        Care EveryWhere ID     This is your Care EveryWhere ID. This could be used by other organizations to access your [facility] medical records  [id number]        Your Vitals Were     Pulse Temperature Respirations Height Pulse Oximetry BMI (Body Mass Index)    83 97.4  F (36.3  C) (Tympanic) 18 5' 11\" (1.803 m) 97% 29.43 kg/m2       Blood Pressure from Last 3 Encounters:   18 130/78   18 136/80   10/19/17 132/70    Weight from Last 3 Encounters:   18 211 lb (95.7 kg)   10/19/17 205 lb (93 kg)   17 211 lb (95.7 kg)              We Performed the Following     KOH prep (skin, hair or nails only)          Today's Medication Changes          These changes are accurate as of 3/1/18 11:19 AM.  If you have any questions, ask your nurse or doctor.               Start taking these medicines.        Dose/Directions    triamcinolone 0.5 % cream   Commonly known as:  KENALOG   Used for:  Rash and nonspecific skin eruption   Started by:  Jostin Carlson MD        Apply sparingly to affected area twice daily PRN.   Quantity:  30 g   Refills:  2            Where to get your medicines      These medications were sent to [facility] [address] [place] [address] [facility]  [address] [id number]     Phone:  [phone]     triamcinolone 0.5 % cream                Primary Care Provider Office Phone # Fax #    Lorenza Milenamignon Smith [phone]       [address] [facility] [address]        Equal Access to Services     HIRA BECK AH: Lainey garay Sosruthi, waaxda luqadaha, qaybta kaalmada adeegyashavon, arpita brothers. So [facility] [phone].    ATENCIÓN: Si habla español, tiene a gabriel disposición servicios gratuitos de asistencia lingüística. Llame al " [phone].    We comply with applicable federal civil rights laws and [place] laws. We do not discriminate on the basis of race, color, national origin, age, disability, sex, sexual orientation, or gender identity.            Thank you!     Thank you for choosing [facility]  for your care. Our goal is always to provide you with excellent care. Hearing back from our patients is one way we can continue to improve our services. Please take a few minutes to complete the written survey that you may receive in the mail after your visit with us. Thank you!             Your Updated Medication List - Protect others around you: Learn how to safely use, store and throw away your medicines at www.disposemymeds.org.          This list is accurate as of 3/1/18 11:19 AM.  Always use your most recent med list.                   Brand Name Dispense Instructions for use Diagnosis    aspirin 81 MG tablet     100    1/2 tab daily        glucosamine chondroitin 500 complex Caps      DAILY        LUTEIN PO      Take by mouth daily        NASONEX 50 MCG/ACT spray   Generic drug:  mometasone     17 g    USE TWO SPRAY(S) IN EACH NOSTRIL ONCE DAILY    Other chronic rhinitis       triamcinolone 0.5 % cream    KENALOG    30 g    Apply sparingly to affected area twice daily PRN.    Rash and nonspecific skin eruption       VITAMIN D PO      Take by mouth daily

## 2018-03-01 NOTE — PROGRESS NOTES
SUBJECTIVE:   Jeff Plasencia is a 89 year old male who presents to clinic today for the following health issues:      Rash      Duration: 10 days     Description  Location: Left Wrist, both legs   Itching: mild    Intensity:  moderate    Accompanying signs and symptoms: Had a watch on his wrist- but has taken that off now     History (similar episodes/previous evaluation): None    Precipitating or alleviating factors:  New exposures:  None  Recent travel: no      Therapies tried and outcome: a&d ointment, calamine lotion- helps with the itch       Problem list and histories reviewed & adjusted, as indicated.  Additional history: as documented    Patient Active Problem List   Diagnosis     CANCER   PROSTATE     Dermatophytosis of body     Hx of supraventricular tachycardia     Hyperlipidemia LDL goal <130     Advanced directives, counseling/discussion     Memory loss     Radiculopathy of leg     At high risk for falls     Orthostatic hypotension     Tremor     Other chronic rhinitis     Past Surgical History:   Procedure Laterality Date     COLONOSCOPY  9/28/2011    Procedure:COLONOSCOPY; Colonoscopy  ; Surgeon:LAMAR ALLEN; Location:[facility]     SURGICAL HISTORY OF -   11/1999    Right cataract surgery     SURGICAL HISTORY OF -   12/1999    Hernia repair     SURGICAL HISTORY OF -   2001    Normal Colonoscopy     SURGICAL HISTORY OF -   8/2002    Cryosurgery of the prostate     SURGICAL HISTORY OF -   4/2003    Left cataract surgery       Social History   Substance Use Topics     Smoking status: Never Smoker     Smokeless tobacco: Never Used     Alcohol use Yes      Comment: socially     Family History   Problem Relation Age of Onset     Hypertension Brother      DIABETES Mother      age 70s     CEREBROVASCULAR DISEASE Sister      Cancer - colorectal No family hx of          Current Outpatient Prescriptions   Medication Sig Dispense Refill     NASONEX 50 MCG/ACT spray USE TWO SPRAY(S) IN EACH NOSTRIL ONCE  "DAILY 17 g 11     VITAMIN D PO Take by mouth daily        LUTEIN PO Take by mouth daily        ASPIRIN 81 MG OR TABS 1/2 tab daily 100 3     GLUCOSAMINE CHONDR 500 COMPLEX OR CAPS DAILY  0     Allergies   Allergen Reactions     Mustard Oil Anaphylaxis     Penicillins Anaphylaxis     Atenolol Rash     Cozaar [Losartan Potassium] Hives     Inderal [Propranolol Hcl]      Nightmares, headache, chestpain     Lisinopril Cough     Recent Labs   Lab Test  05/01/17   1102  04/26/17   0850  03/24/17   1008  11/03/16   0835  01/20/16   1115 08/21/15  08/11/14   05/06/11   1253   A1C   --    --    --    --    --    --    --    --    --   5.5   LDL   --    --    --   107*   --   121   --   118   < >   --    HDL   --    --    --   59   --   43   --   43   < >   --    TRIG   --    --    --   46   --   96   --   86   < >   --    ALT   --    --   17   --   28  27   < >  32   < >   --    CR   --    --   1.03   --   1.17  0.9   < >  0.9   < >   --    GFRESTIMATED  70   --   67   --   58*  >60   < >   --    < >   --    GFRESTBLACK  84   --   81   --   70   --    < >   --    < >   --    POTASSIUM   --    --   4.1   --   4.2  4.6   < >  4.4   < >   --    TSH   --   3.21   --    --   2.03   --    --    --    < >  2.15    < > = values in this interval not displayed.      BP Readings from Last 3 Encounters:   03/01/18 130/78   01/19/18 136/80   10/19/17 132/70    Wt Readings from Last 3 Encounters:   03/01/18 211 lb (95.7 kg)   10/19/17 205 lb (93 kg)   08/24/17 211 lb (95.7 kg)                  Labs reviewed in EPIC    Reviewed and updated as needed this visit by clinical staff       Reviewed and updated as needed this visit by Provider         ROS:  Constitutional, HEENT, cardiovascular, pulmonary, gi and gu systems are negative, except as otherwise noted.    OBJECTIVE:     /78 (Cuff Size: Adult Regular)  Pulse 83  Temp 97.4  F (36.3  C) (Tympanic)  Resp 18  Ht 5' 11\" (1.803 m)  Wt 211 lb (95.7 kg)  SpO2 97%  BMI 29.43 " kg/m2  Body mass index is 29.43 kg/(m^2).  GENERAL: healthy, alert and no distress  NECK: no adenopathy, no asymmetry, masses, or scars and thyroid normal to palpation  RESP: lungs clear to auscultation - no rales, rhonchi or wheezes  SKIN: Erythematous maculopapular rashes involving bilateral upper thighs and left wrist with scales as shown below, no blistering, tenderness, warmth or discharge noted, would lamp negative for erythrasma  NEURO: Normal strength and tone, mentation intact and speech normal      Left wrist        Right upper thigh       Left inner thigh           Results for orders placed or performed in visit on 03/01/18   KOH prep (skin, hair or nails only)   Result Value Ref Range    Specimen Description Rash Skin     KOH Skin Hair Nails Test No fungal elements seen        ASSESSMENT/PLAN:         ICD-10-CM    1. Rash and nonspecific skin eruption R21 KOH prep (skin, hair or nails only)     triamcinolone (KENALOG) 0.5 % cream       Suspect symptoms secondary to dermatitis.  Would lamp exam negative for erythrasma.  KOH prep showed no fungal element.  Kenalog prescribed, common side effect discussed.  Suggested to use regular moisturizers (cetaphil or vanicream).  Maintain well hydration.  Follow-up if lesion persists or worsen.  Will consider dermatology referral.  Patient understood and in agreement with above plan.  All questions answered.        Jostin Carlson MD  [facility]

## 2018-03-01 NOTE — NURSING NOTE
"Chief Complaint   Patient presents with     Derm Problem       Initial /78 (Cuff Size: Adult Regular)  Pulse 83  Temp 97.4  F (36.3  C) (Tympanic)  Resp 18  Ht 5' 11\" (1.803 m)  Wt 211 lb (95.7 kg)  SpO2 97%  BMI 29.43 kg/m2 Estimated body mass index is 29.43 kg/(m^2) as calculated from the following:    Height as of this encounter: 5' 11\" (1.803 m).    Weight as of this encounter: 211 lb (95.7 kg).      Health Maintenance that is potentially due pending provider review:  Labs      Possibly completing today per provider review.    Is there anyone who you would like to be able to receive your results? Not Applicable  If yes have patient fill out ALY    "

## 2018-04-06 ENCOUNTER — OFFICE VISIT (OUTPATIENT)
Dept: FAMILY MEDICINE | Facility: CLINIC | Age: 83
End: 2018-04-06
Payer: MEDICARE

## 2018-04-06 VITALS
WEIGHT: 200 LBS | HEIGHT: 71 IN | TEMPERATURE: 98.2 F | OXYGEN SATURATION: 98 % | HEART RATE: 80 BPM | RESPIRATION RATE: 20 BRPM | DIASTOLIC BLOOD PRESSURE: 76 MMHG | SYSTOLIC BLOOD PRESSURE: 136 MMHG | BODY MASS INDEX: 28 KG/M2

## 2018-04-06 DIAGNOSIS — R21 RASH AND NONSPECIFIC SKIN ERUPTION: ICD-10-CM

## 2018-04-06 DIAGNOSIS — H02.102 ECTROPION OF RIGHT LOWER EYELID, UNSPECIFIED ECTROPION TYPE: ICD-10-CM

## 2018-04-06 DIAGNOSIS — Z01.818 PREOP GENERAL PHYSICAL EXAM: Primary | ICD-10-CM

## 2018-04-06 PROCEDURE — 99214 OFFICE O/P EST MOD 30 MIN: CPT | Performed by: NURSE PRACTITIONER

## 2018-04-06 NOTE — MR AVS SNAPSHOT
After Visit Summary   4/6/2018    Jeff Plasencia    MRN: [id number]           Patient Information     Date Of Birth          5/20/1923        Visit Information        Provider Department      4/6/2018 10:00 AM Lorenza Smith CNP [facility]        Today's Diagnoses     Preop general physical exam    -  1    Ectropion of right lower eyelid, unspecified ectropion type        Rash and nonspecific skin eruption          Care Instructions    1. Preop general physical exam  Approved    2. Ectropion of right lower eyelid, unspecified ectropion type  Chronic, stable    3. Rash and nonspecific skin eruption  Acute, improved  -Keep an eye on your rash. If it doesn't completely resolve, call, and we can refer you to the Dermatologist in [place].    Before Your Surgery      Call your surgeon if there is any change in your health. This includes signs of a cold or flu (such as a sore throat, runny nose, cough, rash or fever).    Do not smoke, drink alcohol or take over the counter medicine (unless your surgeon or primary care doctor tells you to) for the 24 hours before and after surgery.    If you take prescribed drugs: Follow your doctor s orders about which medicines to take and which to stop until after surgery.    Eating and drinking prior to surgery: follow the instructions from your surgeon    Take a shower or bath the night before surgery. Use the soap your surgeon gave you to gently clean your skin. If you do not have soap from your surgeon, use your regular soap. Do not shave or scrub the surgery site.  Wear clean pajamas and have clean sheets on your bed.           Follow-ups after your visit        Your next 10 appointments already scheduled     Apr 12, 2018   Procedure with Garry Ortiz MD   [facility] (--)    [address]  [facility] [address]   [phone]            Apr 25, 2018 10:15 AM CDT   Return Visit with Breanne Garcias MD  "  [facility]    [address]  [facility] [address]   [phone]              Who to contact     If you have questions or need follow up information about today's clinic visit or your schedule please contact [facility] directly at [phone].  Normal or non-critical lab and imaging results will be communicated to you by MyChart, letter or phone within 4 business days after the clinic has received the results. If you do not hear from us within 7 days, please contact the clinic through MyChart or phone. If you have a critical or abnormal lab result, we will notify you by phone as soon as possible.  Submit refill requests through [employer] or call your pharmacy and they will forward the refill request to us. Please allow 3 business days for your refill to be completed.          Additional Information About Your Visit        [employer] Information     [employer] lets you send messages to your doctor, view your test results, renew your prescriptions, schedule appointments and more. To sign up, go to [email or web address] . Click on \"Log in\" on the left side of the screen, which will take you to the Welcome page. Then click on \"Sign up Now\" on the right side of the page.     You will be asked to enter the access code listed below, as well as some personal information. Please follow the directions to create your username and password.     Your access code is: [email or web address]  Expires: 2018  3:28 PM     Your access code will  in 90 days. If you need help or a new code, please call your [facility] or [phone].        Care EveryWhere ID     This is your Care EveryWhere ID. This could be used by other organizations to access your [facility] medical records  [id number]        Your Vitals Were     Pulse Temperature Respirations Height Pulse Oximetry BMI (Body Mass Index)    80 98.2  F (36.8  C) (Tympanic) 20 5' 11\" (1.803 m) 98% 27.89 kg/m2       " Blood Pressure from Last 3 Encounters:   04/06/18 136/76   03/01/18 130/78   01/19/18 136/80    Weight from Last 3 Encounters:   04/06/18 200 lb (90.7 kg)   03/01/18 211 lb (95.7 kg)   10/19/17 205 lb (93 kg)              Today, you had the following     No orders found for display       Primary Care Provider Office Phone # Fax #    Lorenza Smith, [facility] [phone]       [address] [facility] [address]        Equal Access to Services     [facility] Center[facility] : Hadii magnus Gay, wabraydenda titus, qaybta kaalmashavon kaur, arpita portillo . So [facility] [phone].    ATENCIÓN: Si habla español, tiene a gabriel disposición servicios gratuitos de asistencia lingüística. Llame al [phone].    We comply with applicable federal civil rights laws and [place] laws. We do not discriminate on the basis of race, color, national origin, age, disability, sex, sexual orientation, or gender identity.            Thank you!     Thank you for choosing [facility]  for your care. Our goal is always to provide you with excellent care. Hearing back from our patients is one way we can continue to improve our services. Please take a few minutes to complete the written survey that you may receive in the mail after your visit with us. Thank you!             Your Updated Medication List - Protect others around you: Learn how to safely use, store and throw away your medicines at www.disposemymeds.org.          This list is accurate as of 4/6/18 10:45 AM.  Always use your most recent med list.                   Brand Name Dispense Instructions for use Diagnosis    aspirin 81 MG tablet     100    1/2 tab daily        glucosamine chondroitin 500 complex Caps      DAILY        LUTEIN PO      Take by mouth daily        NASONEX 50 MCG/ACT spray   Generic drug:  mometasone     17 g    USE TWO SPRAY(S) IN EACH NOSTRIL ONCE DAILY    Other chronic rhinitis       triamcinolone 0.5  % cream    KENALOG    30 g    Apply sparingly to affected area twice daily PRN.    Rash and nonspecific skin eruption       VITAMIN D PO      Take by mouth daily

## 2018-04-06 NOTE — PROGRESS NOTES
[address] [address]  [phone]  Dept: [phone]    PRE-OP EVALUATION:  Today's date: 2018    Jeff Plasencia (: 1923) presents for pre-operative evaluation assessment as requested by Dr. Ortiz .  He requires evaluation and anesthesia risk assessment prior to undergoing surgery/procedure for treatment of : RIGHT LOWER LID ECTROPION .    Proposed Surgery/ Procedure: RIGHT LOWER LID ECTROPION REPAIR WITH SKIN GRAFT   Date of Surgery/ Procedure: 2018   Time of Surgery/ Procedure: 1:30 PM   Hospital/Surgical Facility: [facility]   Primary Physician: Lorenza Smith  Type of Anesthesia Anticipated: Local with MAC    Patient has a Health Care Directive or Living Will:  NO    1. NO - Do you have a history of heart attack, stroke, stent, bypass or surgery on an artery in the head, neck, heart or legs?  2. NO - Do you ever have any pain or discomfort in your chest?  3. NO - Do you have a history of  Heart Failure?  4. NO - Are you troubled by shortness of breath when: walking on the level, up a slight hill or at night?  5. NO - Do you currently have a cold, bronchitis or other respiratory infection?  6. NO - Do you have a cough, shortness of breath or wheezing?  7. NO - Do you sometimes get pains in the calves of your legs when you walk?  8. NO - Do you or anyone in your family have previous history of blood clots?  9. NO - Do you or does anyone in your family have a serious bleeding problem such as prolonged bleeding following surgeries or cuts?  10. NO - Have you ever had problems with anemia or been told to take iron pills?  11. NO - Have you had any abnormal blood loss such as black, tarry or bloody stools, or abnormal vaginal bleeding?  12. NO - Have you ever had a blood transfusion?  13. NO - Have you or any of your relatives ever had problems with anesthesia?  14. NO - Do you have sleep apnea, excessive snoring or daytime  drowsiness?  15. NO - Do you have any prosthetic heart valves?  16. NO - Do you have prosthetic joints?  17. NO - Is there any chance that you may be pregnant?      SKIN: improving    HPI:     HPI related to upcoming procedure: RIGHT LOWER LID ECTROPION since he was 15 yo. Saw Optometrist twice. A wedge was taken out twice. 2008 saw Dr. Wallace referred to have it fixed with Dr. Ortiz. In 2011 feel on that side.       See problem list for active medical problems.  Problems all longstanding and stable, except as noted/documented.  See ROS for pertinent symptoms related to these conditions.                                                                                                                                                          .    MEDICAL HISTORY:     Patient Active Problem List    Diagnosis Date Noted     CANCER   PROSTATE      Priority: High     Dx 2001. Grade 2+2, PSA 7.9. Cryosurgery 2002. Rx lupron 2002.        Other chronic rhinitis 02/21/2018     Priority: Medium     Tremor 04/25/2017     Priority: Medium     At high risk for falls 02/24/2014     Priority: Medium     Advanced directives, counseling/discussion 05/06/2011     Priority: Medium     Health care directive received and was notarized on 9/19/11   This document was sent to scanning on 10/11/2011 11:43 AM  Justina MENSAH [facility]         Hyperlipidemia LDL goal <130 10/31/2010     Priority: Medium     Radiculopathy of leg 06/20/2011     Priority: Low     left foot drop with left radiculopathy per neuro evaluation 6/11       Memory loss 05/06/2011     Priority: Low     Hx of supraventricular tachycardia      Priority: Low     history of non-sustained SVT, slow nonsustained VT by holter 2001. Echo 2000 normal.       Dermatophytosis of body 05/27/2005     Priority: Low     Cruris, umbilicus  Problem list name updated by automated process. Provider to review        Past Medical History:   Diagnosis Date     BENIGN ESSENTIAL TREMOR     Did not  tolerate propranolol     Calculus of kidney      Herpes zoster without mention of complication     Right T7-10 2003     Impotence of organic origin      Parotid mass 3/26/2009    CT 3/09- 2.6x 2.8 x2.9 cm Soft tissue mass within the left parotid gland. Biopsies negative.      Undiagnosed cardiac murmurs 2/9/2007    Echo 10/08 normal.      Past Surgical History:   Procedure Laterality Date     COLONOSCOPY  9/28/2011    Procedure:COLONOSCOPY; Colonoscopy  ; Surgeon:LAMAR ALLEN; Location:[facility]     SURGICAL HISTORY OF -   11/1999    Right cataract surgery     SURGICAL HISTORY OF -   12/1999    Hernia repair     SURGICAL HISTORY OF -   2001    Normal Colonoscopy     SURGICAL HISTORY OF -   8/2002    Cryosurgery of the prostate     SURGICAL HISTORY OF -   4/2003    Left cataract surgery     Current Outpatient Prescriptions   Medication Sig Dispense Refill     triamcinolone (KENALOG) 0.5 % cream Apply sparingly to affected area twice daily PRN. 30 g 2     NASONEX 50 MCG/ACT spray USE TWO SPRAY(S) IN EACH NOSTRIL ONCE DAILY 17 g 11     VITAMIN D PO Take by mouth daily        LUTEIN PO Take by mouth daily        ASPIRIN 81 MG OR TABS 1/2 tab daily 100 3     GLUCOSAMINE CHONDR 500 COMPLEX OR CAPS DAILY  0     OTC products: None, except as noted above    Allergies   Allergen Reactions     Mustard Oil Anaphylaxis     Penicillins Anaphylaxis     Atenolol Rash     Cozaar [Losartan Potassium] Hives     Inderal [Propranolol Hcl]      Nightmares, headache, chestpain     Lisinopril Cough      Latex Allergy: NO    Social History   Substance Use Topics     Smoking status: Never Smoker     Smokeless tobacco: Never Used     Alcohol use Yes      Comment: socially     History   Drug Use No       REVIEW OF SYSTEMS:   CONSTITUTIONAL: NEGATIVE for fever, chills, change in weight  ENT/MOUTH: NEGATIVE for ear, mouth and throat problems- ectropion right lower lid, glasses  RESP: NEGATIVE for significant cough or SOB  CV: NEGATIVE for  "chest pain, palpitations or peripheral edema  SKIN: rash improving    EXAM:   /76 (BP Location: Right arm, Patient Position: Sitting, Cuff Size: Adult Large)  Pulse 80  Temp 98.2  F (36.8  C) (Tympanic)  Resp 20  Ht 5' 11\" (1.803 m)  Wt 200 lb (90.7 kg)  SpO2 98%  BMI 27.89 kg/m2  GENERAL APPEARANCE: healthy, alert and no distress  HENT: ear canals and TM's normal and nose and mouth without ulcers or lesions  RESP: lungs clear to auscultation - no rales, rhonchi or wheezes  CV: regular rate and rhythm, normal S1 S2, no S3 or S4 and no murmur, click or rub   ABDOMEN: soft, nontender, no HSM or masses and bowel sounds normal  NEURO: Normal strength and tone, sensory exam grossly normal, mentation intact and speech normal  SKIN: deferred per patient  DIAGNOSTICS:   No labs or EKG required for low risk surgery (cataract, skin procedure, breast biopsy, etc)    Recent Labs   Lab Test  03/24/17   1008  11/03/16   0835  01/20/16   1115   05/06/11   1253   HGB   --   14.5  12.3*   < >   --    PLT   --   204  241   < >   --    NA  139   --   140   < >   --    POTASSIUM  4.1   --   4.2   < >   --    CR  1.03   --   1.17   < >   --    A1C   --    --    --    --   5.5    < > = values in this interval not displayed.        IMPRESSION:   Reason for surgery/procedure: ectropion of right lower lid  Diagnosis/reason for consult: surgical risk    The proposed surgical procedure is considered LOW risk.    REVISED CARDIAC RISK INDEX  The patient has the following serious cardiovascular risks for perioperative complications such as (MI, PE, VFib and 3  AV Block):  No serious cardiac risks  INTERPRETATION: 0 risks: Class I (very low risk - 0.4% complication rate)    The patient has the following additional risks for perioperative complications:  No identified additional risks      ICD-10-CM    1. Preop general physical exam Z01.818        RECOMMENDATIONS:         --Patient is to take all scheduled medications on the day of " surgery EXCEPT for modifications listed below.    Anticoagulant or Antiplatelet Medication Use  ASPIRIN: Discontinue ASA 7-10 days prior to procedure to reduce bleeding risk.  It should be resumed post-operatively.        APPROVAL GIVEN to proceed with proposed procedure, without further diagnostic evaluation     Patient Instructions   1. Preop general physical exam  Approved    2. Ectropion of right lower eyelid, unspecified ectropion type  Chronic, stable    3. Rash and nonspecific skin eruption  Acute, improved  -Keep an eye on your rash. If it doesn't completely resolve, call, and we can refer you to the Dermatologist in [place].    Before Your Surgery      Call your surgeon if there is any change in your health. This includes signs of a cold or flu (such as a sore throat, runny nose, cough, rash or fever).    Do not smoke, drink alcohol or take over the counter medicine (unless your surgeon or primary care doctor tells you to) for the 24 hours before and after surgery.    If you take prescribed drugs: Follow your doctor s orders about which medicines to take and which to stop until after surgery.    Eating and drinking prior to surgery: follow the instructions from your surgeon    Take a shower or bath the night before surgery. Use the soap your surgeon gave you to gently clean your skin. If you do not have soap from your surgeon, use your regular soap. Do not shave or scrub the surgery site.  Wear clean pajamas and have clean sheets on your bed.       Signed Electronically by: Lorenza Smith CNP    Copy of this evaluation report is provided to requesting physician.    [facility] Preop Guidelines    Revised Cardiac Risk Index

## 2018-04-06 NOTE — NURSING NOTE
"Chief Complaint   Patient presents with     Pre-Op Exam       Initial /76 (BP Location: Right arm, Patient Position: Sitting, Cuff Size: Adult Large)  Pulse 80  Temp 98.2  F (36.8  C) (Tympanic)  Resp 20  Ht 5' 11\" (1.803 m)  Wt 200 lb (90.7 kg)  SpO2 98%  BMI 27.89 kg/m2 Estimated body mass index is 27.89 kg/(m^2) as calculated from the following:    Height as of this encounter: 5' 11\" (1.803 m).    Weight as of this encounter: 200 lb (90.7 kg).      Health Maintenance that is potentially due pending provider review:  NONE    n/a    Is there anyone who you would like to be able to receive your results? No  If yes have patient fill out ALY    "

## 2018-04-06 NOTE — PATIENT INSTRUCTIONS
1. Preop general physical exam  Approved    2. Ectropion of right lower eyelid, unspecified ectropion type  Chronic, stable    3. Rash and nonspecific skin eruption  Acute, improved  -Keep an eye on your rash. If it doesn't completely resolve, call, and we can refer you to the Dermatologist in [place].    Before Your Surgery      Call your surgeon if there is any change in your health. This includes signs of a cold or flu (such as a sore throat, runny nose, cough, rash or fever).    Do not smoke, drink alcohol or take over the counter medicine (unless your surgeon or primary care doctor tells you to) for the 24 hours before and after surgery.    If you take prescribed drugs: Follow your doctor s orders about which medicines to take and which to stop until after surgery.    Eating and drinking prior to surgery: follow the instructions from your surgeon    Take a shower or bath the night before surgery. Use the soap your surgeon gave you to gently clean your skin. If you do not have soap from your surgeon, use your regular soap. Do not shave or scrub the surgery site.  Wear clean pajamas and have clean sheets on your bed.

## 2018-04-09 ENCOUNTER — OFFICE VISIT (OUTPATIENT)
Dept: FAMILY MEDICINE | Facility: CLINIC | Age: 83
End: 2018-04-09
Payer: MEDICARE

## 2018-04-09 VITALS
SYSTOLIC BLOOD PRESSURE: 136 MMHG | HEART RATE: 80 BPM | BODY MASS INDEX: 28 KG/M2 | RESPIRATION RATE: 18 BRPM | WEIGHT: 200 LBS | TEMPERATURE: 97.8 F | DIASTOLIC BLOOD PRESSURE: 76 MMHG | HEIGHT: 71 IN

## 2018-04-09 DIAGNOSIS — L30.9 ECZEMA, UNSPECIFIED TYPE: Primary | ICD-10-CM

## 2018-04-09 PROCEDURE — 99213 OFFICE O/P EST LOW 20 MIN: CPT | Performed by: FAMILY MEDICINE

## 2018-04-09 NOTE — PATIENT INSTRUCTIONS
Managing Atopic Dermatitis (Eczema)     After bathing, gently pat your skin dry (don t rub). Apply moisturizer while your skin is still damp.   To manage your symptoms and help reduce the severity and frequency, try these self-care tips:  Caring for your skin    Use a gentle, fragrance-free cleanser (or nonsoap cleanser) for bathing. Rinse well. Pat skin dry.    Take warm, not hot, baths or showers. Try to limit them to no more that 10 to 15 minutes.     Use moisturizer liberally right after you bathe, while your skin is still damp.    Avoid scratching because it will cause more damage to your skin.     Topical, over-the-counter hydrocortisone cream may help control mild symptoms.   Controlling your environment    Avoid extreme heat or cold.    Avoid very humid or very dry air.    If your home or office air is very dry, use a humidifier.    Avoid allergens, such as dust, that may be present in bedding, carpets, plush toys, or rugs.    Know that pet hair and dander can cause flare-ups.  Seeking medical treatment  Another way to keep symptoms under control is to seek medical treatment. Talk with your healthcare provider about the type of treatment that may work best for you. Your provider may prescribe treatments such as the following:    Topical treatments to put on the skin daily    Medicines taken by mouth (oral medicines), such as antihistamines, antibiotics, or corticosteroids    In severe cases shots (injections) may be needed to control the symptoms. You may even need antibiotics if skin infections occur.  Treatments don t work the same way for every person. So if your symptoms continue or get worse, ask your healthcare provider about other treatments.  Making lifestyle choices    Manage the stress in your life.    Wear loose-fitting cotton clothing that does not bind or rub your skin.    Avoid contact with wool or other scratchy fabrics.    Use fragrance-free products.  Getting good results  Now that you  know more about atopic dermatitis, the next step is up to you. Follow your healthcare provider s treatment plan and your self-care routine. This will help bring atopic dermatitis under control. If your symptoms persist, be sure to let your health care provider know.   Date Last Reviewed: 2/1/2017 2000-2017 [employer]. [address] [place] [address], [place] [address]. All rights reserved. This information is not intended as a substitute for professional medical care. Always follow your healthcare professional's instructions.

## 2018-04-09 NOTE — H&P (VIEW-ONLY)
[address] [address]  [phone]  Dept: [phone]    PRE-OP EVALUATION:  Today's date: 2018    Jeff Plasencia (: 1923) presents for pre-operative evaluation assessment as requested by Dr. Ortiz .  He requires evaluation and anesthesia risk assessment prior to undergoing surgery/procedure for treatment of : RIGHT LOWER LID ECTROPION .    Proposed Surgery/ Procedure: RIGHT LOWER LID ECTROPION REPAIR WITH SKIN GRAFT   Date of Surgery/ Procedure: 2018   Time of Surgery/ Procedure: 1:30 PM   Hospital/Surgical Facility: [facility]   Primary Physician: Lorenza Smith  Type of Anesthesia Anticipated: Local with MAC    Patient has a Health Care Directive or Living Will:  NO    1. NO - Do you have a history of heart attack, stroke, stent, bypass or surgery on an artery in the head, neck, heart or legs?  2. NO - Do you ever have any pain or discomfort in your chest?  3. NO - Do you have a history of  Heart Failure?  4. NO - Are you troubled by shortness of breath when: walking on the level, up a slight hill or at night?  5. NO - Do you currently have a cold, bronchitis or other respiratory infection?  6. NO - Do you have a cough, shortness of breath or wheezing?  7. NO - Do you sometimes get pains in the calves of your legs when you walk?  8. NO - Do you or anyone in your family have previous history of blood clots?  9. NO - Do you or does anyone in your family have a serious bleeding problem such as prolonged bleeding following surgeries or cuts?  10. NO - Have you ever had problems with anemia or been told to take iron pills?  11. NO - Have you had any abnormal blood loss such as black, tarry or bloody stools, or abnormal vaginal bleeding?  12. NO - Have you ever had a blood transfusion?  13. NO - Have you or any of your relatives ever had problems with anesthesia?  14. NO - Do you have sleep apnea, excessive snoring or daytime  drowsiness?  15. NO - Do you have any prosthetic heart valves?  16. NO - Do you have prosthetic joints?  17. NO - Is there any chance that you may be pregnant?      SKIN: improving    HPI:     HPI related to upcoming procedure: RIGHT LOWER LID ECTROPION since he was 13 yo. Saw Optometrist twice. A wedge was taken out twice. 2008 saw Dr. Wallace referred to have it fixed with Dr. Ortiz. In 2011 feel on that side.       See problem list for active medical problems.  Problems all longstanding and stable, except as noted/documented.  See ROS for pertinent symptoms related to these conditions.                                                                                                                                                          .    MEDICAL HISTORY:     Patient Active Problem List    Diagnosis Date Noted     CANCER   PROSTATE      Priority: High     Dx 2001. Grade 2+2, PSA 7.9. Cryosurgery 2002. Rx lupron 2002.        Other chronic rhinitis 02/21/2018     Priority: Medium     Tremor 04/25/2017     Priority: Medium     At high risk for falls 02/24/2014     Priority: Medium     Advanced directives, counseling/discussion 05/06/2011     Priority: Medium     Health care directive received and was notarized on 9/19/11   This document was sent to scanning on 10/11/2011 11:43 AM  Justina MENSAH [facility]         Hyperlipidemia LDL goal <130 10/31/2010     Priority: Medium     Radiculopathy of leg 06/20/2011     Priority: Low     left foot drop with left radiculopathy per neuro evaluation 6/11       Memory loss 05/06/2011     Priority: Low     Hx of supraventricular tachycardia      Priority: Low     history of non-sustained SVT, slow nonsustained VT by holter 2001. Echo 2000 normal.       Dermatophytosis of body 05/27/2005     Priority: Low     Cruris, umbilicus  Problem list name updated by automated process. Provider to review        Past Medical History:   Diagnosis Date     BENIGN ESSENTIAL TREMOR     Did not  tolerate propranolol     Calculus of kidney      Herpes zoster without mention of complication     Right T7-10 2003     Impotence of organic origin      Parotid mass 3/26/2009    CT 3/09- 2.6x 2.8 x2.9 cm Soft tissue mass within the left parotid gland. Biopsies negative.      Undiagnosed cardiac murmurs 2/9/2007    Echo 10/08 normal.      Past Surgical History:   Procedure Laterality Date     COLONOSCOPY  9/28/2011    Procedure:COLONOSCOPY; Colonoscopy  ; Surgeon:LAMAR ALLEN; Location:[facility]     SURGICAL HISTORY OF -   11/1999    Right cataract surgery     SURGICAL HISTORY OF -   12/1999    Hernia repair     SURGICAL HISTORY OF -   2001    Normal Colonoscopy     SURGICAL HISTORY OF -   8/2002    Cryosurgery of the prostate     SURGICAL HISTORY OF -   4/2003    Left cataract surgery     Current Outpatient Prescriptions   Medication Sig Dispense Refill     triamcinolone (KENALOG) 0.5 % cream Apply sparingly to affected area twice daily PRN. 30 g 2     NASONEX 50 MCG/ACT spray USE TWO SPRAY(S) IN EACH NOSTRIL ONCE DAILY 17 g 11     VITAMIN D PO Take by mouth daily        LUTEIN PO Take by mouth daily        ASPIRIN 81 MG OR TABS 1/2 tab daily 100 3     GLUCOSAMINE CHONDR 500 COMPLEX OR CAPS DAILY  0     OTC products: None, except as noted above    Allergies   Allergen Reactions     Mustard Oil Anaphylaxis     Penicillins Anaphylaxis     Atenolol Rash     Cozaar [Losartan Potassium] Hives     Inderal [Propranolol Hcl]      Nightmares, headache, chestpain     Lisinopril Cough      Latex Allergy: NO    Social History   Substance Use Topics     Smoking status: Never Smoker     Smokeless tobacco: Never Used     Alcohol use Yes      Comment: socially     History   Drug Use No       REVIEW OF SYSTEMS:   CONSTITUTIONAL: NEGATIVE for fever, chills, change in weight  ENT/MOUTH: NEGATIVE for ear, mouth and throat problems- ectropion right lower lid, glasses  RESP: NEGATIVE for significant cough or SOB  CV: NEGATIVE for  "chest pain, palpitations or peripheral edema  SKIN: rash improving    EXAM:   /76 (BP Location: Right arm, Patient Position: Sitting, Cuff Size: Adult Large)  Pulse 80  Temp 98.2  F (36.8  C) (Tympanic)  Resp 20  Ht 5' 11\" (1.803 m)  Wt 200 lb (90.7 kg)  SpO2 98%  BMI 27.89 kg/m2  GENERAL APPEARANCE: healthy, alert and no distress  HENT: ear canals and TM's normal and nose and mouth without ulcers or lesions  RESP: lungs clear to auscultation - no rales, rhonchi or wheezes  CV: regular rate and rhythm, normal S1 S2, no S3 or S4 and no murmur, click or rub   ABDOMEN: soft, nontender, no HSM or masses and bowel sounds normal  NEURO: Normal strength and tone, sensory exam grossly normal, mentation intact and speech normal  SKIN: deferred per patient  DIAGNOSTICS:   No labs or EKG required for low risk surgery (cataract, skin procedure, breast biopsy, etc)    Recent Labs   Lab Test  03/24/17   1008  11/03/16   0835  01/20/16   1115   05/06/11   1253   HGB   --   14.5  12.3*   < >   --    PLT   --   204  241   < >   --    NA  139   --   140   < >   --    POTASSIUM  4.1   --   4.2   < >   --    CR  1.03   --   1.17   < >   --    A1C   --    --    --    --   5.5    < > = values in this interval not displayed.        IMPRESSION:   Reason for surgery/procedure: ectropion of right lower lid  Diagnosis/reason for consult: surgical risk    The proposed surgical procedure is considered LOW risk.    REVISED CARDIAC RISK INDEX  The patient has the following serious cardiovascular risks for perioperative complications such as (MI, PE, VFib and 3  AV Block):  No serious cardiac risks  INTERPRETATION: 0 risks: Class I (very low risk - 0.4% complication rate)    The patient has the following additional risks for perioperative complications:  No identified additional risks      ICD-10-CM    1. Preop general physical exam Z01.818        RECOMMENDATIONS:         --Patient is to take all scheduled medications on the day of " surgery EXCEPT for modifications listed below.    Anticoagulant or Antiplatelet Medication Use  ASPIRIN: Discontinue ASA 7-10 days prior to procedure to reduce bleeding risk.  It should be resumed post-operatively.        APPROVAL GIVEN to proceed with proposed procedure, without further diagnostic evaluation     Patient Instructions   1. Preop general physical exam  Approved    2. Ectropion of right lower eyelid, unspecified ectropion type  Chronic, stable    3. Rash and nonspecific skin eruption  Acute, improved  -Keep an eye on your rash. If it doesn't completely resolve, call, and we can refer you to the Dermatologist in [place].    Before Your Surgery      Call your surgeon if there is any change in your health. This includes signs of a cold or flu (such as a sore throat, runny nose, cough, rash or fever).    Do not smoke, drink alcohol or take over the counter medicine (unless your surgeon or primary care doctor tells you to) for the 24 hours before and after surgery.    If you take prescribed drugs: Follow your doctor s orders about which medicines to take and which to stop until after surgery.    Eating and drinking prior to surgery: follow the instructions from your surgeon    Take a shower or bath the night before surgery. Use the soap your surgeon gave you to gently clean your skin. If you do not have soap from your surgeon, use your regular soap. Do not shave or scrub the surgery site.  Wear clean pajamas and have clean sheets on your bed.       Signed Electronically by: Lorenza Smith CNP    Copy of this evaluation report is provided to requesting physician.    [facility] Preop Guidelines    Revised Cardiac Risk Index

## 2018-04-09 NOTE — PROGRESS NOTES
SUBJECTIVE:   Jeff Plasencia is a 89 year old male who presents to clinic today for the following health issues:      Rash      Duration: over a month     Description  Location: Both legs   Itching: mild    Intensity:  moderate    Accompanying signs and symptoms: has gotten better, but isn't gone yet    History (similar episodes/previous evaluation): Now has a new watch with a non metal band     Precipitating or alleviating factors:  New exposures:  None  Recent travel: no      Therapies tried and outcome: Triamcinolone Cream - helping       Problem list and histories reviewed & adjusted, as indicated.  Additional history: as documented    Patient Active Problem List   Diagnosis     CANCER   PROSTATE     Dermatophytosis of body     Hx of supraventricular tachycardia     Hyperlipidemia LDL goal <130     Advanced directives, counseling/discussion     Memory loss     Radiculopathy of leg     At high risk for falls     Tremor     Other chronic rhinitis     Past Surgical History:   Procedure Laterality Date     COLONOSCOPY  9/28/2011    Procedure:COLONOSCOPY; Colonoscopy  ; Surgeon:LAMAR ALLEN; Location:[facility]     SURGICAL HISTORY OF -   11/1999    Right cataract surgery     SURGICAL HISTORY OF -   12/1999    Hernia repair     SURGICAL HISTORY OF -   2001    Normal Colonoscopy     SURGICAL HISTORY OF -   8/2002    Cryosurgery of the prostate     SURGICAL HISTORY OF -   4/2003    Left cataract surgery       Social History   Substance Use Topics     Smoking status: Never Smoker     Smokeless tobacco: Never Used     Alcohol use Yes      Comment: socially     Family History   Problem Relation Age of Onset     Hypertension Brother      DIABETES Mother      age 70s     CEREBROVASCULAR DISEASE Sister      Cancer - colorectal No family hx of          Current Outpatient Prescriptions   Medication Sig Dispense Refill     triamcinolone (KENALOG) 0.5 % cream Apply sparingly to affected area twice daily PRN. 30 g 2      NASONEX 50 MCG/ACT spray USE TWO SPRAY(S) IN EACH NOSTRIL ONCE DAILY 17 g 11     VITAMIN D PO Take by mouth daily        LUTEIN PO Take by mouth daily        ASPIRIN 81 MG OR TABS 1/2 tab daily 100 3     GLUCOSAMINE CHONDR 500 COMPLEX OR CAPS DAILY  0     Allergies   Allergen Reactions     Mustard Oil Anaphylaxis     Penicillins Anaphylaxis     Atenolol Rash     Cozaar [Losartan Potassium] Hives     Inderal [Propranolol Hcl]      Nightmares, headache, chestpain     Lisinopril Cough     Recent Labs   Lab Test  05/01/17   1102  04/26/17   0850  03/24/17   1008  11/03/16   0835  01/20/16   1115 08/21/15  08/11/14   05/06/11   1253   A1C   --    --    --    --    --    --    --    --    --   5.5   LDL   --    --    --   107*   --   121   --   118   < >   --    HDL   --    --    --   59   --   43   --   43   < >   --    TRIG   --    --    --   46   --   96   --   86   < >   --    ALT   --    --   17   --   28  27   < >  32   < >   --    CR   --    --   1.03   --   1.17  0.9   < >  0.9   < >   --    GFRESTIMATED  70   --   67   --   58*  >60   < >   --    < >   --    GFRESTBLACK  84   --   81   --   70   --    < >   --    < >   --    POTASSIUM   --    --   4.1   --   4.2  4.6   < >  4.4   < >   --    TSH   --   3.21   --    --   2.03   --    --    --    < >  2.15    < > = values in this interval not displayed.      BP Readings from Last 3 Encounters:   04/09/18 136/76   04/06/18 136/76   03/01/18 130/78    Wt Readings from Last 3 Encounters:   04/09/18 200 lb (90.7 kg)   04/06/18 200 lb (90.7 kg)   03/01/18 211 lb (95.7 kg)                  Labs reviewed in EPIC    Reviewed and updated as needed this visit by clinical staff       Reviewed and updated as needed this visit by Provider         ROS:  Constitutional, HEENT, cardiovascular, pulmonary, gi and gu systems are negative, except as otherwise noted.    OBJECTIVE:     /76 (Cuff Size: Adult Regular)  Pulse 80  Temp 97.8  F (36.6  C) (Tympanic)  Resp 18  Ht 5'  "11\" (1.803 m)  Wt 200 lb (90.7 kg)  BMI 27.89 kg/m2  Body mass index is 27.89 kg/(m^2).  GENERAL: alert and no distress  NECK: no adenopathy, no asymmetry, masses, or scars and thyroid normal to palpation  RESP: lungs clear to auscultation - no rales, rhonchi or wheezes  CV: regular rates and rhythm, normal S1 S2, no S3 or S4 and no murmur, click or rub  SKIN: mild erythema involving upper inner thigh as shown below, left wrist skin rash resolved completely                ASSESSMENT/PLAN:         ICD-10-CM    1. Eczema, unspecified type L30.9        89-year-old male presents for a follow-up visit.  Physical examination remarkable for significantly improved groin and left wrist rashes.  Suggested to use Kenalog cream only as needed.  Continue regular moisturizers and well hydration.  Patient understood and in agreement with above plan.  All questions answered.      Patient Instructions       Managing Atopic Dermatitis (Eczema)     After bathing, gently pat your skin dry (don t rub). Apply moisturizer while your skin is still damp.   To manage your symptoms and help reduce the severity and frequency, try these self-care tips:  Caring for your skin    Use a gentle, fragrance-free cleanser (or nonsoap cleanser) for bathing. Rinse well. Pat skin dry.    Take warm, not hot, baths or showers. Try to limit them to no more that 10 to 15 minutes.     Use moisturizer liberally right after you bathe, while your skin is still damp.    Avoid scratching because it will cause more damage to your skin.     Topical, over-the-counter hydrocortisone cream may help control mild symptoms.   Controlling your environment    Avoid extreme heat or cold.    Avoid very humid or very dry air.    If your home or office air is very dry, use a humidifier.    Avoid allergens, such as dust, that may be present in bedding, carpets, plush toys, or rugs.    Know that pet hair and dander can cause flare-ups.  Seeking medical treatment  Another way to " keep symptoms under control is to seek medical treatment. Talk with your healthcare provider about the type of treatment that may work best for you. Your provider may prescribe treatments such as the following:    Topical treatments to put on the skin daily    Medicines taken by mouth (oral medicines), such as antihistamines, antibiotics, or corticosteroids    In severe cases shots (injections) may be needed to control the symptoms. You may even need antibiotics if skin infections occur.  Treatments don t work the same way for every person. So if your symptoms continue or get worse, ask your healthcare provider about other treatments.  Making lifestyle choices    Manage the stress in your life.    Wear loose-fitting cotton clothing that does not bind or rub your skin.    Avoid contact with wool or other scratchy fabrics.    Use fragrance-free products.  Getting good results  Now that you know more about atopic dermatitis, the next step is up to you. Follow your healthcare provider s treatment plan and your self-care routine. This will help bring atopic dermatitis under control. If your symptoms persist, be sure to let your health care provider know.   Date Last Reviewed: 2/1/2017 2000-2017 [employer]. [address] [place] [address], [place] [address]. All rights reserved. This information is not intended as a substitute for professional medical care. Always follow your healthcare professional's instructions.            Jostin Carlson MD  [facility]

## 2018-04-09 NOTE — MR AVS SNAPSHOT
After Visit Summary   4/9/2018    Jeff Plasencia    MRN: [id number]           Patient Information     Date Of Birth          5/20/1923        Visit Information        Provider Department      4/9/2018 4:20 PM Jostin Carlson MD [facility]        Today's Diagnoses     Eczema, unspecified type    -  1      Care Instructions      Managing Atopic Dermatitis (Eczema)     After bathing, gently pat your skin dry (don t rub). Apply moisturizer while your skin is still damp.   To manage your symptoms and help reduce the severity and frequency, try these self-care tips:  Caring for your skin    Use a gentle, fragrance-free cleanser (or nonsoap cleanser) for bathing. Rinse well. Pat skin dry.    Take warm, not hot, baths or showers. Try to limit them to no more that 10 to 15 minutes.     Use moisturizer liberally right after you bathe, while your skin is still damp.    Avoid scratching because it will cause more damage to your skin.     Topical, over-the-counter hydrocortisone cream may help control mild symptoms.   Controlling your environment    Avoid extreme heat or cold.    Avoid very humid or very dry air.    If your home or office air is very dry, use a humidifier.    Avoid allergens, such as dust, that may be present in bedding, carpets, plush toys, or rugs.    Know that pet hair and dander can cause flare-ups.  Seeking medical treatment  Another way to keep symptoms under control is to seek medical treatment. Talk with your healthcare provider about the type of treatment that may work best for you. Your provider may prescribe treatments such as the following:    Topical treatments to put on the skin daily    Medicines taken by mouth (oral medicines), such as antihistamines, antibiotics, or corticosteroids    In severe cases shots (injections) may be needed to control the symptoms. You may even need antibiotics if skin infections occur.  Treatments don t work the same way for every  person. So if your symptoms continue or get worse, ask your healthcare provider about other treatments.  Making lifestyle choices    Manage the stress in your life.    Wear loose-fitting cotton clothing that does not bind or rub your skin.    Avoid contact with wool or other scratchy fabrics.    Use fragrance-free products.  Getting good results  Now that you know more about atopic dermatitis, the next step is up to you. Follow your healthcare provider s treatment plan and your self-care routine. This will help bring atopic dermatitis under control. If your symptoms persist, be sure to let your health care provider know.   Date Last Reviewed: 2/1/2017 2000-2017 [employer]. [address] [place] [address]. All rights reserved. This information is not intended as a substitute for professional medical care. Always follow your healthcare professional's instructions.                Follow-ups after your visit        Your next 10 appointments already scheduled     Apr 12, 2018   Procedure with Garry Ortiz MD   [facility] (--)    [address]  [facility] [address]   [phone]            Apr 25, 2018 10:15 AM CDT   [facility]ias MD   [facility]    [address] [facility] [address] [phone]              Who to contact     If you have questions or need follow up information about today's clinic visit or your schedule please contact [facility] directly at [phone].  Normal or non-critical lab and imaging results will be communicated to you by MyChart, letter or phone within 4 business days after the clinic has received the results. If you do not hear from us within 7 days, please contact the clinic through MyChart or phone. If you have a critical or abnormal lab result, we will notify you by phone as soon as possible.  Submit refill requests through  "MyChart or call your pharmacy and they will forward the refill request to us. Please allow 3 business days for your refill to be completed.          Additional Information About Your Visit        MyChart Information     [employer] lets you send messages to your doctor, view your test results, renew your prescriptions, schedule appointments and more. To sign up, go to [email or web address] . Click on \"Log in\" on the left side of the screen, which will take you to the Welcome page. Then click on \"Sign up Now\" on the right side of the page.     You will be asked to enter the access code listed below, as well as some personal information. Please follow the directions to create your username and password.     Your access code is: [email or web address]  Expires: 2018  3:28 PM     Your access code will  in 90 days. If you need help or a new code, please call your [place] clinic or [phone].        Care EveryWhere ID     This is your Care EveryWhere ID. This could be used by other organizations to access your [place] medical records  [id number]        Your Vitals Were     Pulse Temperature Respirations Height BMI (Body Mass Index)       80 97.8  F (36.6  C) (Tympanic) 18 5' 11\" (1.803 m) 27.89 kg/m2        Blood Pressure from Last 3 Encounters:   18 136/76   18 136/76   18 130/78    Weight from Last 3 Encounters:   18 200 lb (90.7 kg)   18 200 lb (90.7 kg)   18 211 lb (95.7 kg)              Today, you had the following     No orders found for display       Primary Care Provider Office Phone # Fax #    Lorenza Smith [phone]       [address] [place] [address]        Equal Access to Services     JOHAN BECK : Lainey Gay, mercedez qureshi, arpita jimenez. [facility] [phone].    ATENCIÓN: Si habla opalañol, tiene a gabriel disposición servicios gratuitos de asistencia " lingüística. Deja al [phone].    We comply with applicable federal civil rights laws and [place] laws. We do not discriminate on the basis of race, color, national origin, age, disability, sex, sexual orientation, or gender identity.            Thank you!     Thank you for choosing [facility]  for your care. Our goal is always to provide you with excellent care. Hearing back from our patients is one way we can continue to improve our services. Please take a few minutes to complete the written survey that you may receive in the mail after your visit with us. Thank you!             Your Updated Medication List - Protect others around you: Learn how to safely use, store and throw away your medicines at www.disposemymeds.org.          This list is accurate as of 4/9/18  4:41 PM.  Always use your most recent med list.                   Brand Name Dispense Instructions for use Diagnosis    aspirin 81 MG tablet     100    1/2 tab daily        glucosamine chondroitin 500 complex Caps      DAILY        LUTEIN PO      Take by mouth daily        NASONEX 50 MCG/ACT spray   Generic drug:  mometasone     17 g    USE TWO SPRAY(S) IN EACH NOSTRIL ONCE DAILY    Other chronic rhinitis       triamcinolone 0.5 % cream    KENALOG    30 g    Apply sparingly to affected area twice daily PRN.    Rash and nonspecific skin eruption       VITAMIN D PO      Take by mouth daily

## 2018-04-09 NOTE — NURSING NOTE
"Chief Complaint   Patient presents with     Derm Problem       Initial /76 (Cuff Size: Adult Regular)  Pulse 80  Temp 97.8  F (36.6  C) (Tympanic)  Resp 18  Ht 5' 11\" (1.803 m)  Wt 200 lb (90.7 kg)  BMI 27.89 kg/m2 Estimated body mass index is 27.89 kg/(m^2) as calculated from the following:    Height as of this encounter: 5' 11\" (1.803 m).    Weight as of this encounter: 200 lb (90.7 kg).      Health Maintenance that is potentially due pending provider review:  L[facility]    [facility]    Is there anyone who you would like to be able to receive your results? Not Applicable  If yes have patient fill out ALY    "

## 2018-04-12 ENCOUNTER — ANESTHESIA (OUTPATIENT)
Dept: SURGERY | Facility: CLINIC | Age: 83
End: 2018-04-12
Payer: MEDICARE

## 2018-04-12 ENCOUNTER — ANESTHESIA EVENT (OUTPATIENT)
Dept: SURGERY | Facility: CLINIC | Age: 83
End: 2018-04-12
Payer: MEDICARE

## 2018-04-12 ENCOUNTER — HOSPITAL ENCOUNTER (OUTPATIENT)
Facility: CLINIC | Age: 83
Discharge: HOME | End: 2018-04-12
Attending: OPHTHALMOLOGY | Admitting: OPHTHALMOLOGY
Payer: MEDICARE

## 2018-04-12 ENCOUNTER — SURGERY (OUTPATIENT)
Age: 83
End: 2018-04-12

## 2018-04-12 VITALS
RESPIRATION RATE: 165 BRPM | HEIGHT: 71 IN | OXYGEN SATURATION: 97 % | TEMPERATURE: 97.2 F | DIASTOLIC BLOOD PRESSURE: 92 MMHG | SYSTOLIC BLOOD PRESSURE: 160 MMHG | BODY MASS INDEX: 27.44 KG/M2 | WEIGHT: 196 LBS

## 2018-04-12 DIAGNOSIS — Z98.890 POSTOPERATIVE EYE STATE: Primary | ICD-10-CM

## 2018-04-12 PROCEDURE — 27210794 ZZH OR GENERAL SUPPLY STERILE: Performed by: OPHTHALMOLOGY

## 2018-04-12 PROCEDURE — 71000012 ZZH RECOVERY PHASE 1 LEVEL 1 FIRST HR: Performed by: OPHTHALMOLOGY

## 2018-04-12 PROCEDURE — 71000027 ZZH RECOVERY PHASE 2 EACH 15 MINS: Performed by: OPHTHALMOLOGY

## 2018-04-12 PROCEDURE — 36000056 ZZH SURGERY LEVEL 3 1ST 30 MIN: Performed by: OPHTHALMOLOGY

## 2018-04-12 PROCEDURE — 36000058 ZZH SURGERY LEVEL 3 EA 15 ADDTL MIN: Performed by: OPHTHALMOLOGY

## 2018-04-12 PROCEDURE — 25000125 ZZHC RX 250: Performed by: OPHTHALMOLOGY

## 2018-04-12 PROCEDURE — 37000009 ZZH ANESTHESIA TECHNICAL FEE, EACH ADDTL 15 MIN: Performed by: OPHTHALMOLOGY

## 2018-04-12 PROCEDURE — 25000128 H RX IP 250 OP 636: Performed by: NURSE ANESTHETIST, CERTIFIED REGISTERED

## 2018-04-12 PROCEDURE — 40000170 ZZH STATISTIC PRE-PROCEDURE ASSESSMENT II: Performed by: OPHTHALMOLOGY

## 2018-04-12 PROCEDURE — 25000125 ZZHC RX 250: Performed by: NURSE ANESTHETIST, CERTIFIED REGISTERED

## 2018-04-12 PROCEDURE — 37000008 ZZH ANESTHESIA TECHNICAL FEE, 1ST 30 MIN: Performed by: OPHTHALMOLOGY

## 2018-04-12 RX ORDER — ERYTHROMYCIN 5 MG/G
OINTMENT OPHTHALMIC
Qty: 3.5 G | Refills: 0 | Status: SHIPPED | OUTPATIENT
Start: 2018-04-12 | End: 2018-04-25

## 2018-04-12 RX ORDER — FENTANYL CITRATE 50 UG/ML
INJECTION, SOLUTION INTRAMUSCULAR; INTRAVENOUS PRN
Status: DISCONTINUED | OUTPATIENT
Start: 2018-04-12 | End: 2018-04-12

## 2018-04-12 RX ORDER — ONDANSETRON 2 MG/ML
INJECTION INTRAMUSCULAR; INTRAVENOUS PRN
Status: DISCONTINUED | OUTPATIENT
Start: 2018-04-12 | End: 2018-04-12

## 2018-04-12 RX ORDER — FENTANYL CITRATE 50 UG/ML
25-50 INJECTION, SOLUTION INTRAMUSCULAR; INTRAVENOUS
Status: DISCONTINUED | OUTPATIENT
Start: 2018-04-12 | End: 2018-04-12 | Stop reason: HOSPADM

## 2018-04-12 RX ORDER — ONDANSETRON 2 MG/ML
4 INJECTION INTRAMUSCULAR; INTRAVENOUS EVERY 30 MIN PRN
Status: DISCONTINUED | OUTPATIENT
Start: 2018-04-12 | End: 2018-04-12 | Stop reason: HOSPADM

## 2018-04-12 RX ORDER — FENTANYL CITRATE 50 UG/ML
25-50 INJECTION, SOLUTION INTRAMUSCULAR; INTRAVENOUS EVERY 5 MIN PRN
Status: DISCONTINUED | OUTPATIENT
Start: 2018-04-12 | End: 2018-04-12 | Stop reason: HOSPADM

## 2018-04-12 RX ORDER — ONDANSETRON 4 MG/1
4 TABLET, ORALLY DISINTEGRATING ORAL EVERY 30 MIN PRN
Status: DISCONTINUED | OUTPATIENT
Start: 2018-04-12 | End: 2018-04-12 | Stop reason: HOSPADM

## 2018-04-12 RX ORDER — TETRACAINE HYDROCHLORIDE 5 MG/ML
SOLUTION OPHTHALMIC
Status: DISCONTINUED
Start: 2018-04-12 | End: 2018-04-12 | Stop reason: WASHOUT

## 2018-04-12 RX ORDER — ERYTHROMYCIN 5 MG/G
OINTMENT OPHTHALMIC
Status: DISCONTINUED
Start: 2018-04-12 | End: 2018-04-12 | Stop reason: HOSPADM

## 2018-04-12 RX ORDER — PHYSOSTIGMINE SALICYLATE 1 MG/ML
1.2 INJECTION INTRAVENOUS
Status: DISCONTINUED | OUTPATIENT
Start: 2018-04-12 | End: 2018-04-12 | Stop reason: HOSPADM

## 2018-04-12 RX ORDER — SODIUM CHLORIDE, SODIUM LACTATE, POTASSIUM CHLORIDE, CALCIUM CHLORIDE 600; 310; 30; 20 MG/100ML; MG/100ML; MG/100ML; MG/100ML
INJECTION, SOLUTION INTRAVENOUS CONTINUOUS
Status: DISCONTINUED | OUTPATIENT
Start: 2018-04-12 | End: 2018-04-12 | Stop reason: HOSPADM

## 2018-04-12 RX ORDER — ERYTHROMYCIN 5 MG/G
OINTMENT OPHTHALMIC PRN
Status: DISCONTINUED | OUTPATIENT
Start: 2018-04-12 | End: 2018-04-12 | Stop reason: HOSPADM

## 2018-04-12 RX ORDER — PROPOFOL 10 MG/ML
INJECTION, EMULSION INTRAVENOUS PRN
Status: DISCONTINUED | OUTPATIENT
Start: 2018-04-12 | End: 2018-04-12

## 2018-04-12 RX ORDER — DEXAMETHASONE SODIUM PHOSPHATE 4 MG/ML
INJECTION, SOLUTION INTRA-ARTICULAR; INTRALESIONAL; INTRAMUSCULAR; INTRAVENOUS; SOFT TISSUE PRN
Status: DISCONTINUED | OUTPATIENT
Start: 2018-04-12 | End: 2018-04-12

## 2018-04-12 RX ORDER — SODIUM CHLORIDE, SODIUM LACTATE, POTASSIUM CHLORIDE, CALCIUM CHLORIDE 600; 310; 30; 20 MG/100ML; MG/100ML; MG/100ML; MG/100ML
INJECTION, SOLUTION INTRAVENOUS CONTINUOUS PRN
Status: DISCONTINUED | OUTPATIENT
Start: 2018-04-12 | End: 2018-04-12

## 2018-04-12 RX ORDER — LIDOCAINE HYDROCHLORIDE AND EPINEPHRINE 10; 10 MG/ML; UG/ML
INJECTION, SOLUTION INFILTRATION; PERINEURAL PRN
Status: DISCONTINUED | OUTPATIENT
Start: 2018-04-12 | End: 2018-04-12 | Stop reason: HOSPADM

## 2018-04-12 RX ORDER — NALOXONE HYDROCHLORIDE 0.4 MG/ML
.1-.4 INJECTION, SOLUTION INTRAMUSCULAR; INTRAVENOUS; SUBCUTANEOUS
Status: DISCONTINUED | OUTPATIENT
Start: 2018-04-12 | End: 2018-04-12 | Stop reason: HOSPADM

## 2018-04-12 RX ORDER — HYDROCODONE BITARTRATE AND ACETAMINOPHEN 5; 325 MG/1; MG/1
1 TABLET ORAL EVERY 6 HOURS PRN
Qty: 10 TABLET | Refills: 0 | Status: SHIPPED | OUTPATIENT
Start: 2018-04-12 | End: 2018-06-12

## 2018-04-12 RX ORDER — MEPERIDINE HYDROCHLORIDE 25 MG/ML
12.5 INJECTION INTRAMUSCULAR; INTRAVENOUS; SUBCUTANEOUS
Status: DISCONTINUED | OUTPATIENT
Start: 2018-04-12 | End: 2018-04-12 | Stop reason: HOSPADM

## 2018-04-12 RX ORDER — HYDROMORPHONE HYDROCHLORIDE 1 MG/ML
.3-.5 INJECTION, SOLUTION INTRAMUSCULAR; INTRAVENOUS; SUBCUTANEOUS EVERY 10 MIN PRN
Status: DISCONTINUED | OUTPATIENT
Start: 2018-04-12 | End: 2018-04-12 | Stop reason: HOSPADM

## 2018-04-12 RX ADMIN — LIDOCAINE HYDROCHLORIDE AND EPINEPHRINE 6 ML: 10; 10 INJECTION, SOLUTION INFILTRATION; PERINEURAL at 13:40

## 2018-04-12 RX ADMIN — FENTANYL CITRATE 25 MCG: 50 INJECTION, SOLUTION INTRAMUSCULAR; INTRAVENOUS at 13:24

## 2018-04-12 RX ADMIN — DEXMEDETOMIDINE HYDROCHLORIDE 8 MCG: 100 INJECTION, SOLUTION INTRAVENOUS at 13:24

## 2018-04-12 RX ADMIN — SODIUM CHLORIDE, POTASSIUM CHLORIDE, SODIUM LACTATE AND CALCIUM CHLORIDE: 600; 310; 30; 20 INJECTION, SOLUTION INTRAVENOUS at 13:24

## 2018-04-12 RX ADMIN — ONDANSETRON 4 MG: 2 INJECTION INTRAMUSCULAR; INTRAVENOUS at 13:47

## 2018-04-12 RX ADMIN — DEXAMETHASONE SODIUM PHOSPHATE 4 MG: 4 INJECTION, SOLUTION INTRA-ARTICULAR; INTRALESIONAL; INTRAMUSCULAR; INTRAVENOUS; SOFT TISSUE at 13:24

## 2018-04-12 RX ADMIN — ERYTHROMYCIN 1 G: 5 OINTMENT OPHTHALMIC at 13:40

## 2018-04-12 RX ADMIN — PROPOFOL 40 MG: 10 INJECTION, EMULSION INTRAVENOUS at 13:24

## 2018-04-12 ASSESSMENT — ENCOUNTER SYMPTOMS: DYSRHYTHMIAS: 1

## 2018-04-12 NOTE — ANESTHESIA POSTPROCEDURE EVALUATION
Patient: Jeff Plasencia    Procedure(s):  RIGHT LOWER LID ECTROPION REPAIR WITH SKIN GRAFT  - Wound Class: I-Clean    Diagnosis:RIGHT LOWER LID ECTROPION  Diagnosis Additional Information: No value filed.    Anesthesia Type:  MAC    Note:  Anesthesia Post Evaluation    Patient location during evaluation: PACU  Patient participation: Able to fully participate in evaluation  Level of consciousness: awake  Pain management: adequate  Airway patency: patent  Cardiovascular status: acceptable  Respiratory status: acceptable  Hydration status: acceptable  PONV: none     Anesthetic complications: None          Last vitals:  Vitals:    04/12/18 1121 04/12/18 1408   BP: (!) 194/94 135/82   Resp: 16 14   Temp: 37.1  C (98.7  F)    SpO2: 97% 97%         Electronically Signed By: Bertram Evans MD  April 12, 2018  2:21 PM

## 2018-04-12 NOTE — OP NOTE
PREOPERATIVE DIAGNOSIS: Right lower eyelid ectropion.   POSTOPERATIVE DIAGNOSIS: Right lower eyelid ectropion.   PROCEDURE: Right  lower eyelid ectropion repair with full-thickness skin graft (right  preauricular area donor site) and temporary tarsorrhaphy.   SURGEON: Garry Ortiz MD  ASSISTANT: Bryanna Paula MD  ANESTHESIA: Monitored with local infiltration, 50/50 mixture of 2% lidocaine with epinephrine and 0.5% Marcaine.   COMPLICATIONS: None.   ESTIMATED BLOOD LOSS: Less than 5 mL.   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 After the risks, benefits and alternatives to the proposed procedure were explained, informed consent was obtained.   DESCRIPTION OF PROCEDURE: Jeff Plasencia  was brought to the operating room and placed supine on the operating table. IV sedation was given. The right  lower lid and lateral canthal area were infiltrated with local anesthetic. The right upper lid was also infiltrated as was the right  preauricular area. The area was prepped and draped in the typical sterile fashion for oculoplastic surgery. Attention was directed to the right  side. A subciliary incision was made with a 15 blade and cicatricial forces released with the Stephon scissors. Hemostasis was obtained. The lateral tarsus was secured to the lateral orbital rim periosteum with a 5-0 PDS suture. The skin deficit was marked on a template. This was placed in the preauricular space where an ellipse was drawn around it. The preauricular skin was incised with a #15 blade. The graft was excised with a Stephon scissors. Hemostasis was obtained. The donor site was closed with interrupted buried 5-0 Vicryl sutures to close the deep tissues. Skin was closed with running 6-0 chromic gut suture. The graft was trimmed to fit in the lower lid defect. It was secured with interrupted 6-0 chromic sutures in the cardinal  positions and running 6-0 plain gut suture superiorly and inferiorly. A 5-0 Prolene suture was placed to the lid margin and lower eyelid. The double-armed suture was then brought through the upper eyelid margin as a temporary tarsorrhaphy. A bolster dressing of Telfa and cotton was then placed over the graft. The Prolene suture was then secured over the bolster in a horizontal mattress fashion. Another suture was placed medially. Jeff Plasencia  tolerated the procedure well. Antibiotic ointment was applied and Jeff Plasencia  left the operating room in stable condition.   JOSE SALAZAR MD

## 2018-04-12 NOTE — DISCHARGE INSTRUCTIONS
Post-operative Instructions    Ophthalmic Plastic and Reconstructive Surgery  Garry Ortiz M.D.  Bryanna Paula M.D.    All instructions apply to the operated eye(s) or eyelid(s)      What to expect after surgery:    Thre will be some swelling, bruising, and likely a black eye (even into the lower eyelids and cheeks). Also expect crusting and discharge from the eye and/or incisions.     A small amount of surface bleeding is normal for the first 48 hours after surgery.    You may notice some bloody tears for the first few days after surgery. This is normal.    Your eye(s) and eyelid(s) may be painful and tender. This is normal after surgery. Use the pain medication as prescribed. If your pain does not improve despite the medication, contact the office.    Wound care and personal care:    If a patch or bandage has been placed, please leave this in place until seen in clinic. Prevent the bandage from getting wet.     Apply ice compresses 15 minutes on 15 minutes off while awake for the first 2 days after surgery, then switch to warm compresses 4 times a day until seen by your physician.     For warm packs you can place a cup of dry uncooked rice in a clean cotton sock. Place sock in microwave 30 seconds to one minute. Next place the warm sock into a plastic bag and wrap the bag with clean warm wet washcloth and place over operated eye.      You may shower or wash your hair the day after surgery. Do not bathe or go swimming for 1 week to prevent contamination of your wounds.    Do not apply make-up to the eyes or eyelids for 2 weeks after surgery.      Activity restrictions and driving:    Avoid heavy lifting, bending, exercise or strenuous activity for 1 week after surgery.    You may resume other activities and return to work as tolerated.    You may not resume driving until have you stopped using narcotic pain medications(such as Norco, Percocet, Tylenol #3).    Medications:    Restart all your regular home  medications and eye drops today. If you take Plavix or Aspirin on a regular basis, wait for 3 days after your surgery before restarting these in order to decrease the risk of bleeding complications.    Avoid aspirin and aspirin-like medications (Motrin, Aleve, Ibuprofen, Tesha-[place] etc) for 5 days to reduce the risk of bleeding. You may take Tylenol (acetaminophen) for pain.    In addition to your home medications, take the following post-operative medications as prescribed by your physician:    Apply antibiotic ointment (erythromycin) to all sutures three times a day, and into the operated eye(s) at night.     Take 1 to 2 pain pills (norco or tylenol 3 as prescribed) as needed for pain up to every 4 hours.    The pain pills may make you drowsy. You must not drive a car, operate heavy machinery or drink alcohol while taking them.    The pain pills may cause constipation and nausea. Take them with some food to prevent a stomach upset. If you continue to experience nausea, call your physician.      WARNING: All the prescription pain medications listed above contain Tylenol (acetaminophen). You must not take more than 4,000 mg of acetaminophen per 24-hour period. This is equivalent to 6 tablets of Darvocet, 8 tablets of Vicodin, or 12 tablets of Norco, Percocet or Tylenol #3. If you take other over-the-counter medications containing acetaminophen, you must take the amount of acetaminophen into account and reduce the number of prescribed pain pills accordingly.    Contact information and follow-up:    Return to the Eye Clinic for a follow-up appointment with your physician as  scheduled. If no appointment has been scheduled, call [phone] for an  appointment with Dr. Ortiz within 1 to 2 weeks from your date of surgery.    For severe pain, bleeding, or loss of vision, call the Eye Clinic at [phone].    An on call person can be reached after hours for concerns. The on call doctor should not call in  medication refill requests after hours or on weekends, so please plan accordingly. An effort has been made to provide adequate pain medications following every surgery, and refills will not be provided in most instances. Narcotic pain medications cannot be called in.       Same Day Surgery Discharge Instructions for  Sedation and General Anesthesia       It's not unusual to feel dizzy, light-headed or faint for up to 24 hours after surgery or while taking pain medication.  If you have these symptoms: sit for a few minutes before standing and have someone assist you when you get up to walk or use the bathroom.      You should rest and relax for the next 24 hours. We recommend you make arrangements to have an adult stay with you for at least 24 hours after your discharge.  Avoid hazardous and strenuous activity.      DO NOT DRIVE any vehicle or operate mechanical equipment for 24 hours following the end of your surgery.  Even though you may feel normal, your reactions may be affected by the medication you have received.      Do not drink alcoholic beverages for 24 hours following surgery.       Slowly progress to your regular diet as you feel able. It's not unusual to feel nauseated and/or vomit after receiving anesthesia.  If you develop these symptoms, drink clear liquids (apple juice, ginger ale, broth, 7-up, etc. ) until you feel better.  If your nausea and vomiting persists for 24 hours, please notify your surgeon.        All narcotic pain medications, along with inactivity and anesthesia, can cause constipation. Drinking plenty of liquids and increasing fiber intake will help.      For any questions of a medical nature, call your surgeon.      Do not make important decisions for 24 hours.      If you had general anesthesia, you may have a sore throat for a couple of days related to the breathing tube used during surgery.  You may use Cepacol lozenges to help with this discomfort.  If it worsens or if you develop  a fever, contact your surgeon.       If you feel your pain is not well managed with the pain medications prescribed by your surgeon, please contact your surgeon's office to let them know so they can address your concerns.

## 2018-04-12 NOTE — ANESTHESIA PREPROCEDURE EVALUATION
Anesthesia Evaluation     . Pt has had prior anesthetic.            ROS/MED HX    ENT/Pulmonary:       Neurologic:     (+)dementia,     Cardiovascular:     (+) ----. : . . . :. dysrhythmias a-flutter, valvular problems/murmurs .       METS/Exercise Tolerance:     Hematologic:         Musculoskeletal:         GI/Hepatic:         Renal/Genitourinary:     (+) chronic renal disease, Nephrolithiasis ,       Endo:         Psychiatric:         Infectious Disease:         Malignancy:   (+) Malignancy History of Prostate          Other:                                    Anesthesia Plan      History & Physical Review  History and physical reviewed and following examination; no interval change.    ASA Status:  3 .        Plan for MAC with Intravenous induction. Maintenance will be TIVA.    PONV prophylaxis:  Ondansetron (or other 5HT-3) and Dexamethasone or Solumedrol       Postoperative Care  Postoperative pain management:  IV analgesics and Oral pain medications.      Consents  Anesthetic plan, risks, benefits and alternatives discussed with:  Patient..                          .

## 2018-04-12 NOTE — IP AVS SNAPSHOT
MRN:[id number]                      After Visit Summary   4/12/2018    Jeff Plasencia    MRN: [id number]           Thank you!     Thank you for choosing [facility] for your care. Our goal is always to provide you with excellent care. Hearing back from our patients is one way we can continue to improve our services. Please take a few minutes to complete the written survey that you may receive in the mail after you visit with us. Thank you!        Patient Information     Date Of Birth          5/20/1923        About your hospital stay     You were admitted on:  April 12, 2018 You last received care in the:  [facility]    You were discharged on:  April 12, 2018       Who to Call     For medical emergencies, please call 911.  For non-urgent questions about your medical care, please call your primary care provider or clinic, [phone]  For questions related to your surgery, please call your surgery clinic        Attending Provider     Provider Specialty    Garry Ortiz MD Ophthalmology       Primary Care Provider Office Phone # Fax #    Lorenza Abbott Luis, [facility] [phone]      After Care Instructions     Discharge Medication Instructions       Do NOT take aspirin or medications containing NSAIDS for 72 hours after procedure.            Ice to affected area       Apply cold pack for 15 minutes on, 15 minutes off, for 48 hours while awake.                  Your next 10 appointments already scheduled     Apr 25, 2018 10:15 AM CDT   [facility]    [address] [facility] [address]   [phone]              Further instructions from your care team       Post-operative Instructions    Ophthalmic Plastic and Reconstructive Surgery  Garry Ortiz M.D.  Bryanna Paula M.D.    All instructions apply to the operated eye(s) or eyelid(s)      What to expect after  surgery:    Thre will be some swelling, bruising, and likely a black eye (even into the lower eyelids and cheeks). Also expect crusting and discharge from the eye and/or incisions.     A small amount of surface bleeding is normal for the first 48 hours after surgery.    You may notice some bloody tears for the first few days after surgery. This is normal.    Your eye(s) and eyelid(s) may be painful and tender. This is normal after surgery. Use the pain medication as prescribed. If your pain does not improve despite the medication, contact the office.    Wound care and personal care:    If a patch or bandage has been placed, please leave this in place until seen in clinic. Prevent the bandage from getting wet.     Apply ice compresses 15 minutes on 15 minutes off while awake for the first 2 days after surgery, then switch to warm compresses 4 times a day until seen by your physician.     For warm packs you can place a cup of dry uncooked rice in a clean cotton sock. Place sock in microwave 30 seconds to one minute. Next place the warm sock into a plastic bag and wrap the bag with clean warm wet washcloth and place over operated eye.      You may shower or wash your hair the day after surgery. Do not bathe or go swimming for 1 week to prevent contamination of your wounds.    Do not apply make-up to the eyes or eyelids for 2 weeks after surgery.      Activity restrictions and driving:    Avoid heavy lifting, bending, exercise or strenuous activity for 1 week after surgery.    You may resume other activities and return to work as tolerated.    You may not resume driving until have you stopped using narcotic pain medications(such as Norco, Percocet, Tylenol #3).    Medications:    Restart all your regular home medications and eye drops today. If you take Plavix or Aspirin on a regular basis, wait for 3 days after your surgery before restarting these in order to decrease the risk of bleeding complications.    Avoid  aspirin and aspirin-like medications (Motrin, Aleve, Ibuprofen, [place] etc) for 5 days to reduce the risk of bleeding. You may take Tylenol (acetaminophen) for pain.    In addition to your home medications, take the following post-operative medications as prescribed by your physician:    Apply antibiotic ointment (erythromycin) to all sutures three times a day, and into the operated eye(s) at night.     Take 1 to 2 pain pills (norco or tylenol 3 as prescribed) as needed for pain up to every 4 hours.    The pain pills may make you drowsy. You must not drive a car, operate heavy machinery or drink alcohol while taking them.    The pain pills may cause constipation and nausea. Take them with some food to prevent a stomach upset. If you continue to experience nausea, call your physician.      WARNING: All the prescription pain medications listed above contain Tylenol (acetaminophen). You must not take more than 4,000 mg of acetaminophen per 24-hour period. This is equivalent to 6 tablets of Darvocet, 8 tablets of Vicodin, or 12 tablets of Norco, Percocet or Tylenol #3. If you take other over-the-counter medications containing acetaminophen, you must take the amount of acetaminophen into account and reduce the number of prescribed pain pills accordingly.    Contact information and follow-up:    Return to the Eye Clinic for a follow-up appointment with your physician as  scheduled. If no appointment has been scheduled, call [phone] for an  appointment with Dr. Ortiz within 1 to 2 weeks from your date of surgery.    For severe pain, bleeding, or loss of vision, call the Eye Clinic at [phone].    An on call person can be reached after hours for concerns. The on call doctor should not call in medication refill requests after hours or on weekends, so please plan accordingly. An effort has been made to provide adequate pain medications following every surgery, and refills will not be provided in most  instances. Narcotic pain medications cannot be called in.       Same Day Surgery Discharge Instructions for  Sedation and General Anesthesia       It's not unusual to feel dizzy, light-headed or faint for up to 24 hours after surgery or while taking pain medication.  If you have these symptoms: sit for a few minutes before standing and have someone assist you when you get up to walk or use the bathroom.      You should rest and relax for the next 24 hours. We recommend you make arrangements to have an adult stay with you for at least 24 hours after your discharge.  Avoid hazardous and strenuous activity.      DO NOT DRIVE any vehicle or operate mechanical equipment for 24 hours following the end of your surgery.  Even though you may feel normal, your reactions may be affected by the medication you have received.      Do not drink alcoholic beverages for 24 hours following surgery.       Slowly progress to your regular diet as you feel able. It's not unusual to feel nauseated and/or vomit after receiving anesthesia.  If you develop these symptoms, drink clear liquids (apple juice, ginger ale, broth, 7-up, etc. ) until you feel better.  If your nausea and vomiting persists for 24 hours, please notify your surgeon.        All narcotic pain medications, along with inactivity and anesthesia, can cause constipation. Drinking plenty of liquids and increasing fiber intake will help.      For any questions of a medical nature, call your surgeon.      Do not make important decisions for 24 hours.      If you had general anesthesia, you may have a sore throat for a couple of days related to the breathing tube used during surgery.  You may use Cepacol lozenges to help with this discomfort.  If it worsens or if you develop a fever, contact your surgeon.       If you feel your pain is not well managed with the pain medications prescribed by your surgeon, please contact your surgeon's office to let them know so they can address  "your concerns.             Pending Results     No orders found from 4/10/2018 to 2018.            Admission Information     Date & Time Provider Department Dept. Phone    2018 Garry Ortiz MD [facility] Same Day [facility] [phone]      Your Vitals Were     Blood Pressure Temperature Respirations Height Weight Pulse Oximetry    133/92 97.2  F (36.2  C) (Temporal) 13 1.803 m (5' 11\") 88.9 kg (196 lb) 94%    BMI (Body Mass Index)                   27.34 kg/m2           [employer] Information     [employer] + lets you send messages to your doctor, view your test results, renew your prescriptions, schedule appointments and more. To sign up, go to [email or web address] + . Click on \"Log in\" on the left side of the screen, which will take you to the Welcome page. Then click on \"Sign up Now\" on the right side of the page.     You will be asked to enter the access code listed below, as well as some personal information. Please follow the directions to create your username and password.     Your access code is: [email or web address]  Expires: 2018  3:28 PM     Your access code will  in 90 days. If you need help or a new code, please call your [facility] clinic or [phone].        Care EveryWhere ID     This is your Care EveryWhere ID. This could be used by other organizations to access your [facility] medical records  [id number]        Equal Access to Services     [facility] : Hadii magnus dumonto Sosruthi, waaxda luqadaha, qaybta kaalmada natasha, arpita portillo . So [facility] [phone].    ATENCIÓN: Si habla español, tiene a gabriel disposición servicios gratuitos de asistencia lingüística. Llame al [phone].    We comply with applicable federal civil rights laws and [place] laws. We do not discriminate on the basis of race, color, national origin, age, disability, sex, sexual orientation, or gender identity.               Review of your medicines      START taking        " Dose / Directions    erythromycin ophthalmic ointment   Commonly known as:  ROMYCIN   Used for:  Postoperative eye state        Apply to skin incisions three times daily and into the eye at bedtime.   Quantity:  3.5 g   Refills:  0       HYDROcodone-acetaminophen 5-325 MG per tablet   Commonly known as:  NORCO   Used for:  Postoperative eye state        Dose:  1 tablet   Take 1 tablet by mouth every 6 hours as needed for pain Maximum of 4000 mg of acetaminophen in 24 hours.   Quantity:  10 tablet   Refills:  0         CONTINUE these medicines which have NOT CHANGED        Dose / Directions    aspirin 81 MG tablet        1/2 tab daily   Quantity:  100   Refills:  3       glucosamine chondroitin 500 complex Caps        DAILY   Refills:  0       LUTEIN PO        Take by mouth daily   Refills:  0       NASONEX 50 MCG/ACT spray   Used for:  Other chronic rhinitis   Generic drug:  mometasone        USE TWO SPRAY(S) IN EACH NOSTRIL ONCE DAILY   Quantity:  17 g   Refills:  11       VITAMIN D PO        Take by mouth daily   Refills:  0            Where to get your medicines      These medications were sent to [facility] - [address]  [address] [facility] [address], [place] [address]     Phone:  [phone]     erythromycin ophthalmic ointment         Some of these will need a paper prescription and others can be bought over the counter. Ask your nurse if you have questions.     Bring a paper prescription for each of these medications     HYDROcodone-acetaminophen 5-325 MG per tablet                Protect others around you: Learn how to safely use, store and throw away your medicines at www.disposemymeds.org.        Information about OPIOIDS     PRESCRIPTION OPIOIDS: WHAT YOU NEED TO KNOW    Prescription opioids can be used to help relieve moderate to severe pain and are often prescribed following a surgery or injury, or for certain health conditions. These medications can be an important part  of treatment but also come with serious risks. It is important to work with your health care provider to make sure you are getting the safest, most effective care.    WHAT ARE THE RISKS AND SIDE EFFECTS OF OPIOID USE?  Prescription opioids carry serious risks of addiction and overdose, especially with prolonged use. An opioid overdose, often marked by slowed breathing can cause sudden death. The use of prescription opioids can have a number of side effects as well, even when taken as directed:      Tolerance - meaning you might need to take more of a medication for the same pain relief    Physical dependence - meaning you have symptoms of withdrawal when a medication is stopped    Increased sensitivity to pain    Constipation    Nausea, vomiting, and dry mouth    Sleepiness and dizziness    Confusion    Depression    Low levels of testosterone that can result in lower sex drive, energy, and strength    Itching and sweating    RISKS ARE GREATER WITH:    History of drug misuse, substance use disorder, or overdose    Mental health conditions (such as depression or anxiety)    Sleep apnea    Older age (65 years or older)    Pregnancy    Avoid alcohol while taking prescription opioids.   Also, unless specifically advised by your health care provider, medications to avoid include:    Benzodiazepines (such as Xanax or Valium)    Muscle relaxants (such as Soma or Flexeril)    Hypnotics (such as Ambien or Lunesta)    Other prescription opioids    KNOW YOUR OPTIONS:  Talk to your health care provider about ways to manage your pain that do not involve prescription opioids. Some of these options may actually work better and have fewer risks and side effects:    Pain relievers such as acetaminophen, ibuprofen, and naproxen    Some medications that are also used for depression or seizures    Physical therapy and exercise    Cognitive behavioral therapy, a psychological, goal-directed approach, in which patients learn how to  modify physical, behavioral, and emotional triggers of pain and stress    IF YOU ARE PRESCRIBED OPIOIDS FOR PAIN:    Never take opioids in greater amounts or more often than prescribed    Follow up with your primary health care provider and work together to create a plan on how to manage your pain.    Talk about ways to help manage your pain that do not involve prescription opioids    Talk about all concerns and side effects    Help prevent misuse and abuse    Never sell or share prescription opioids    Never use another person's prescription opioids    Store prescription opioids in a secure place and out of reach of others (this may include visitors, children, friends, and family)    Visit www.cdc.gov/drugoverdose to learn about risks of opioid abuse and overdose    If you believe you may be struggling with addiction, tell your health care provider and ask for guidance or call [facility]'s National Helpline at 3-351-303-HELP    LEARN MORE / www.cdc.gov/drugoverdose/prescribing/guideline.html    Safely dispose of unused prescription opioids: Find your local drug take-back programs and more information about the importance of safe disposal at www.doseofreality.[place].gov             Medication List: This is a list of all your medications and when to take them. Check marks below indicate your daily home schedule. Keep this list as a reference.      Medications           Morning Afternoon Evening Bedtime As Needed    aspirin 81 MG tablet   1/2 tab daily                                erythromycin ophthalmic ointment   Commonly known as:  ROMYCIN   Apply to skin incisions three times daily and into the eye at bedtime.   Last time this was given:  1 g on 4/12/2018  1:40 PM                                glucosamine chondroitin 500 complex Caps   DAILY                                HYDROcodone-acetaminophen 5-325 MG per tablet   Commonly known as:  NORCO   Take 1 tablet by mouth every 6 hours as needed for pain Maximum of 4000  mg of acetaminophen in 24 hours.                                LUTEIN PO   Take by mouth daily                                NASONEX 50 MCG/ACT spray   USE TWO SPRAY(S) IN EACH NOSTRIL ONCE DAILY   Generic drug:  mometasone                                VITAMIN D PO   Take by mouth daily

## 2018-04-12 NOTE — IP AVS SNAPSHOT
[facility]    [address]    [place] [address]    Phone:  [phone]    Fax:  [phone]                                       After Visit Summary   4/12/2018    Jeff Plasencia    MRN: [id number]           After Visit Summary Signature Page     I have received my discharge instructions, and my questions have been answered. I have discussed any challenges I see with this plan with the nurse or doctor.    ..........................................................................................................................................  Patient/Patient Representative Signature      ..........................................................................................................................................  Patient Representative Print Name and Relationship to Patient    ..................................................               ................................................  Date                                            Time    ..........................................................................................................................................  Reviewed by Signature/Title    ...................................................              ..............................................  Date                                                            Time

## 2018-04-12 NOTE — ANESTHESIA CARE TRANSFER NOTE
Patient: Jeff Plasencia    Procedure(s):  RIGHT LOWER LID ECTROPION REPAIR WITH SKIN GRAFT  - Wound Class: I-Clean    Diagnosis: RIGHT LOWER LID ECTROPION  Diagnosis Additional Information: No value filed.    Anesthesia Type:   MAC     Note:  Airway :Room Air  Patient transferred to:PACU  Comments: At end of procedure, spontaneous respirations, patient alert to voice, able to follow commands. Patient breathing room air at room air to PACU. Oxygen tubing connected to wall O2 in PACU, SpO2, NiBP, and EKG monitors and alarms on and functioning, Joaquina Hugger warmer connected to patient gown, report on patient's clinical status given to PACU RN, RN questions answered.Handoff Report: Identifed the Patient, Identified the Reponsible Provider, Reviewed the pertinent medical history, Discussed the surgical course, Reviewed Intra-OP anesthesia mangement and issues during anesthesia, Set expectations for post-procedure period and Allowed opportunity for questions and acknowledgement of understanding      Vitals: (Last set prior to Anesthesia Care Transfer)    CRNA VITALS  4/12/2018 1336 - 4/12/2018 1408      4/12/2018             Resp Rate (set): 10                Electronically Signed By: VENECIA David CRNA  April 12, 2018  2:08 PM

## 2018-04-25 ENCOUNTER — OFFICE VISIT (OUTPATIENT)
Dept: NEUROLOGY | Facility: CLINIC | Age: 83
End: 2018-04-25
Payer: MEDICARE

## 2018-04-25 VITALS
DIASTOLIC BLOOD PRESSURE: 80 MMHG | RESPIRATION RATE: 12 BRPM | SYSTOLIC BLOOD PRESSURE: 135 MMHG | TEMPERATURE: 98.1 F | HEART RATE: 79 BPM

## 2018-04-25 DIAGNOSIS — M48.02 SPINAL STENOSIS IN CERVICAL REGION: ICD-10-CM

## 2018-04-25 DIAGNOSIS — R26.89 BALANCE PROBLEMS: ICD-10-CM

## 2018-04-25 DIAGNOSIS — G25.0 ESSENTIAL TREMOR: Primary | ICD-10-CM

## 2018-04-25 PROCEDURE — 99215 OFFICE O/P EST HI 40 MIN: CPT | Performed by: PSYCHIATRY & NEUROLOGY

## 2018-04-25 ASSESSMENT — PAIN SCALES - GENERAL: PAINLEVEL: MILD PAIN (3)

## 2018-04-25 NOTE — LETTER
4/25/2018         RE: Jeff Plasencia  [address] [facility] [address]        Dear Colleague,    Thank you for referring your patient, Jeff Plasencia, to the [facility]. Please see a copy of my visit note below.    ESTABLISHED PATIENT NEUROLOGY NOTE    DATE OF VISIT: 4/25/2018  MRN: [id number]  PATIENT NAME: Jeff Plasencia  YOB: 1923    Chief Complaint   Patient presents with     RECHECK     Headache and tremor.     SUBJECTIVE:                                                      HISTORY OF PRESENT ILLNESS:  Jeff is here for follow up regarding headaches and tremor. When I met with the patient about 11 months ago, he was still having problems with headaches. He has history of difficulty tolerating medications. He was on propranolol in the past and we tried Topamax but his balance was worse. He mainly wanted to discuss neck pain at the previous visit. I ordered cervical spine imaging. This showed arthritic fusion and C5-C7 levels and mild-moderate canal stenosis at C4-C5. He has additional balance problems which improved some with physical therapy. I did refer the patient to pain clinic for ONB, but I do not see an encounter to indicate this was actually completed. Tremor has been tolerable, so we have not been treating this. Brain MRI from 5.2017 was unremarkable.     The patient says that he is mainly here to discuss the neck pain and some noises he hears when turning his head. He tries to demonstrate this for me. He cannot say why he did not do the injections. He mentions he did not have good experience with injections in the knee, so that is part of the reason.      He says the headaches are tolerable. He mentions that when he did the physical therapy for balance it made his knees worse. Their notes indicate he did not return so he was discharged. He says that some days his balance is good, some days it is bad. He has lightheadedness when he first stands up and has  to wait a minute before moving.     He says he does not feel that the neck pain is tolerable. He says he has not had any falls. He does best with his shoes on, with orthotics in place.     The patient's son is here with him and mentions that the Norco on Jeff's medication was given to him after a recent eye surgery. He does not take this.     CURRENT MEDICATIONS:     Current Outpatient Prescriptions on File Prior to Visit:  ASPIRIN 81 MG OR TABS 1/2 tab daily   GLUCOSAMINE CHONDR 500 COMPLEX OR CAPS DAILY   HYDROcodone-acetaminophen (NORCO) 5-325 MG per tablet Take 1 tablet by mouth every 6 hours as needed for pain Maximum of 4000 mg of acetaminophen in 24 hours.   LUTEIN PO Take by mouth daily    NASONEX 50 MCG/ACT spray USE TWO SPRAY(S) IN EACH NOSTRIL ONCE DAILY (Patient taking differently: USE TWO SPRAY(S) IN EACH NOSTRIL ONCE DAILY, as needed)   VITAMIN D PO Take by mouth daily      No current facility-administered medications on file prior to visit.     RECENT DIAGNOSTIC STUDIES:   Labs:   Results for orders placed or performed in visit on 03/01/18   KOH prep (skin, hair or nails only)   Result Value Ref Range    Specimen Description Rash Skin     KOH Skin Hair Nails Test No fungal elements seen        Imaging:   Cervical Spine MRI (5.31.17):  IMPRESSION:  1. Arthritic fusion of C5-C6 and C6-C7.  2. Multilevel degenerative disc and facet disease most advanced at  C4-C5 where there is mild-to-moderate central canal stenosis, moderate  bilateral neural foraminal stenosis and mild cord deformity.    Imaging reviewed independently by me. Agree with radiology read.     REVIEW OF SYSTEMS:                                                      10-point review of systems is negative except as mentioned above in HPI.     EXAM:                                                      Physical Exam:   Vitals: /80 (BP Location: Right arm, Patient Position: Sitting, Cuff Size: Adult Regular)  Pulse 79  Temp 98.1  F  (36.7  C) (Oral)  Resp 12  BMI= There is no height or weight on file to calculate BMI.  GENERAL: NAD.  Neurologic:  MENTAL STATUS: Alert, attentive. Speech is fluent, slow and deliberate. Tangential at times. Normal comprehension. Fair concentration. Adequate fund of knowledge.   CRANIAL NERVES: Discs difficult to fully visualize. Abnormality in Right iris. Visual fields intact to confrontation. Pupils equally, round and reactive to light. Facial sensation and movement normal. EOM full. Hearing intact to conversation. Trapezius strength intact. Palate moves symmetrically. Tongue midline.  MOTOR: Strength is 5/5 in proximal and distal muscle groups of upper and lower extremities. Tone and bulk normal.   DTRs: 1+ in UEs. Brisk at patellae. Trace/absent ankle jerks..  Babinski down-going bilaterally.   SENSATION: Normal light touch and pinprick. Intact proprioception. Vibration: Decreased at both ankles.   COORDINATION: Terminal intention tremor, otherwise normal finger nose finger. Finger tapping normal.   STATION AND GAIT: Romberg positive. Gait is cautious and slightly wide.   CV: RRR. S1, S2.   NECK: No bruits.  Postural and action tremor in the hands bilaterally.    ASSESSMENT and PLAN:                                                      Assessment and Plan:    ICD-10-CM    1. Essential tremor G25.0    2. Spinal stenosis in cervical region M48.02    3. Balance problems R26.89        Mr. Plasencia is a pleasant 89 yo man with neck pain, tremor and balance problems. We discussed the cervical spine imaging results. I again recommend injections for the pain and stiffness, but he is not interested in this for now. I also recommend he return to physical therapy since he was lost to follow-up last spring (per their notes). He does not want to do this either and insists he is functioning well, without falls, at home. He continues to feel that the tremor is also tolerable. It is most consistent with ET, and other than the  gait imbalance (likely multifactorial), I do not see any signs of Parkinsonism on exam. Will continue to monitor. Follow-up in 6 months.     Patient Instructions:  Follow-up in 6 months.   Please let me know if you change your mind about my recommendations:  -- Referral to orthopedics or pain clinic for neck/injections.  -- Referral back to physical therapy for balance.    Total Time: 40 minutes were spent with the patient. More than 50% of the time spent on counseling (as described above in Assessment and Plan) /coordinating the care.    Breanne Garcias MD  Neurology                    Again, thank you for allowing me to participate in the care of your patient.        Sincerely,        Breanne Garcias MD

## 2018-04-25 NOTE — PROGRESS NOTES
ESTABLISHED PATIENT NEUROLOGY NOTE    DATE OF VISIT: 4/25/2018  MRN: [id number]  PATIENT NAME: Jeff Plasencia  YOB: 1923    Chief Complaint   Patient presents with     RECHECK     Headache and tremor.     SUBJECTIVE:                                                      HISTORY OF PRESENT ILLNESS:  Jeff is here for follow up regarding headaches and tremor. When I met with the patient about 11 months ago, he was still having problems with headaches. He has history of difficulty tolerating medications. He was on propranolol in the past and we tried Topamax but his balance was worse. He mainly wanted to discuss neck pain at the previous visit. I ordered cervical spine imaging. This showed arthritic fusion and C5-C7 levels and mild-moderate canal stenosis at C4-C5. He has additional balance problems which improved some with physical therapy. I did refer the patient to pain clinic for ONB, but I do not see an encounter to indicate this was actually completed. Tremor has been tolerable, so we have not been treating this. Brain MRI from 5.2017 was unremarkable.     The patient says that he is mainly here to discuss the neck pain and some noises he hears when turning his head. He tries to demonstrate this for me. He cannot say why he did not do the injections. He mentions he did not have good experience with injections in the knee, so that is part of the reason.      He says the headaches are tolerable. He mentions that when he did the physical therapy for balance it made his knees worse. Their notes indicate he did not return so he was discharged. He says that some days his balance is good, some days it is bad. He has lightheadedness when he first stands up and has to wait a minute before moving.     He says he does not feel that the neck pain is tolerable. He says he has not had any falls. He does best with his shoes on, with orthotics in place.     The patient's son is here with him and mentions that  the Norco on Jeff's medication was given to him after a recent eye surgery. He does not take this.     CURRENT MEDICATIONS:     Current Outpatient Prescriptions on File Prior to Visit:  ASPIRIN 81 MG OR TABS 1/2 tab daily   GLUCOSAMINE CHONDR 500 COMPLEX OR CAPS DAILY   HYDROcodone-acetaminophen (NORCO) 5-325 MG per tablet Take 1 tablet by mouth every 6 hours as needed for pain Maximum of 4000 mg of acetaminophen in 24 hours.   LUTEIN PO Take by mouth daily    NASONEX 50 MCG/ACT spray USE TWO SPRAY(S) IN EACH NOSTRIL ONCE DAILY (Patient taking differently: USE TWO SPRAY(S) IN EACH NOSTRIL ONCE DAILY, as needed)   VITAMIN D PO Take by mouth daily      No current facility-administered medications on file prior to visit.     RECENT DIAGNOSTIC STUDIES:   Labs:   Results for orders placed or performed in visit on 03/01/18   KOH prep (skin, hair or nails only)   Result Value Ref Range    Specimen Description Rash Skin     KOH Skin Hair Nails Test No fungal elements seen        Imaging:   Cervical Spine MRI (5.31.17):  IMPRESSION:  1. Arthritic fusion of C5-C6 and C6-C7.  2. Multilevel degenerative disc and facet disease most advanced at  C4-C5 where there is mild-to-moderate central canal stenosis, moderate  bilateral neural foraminal stenosis and mild cord deformity.    Imaging reviewed independently by me. Agree with radiology read.     REVIEW OF SYSTEMS:                                                      10-point review of systems is negative except as mentioned above in HPI.     EXAM:                                                      Physical Exam:   Vitals: /80 (BP Location: Right arm, Patient Position: Sitting, Cuff Size: Adult Regular)  Pulse 79  Temp 98.1  F (36.7  C) (Oral)  Resp 12  BMI= There is no height or weight on file to calculate BMI.  GENERAL: NAD.  Neurologic:  MENTAL STATUS: Alert, attentive. Speech is fluent, slow and deliberate. Tangential at times. Normal comprehension. Fair  concentration. Adequate fund of knowledge.   CRANIAL NERVES: Discs difficult to fully visualize. Abnormality in Right iris. Visual fields intact to confrontation. Pupils equally, round and reactive to light. Facial sensation and movement normal. EOM full. Hearing intact to conversation. Trapezius strength intact. Palate moves symmetrically. Tongue midline.  MOTOR: Strength is 5/5 in proximal and distal muscle groups of upper and lower extremities. Tone and bulk normal.   DTRs: 1+ in UEs. Brisk at patellae. Trace/absent ankle jerks..  Babinski down-going bilaterally.   SENSATION: Normal light touch and pinprick. Intact proprioception. Vibration: Decreased at both ankles.   COORDINATION: Terminal intention tremor, otherwise normal finger nose finger. Finger tapping normal.   STATION AND GAIT: Romberg positive. Gait is cautious and slightly wide.   CV: RRR. S1, S2.   NECK: No bruits.  Postural and action tremor in the hands bilaterally.    ASSESSMENT and PLAN:                                                      Assessment and Plan:    ICD-10-CM    1. Essential tremor G25.0    2. Spinal stenosis in cervical region M48.02    3. Balance problems R26.89        Mr. Plasencia is a pleasant 89 yo man with neck pain, tremor and balance problems. We discussed the cervical spine imaging results. I again recommend injections for the pain and stiffness, but he is not interested in this for now. I also recommend he return to physical therapy since he was lost to follow-up last spring (per their notes). He does not want to do this either and insists he is functioning well, without falls, at home. He continues to feel that the tremor is also tolerable. It is most consistent with ET, and other than the gait imbalance (likely multifactorial), I do not see any signs of Parkinsonism on exam. Will continue to monitor. Follow-up in 6 months.     Patient Instructions:  Follow-up in 6 months.   Please let me know if you change your mind about  my recommendations:  -- Referral to orthopedics or pain clinic for neck/injections.  -- Referral back to physical therapy for balance.    Total Time: 40 minutes were spent with the patient. More than 50% of the time spent on counseling (as described above in Assessment and Plan) /coordinating the care.    Breanne Garcias MD  Neurology

## 2018-04-25 NOTE — MR AVS SNAPSHOT
"              After Visit Summary   4/25/2018    Jeff Plasencia    MRN: [id number]           Patient Information     Date Of Birth          5/20/1923        Visit Information        Provider Department      4/25/2018 10:15 AM Breanne Garcias MD [facility]        Care Instructions    Plan:    Follow-up in 6 months.   Please let me know if you change your mind about my recommendations:  -- Referral to orthopedics or pain clinic for neck/injections.  -- Referral back to physical therapy for balance.            Follow-ups after your visit        Follow-up notes from your care team     Return in about 6 months (around 10/25/2018).      Who to contact     If you have questions or need follow up information about today's clinic visit or your schedule please contact [facility] directly at [phone].  Normal or non-critical lab and imaging results will be communicated to you by MyChart, letter or phone within 4 business days after the clinic has received the results. If you do not hear from us within 7 days, please contact the clinic through MyChart or phone. If you have a critical or abnormal lab result, we will notify you by phone as soon as possible.  Submit refill requests through [employer] or call your pharmacy and they will forward the refill request to us. Please allow 3 business days for your refill to be completed.          Additional Information About Your Visit        MyChart Information     [employer] lets you send messages to your doctor, view your test results, renew your prescriptions, schedule appointments and more. To sign up, go to [email or web address] . Click on \"Log in\" on the left side of the screen, which will take you to the Welcome page. Then click on \"Sign up Now\" on the right side of the page.     You will be asked to enter the access code listed below, as well as some personal information. Please follow the directions to create your username and password.     Your " access code is: [email or web address]  Expires: 2018 11:01 AM     Your access code will  in 90 days. If you need help or a new code, please call your [place] clinic or [phone].        Care EveryWhere ID     This is your Care EveryWhere ID. This could be used by other organizations to access your [place] medical records  [id number]        Your Vitals Were     Pulse Temperature Respirations             79 98.1  F (36.7  C) (Oral) 12          Blood Pressure from Last 3 Encounters:   18 135/80   18 (!) 160/92   18 136/76    Weight from Last 3 Encounters:   18 88.9 kg (196 lb)   18 90.7 kg (200 lb)   18 90.7 kg (200 lb)              Today, you had the following     No orders found for display         Today's Medication Changes          These changes are accurate as of 18 11:01 AM.  If you have any questions, ask your nurse or doctor.               These medicines have changed or have updated prescriptions.        Dose/Directions    NASONEX 50 MCG/ACT spray   This may have changed:  See the new instructions.   Used for:  Other chronic rhinitis   Generic drug:  mometasone        USE TWO SPRAY(S) IN EACH NOSTRIL ONCE DAILY   Quantity:  17 g   Refills:  11                Primary Care Provider Office Phone # Fax #    Lorenza Milena CARMENZA Smith [phone]       [address] [id number]        Equal Access to Services     [facility]: Hadii magnus dumonto Sosruthi, waaxda luqadaha, qaybta kaalmada natasha, waxsunny melissa portillo . So [facility] [phone].    ATENCIÓN: Si habla español, tiene a gabriel disposición servicios gratuitos de asistencia lingüística. Llame al [phone].    We comply with applicable federal civil rights laws and [place] laws. We do not discriminate on the basis of race, color, national origin, age, disability, sex, sexual orientation, or gender identity.            Thank you!     Thank you for choosing  [facility]  for your care. Our goal is always to provide you with excellent care. Hearing back from our patients is one way we can continue to improve our services. Please take a few minutes to complete the written survey that you may receive in the mail after your visit with us. Thank you!             Your Updated Medication List - Protect others around you: Learn how to safely use, store and throw away your medicines at www.disposemymeds.org.          This list is accurate as of 4/25/18 11:01 AM.  Always use your most recent med list.                   Brand Name Dispense Instructions for use Diagnosis    ARTIFICIAL TEAR OP      Apply to eye 2 times daily        aspirin 81 MG tablet     100    1/2 tab daily        glucosamine chondroitin 500 complex Caps      DAILY        HYDROcodone-acetaminophen 5-325 MG per tablet    NORCO    10 tablet    Take 1 tablet by mouth every 6 hours as needed for pain Maximum of 4000 mg of acetaminophen in 24 hours.    Postoperative eye state       LUTEIN PO      Take by mouth daily        NASONEX 50 MCG/ACT spray   Generic drug:  mometasone     17 g    USE TWO SPRAY(S) IN EACH NOSTRIL ONCE DAILY    Other chronic rhinitis       VITAMIN D PO      Take by mouth daily

## 2018-04-25 NOTE — NURSING NOTE
"Chief Complaint   Patient presents with     RECHECK     Headache and tremor.       Initial /80 (BP Location: Right arm, Patient Position: Sitting, Cuff Size: Adult Regular)  Pulse 79  Temp 98.1  F (36.7  C) (Oral)  Resp 12 Estimated body mass index is 27.34 kg/(m^2) as calculated from the following:    Height as of 4/12/18: 1.803 m (5' 11\").    Weight as of 4/12/18: 88.9 kg (196 lb).  Medication Reconciliation: complete    Patient prefers to be contacted: letter  Okay to leave detailed message on voicemail: bisi Fritz NR-CMA    "

## 2018-05-22 ENCOUNTER — OFFICE VISIT (OUTPATIENT)
Dept: FAMILY MEDICINE | Facility: CLINIC | Age: 83
End: 2018-05-22
Payer: MEDICARE

## 2018-05-22 VITALS
HEART RATE: 88 BPM | DIASTOLIC BLOOD PRESSURE: 54 MMHG | RESPIRATION RATE: 18 BRPM | WEIGHT: 191 LBS | TEMPERATURE: 97.8 F | SYSTOLIC BLOOD PRESSURE: 98 MMHG | HEIGHT: 71 IN | BODY MASS INDEX: 26.74 KG/M2

## 2018-05-22 DIAGNOSIS — L01.00 IMPETIGO: Primary | ICD-10-CM

## 2018-05-22 PROCEDURE — 99213 OFFICE O/P EST LOW 20 MIN: CPT | Performed by: FAMILY MEDICINE

## 2018-05-22 RX ORDER — MUPIROCIN 20 MG/G
OINTMENT TOPICAL 3 TIMES DAILY
Qty: 22 G | Refills: 0 | Status: SHIPPED | OUTPATIENT
Start: 2018-05-22 | End: 2018-05-29

## 2018-05-22 NOTE — PROGRESS NOTES
SUBJECTIVE:   Jeff Plasencia is a 89 year old male who presents to clinic today for the following health issues:      Blister      Duration: 5 days    Description (location/character/radiation): bottom of lip- started in the corner of his mouth- now in the middle. Lip area has been a little swollen.     Intensity:  mild    Accompanying signs and symptoms: Now has a scab on it.     History (similar episodes/previous evaluation): None    Precipitating or alleviating factors: None    Therapies tried and outcome: a&d Ointment          Problem list and histories reviewed & adjusted, as indicated.  Additional history: as documented    Patient Active Problem List   Diagnosis     CANCER   PROSTATE     Dermatophytosis of body     Hx of supraventricular tachycardia     Hyperlipidemia LDL goal <130     Advanced directives, counseling/discussion     Memory loss     Radiculopathy of leg     At high risk for falls     Tremor     Other chronic rhinitis     Past Surgical History:   Procedure Laterality Date     COLONOSCOPY  9/28/2011    Procedure:COLONOSCOPY; Colonoscopy  ; Surgeon:LAMAR ALLEN; Location:[facility]     EYE SURGERY       GENITOURINARY SURGERY       HERNIA REPAIR       REPAIR ECTROPION Right 4/12/2018    Procedure: REPAIR ECTROPION;  RIGHT LOWER LID ECTROPION REPAIR WITH SKIN GRAFT ;  Surgeon: Garry Ortiz MD;  Location: [facility]     SURGICAL HISTORY OF -   11/1999    Right cataract surgery     SURGICAL HISTORY OF -   12/1999    Hernia repair     SURGICAL HISTORY OF -   2001    Normal Colonoscopy     SURGICAL HISTORY OF -   8/2002    Cryosurgery of the prostate     SURGICAL HISTORY OF -   4/2003    Left cataract surgery       Social History   Substance Use Topics     Smoking status: Never Smoker     Smokeless tobacco: Never Used     Alcohol use Yes      Comment: socially     Family History   Problem Relation Age of Onset     Hypertension Brother      DIABETES Mother      age 70s     CEREBROVASCULAR DISEASE  Sister      Cancer - colorectal No family hx of          Current Outpatient Prescriptions   Medication Sig Dispense Refill     ARTIFICIAL TEAR OP Apply to eye 2 times daily       ASPIRIN 81 MG OR TABS 1/2 tab daily 100 3     GLUCOSAMINE CHONDR 500 COMPLEX OR CAPS DAILY  0     HYDROcodone-acetaminophen (NORCO) 5-325 MG per tablet Take 1 tablet by mouth every 6 hours as needed for pain Maximum of 4000 mg of acetaminophen in 24 hours. 10 tablet 0     LUTEIN PO Take by mouth daily        NASONEX 50 MCG/ACT spray USE TWO SPRAY(S) IN EACH NOSTRIL ONCE DAILY (Patient taking differently: USE TWO SPRAY(S) IN EACH NOSTRIL ONCE DAILY, as needed) 17 g 11     VITAMIN D PO Take by mouth daily        Allergies   Allergen Reactions     Mustard Oil Anaphylaxis     Penicillins Anaphylaxis     Atenolol Rash     Cozaar [Losartan Potassium] Hives     Inderal [Propranolol Hcl]      Nightmares, headache, chestpain     Lisinopril Cough     Recent Labs   Lab Test  05/01/17   1102  04/26/17   0850  03/24/17   1008  11/03/16   0835  01/20/16   1115 08/21/15  08/11/14   05/06/11   1253   A1C   --    --    --    --    --    --    --    --    --   5.5   LDL   --    --    --   107*   --   121   --   118   < >   --    HDL   --    --    --   59   --   43   --   43   < >   --    TRIG   --    --    --   46   --   96   --   86   < >   --    ALT   --    --   17   --   28  27   < >  32   < >   --    CR   --    --   1.03   --   1.17  0.9   < >  0.9   < >   --    GFRESTIMATED  70   --   67   --   58*  >60   < >   --    < >   --    GFRESTBLACK  84   --   81   --   70   --    < >   --    < >   --    POTASSIUM   --    --   4.1   --   4.2  4.6   < >  4.4   < >   --    TSH   --   3.21   --    --   2.03   --    --    --    < >  2.15    < > = values in this interval not displayed.      BP Readings from Last 3 Encounters:   05/22/18 98/54   04/25/18 135/80   04/12/18 (!) 160/92    Wt Readings from Last 3 Encounters:   05/22/18 191 lb (86.6 kg)   04/12/18 196 lb  "(88.9 kg)   04/09/18 200 lb (90.7 kg)                  Labs reviewed in EPIC    Reviewed and updated as needed this visit by clinical staff       Reviewed and updated as needed this visit by Provider         ROS:  Constitutional, HEENT, cardiovascular, pulmonary, gi and gu systems are negative, except as otherwise noted.    OBJECTIVE:     BP 98/54 (Cuff Size: Adult Regular)  Pulse 88  Temp 97.8  F (36.6  C) (Tympanic)  Resp 18  Ht 5' 11\" (1.803 m)  Wt 191 lb (86.6 kg)  BMI 26.64 kg/m2  Body mass index is 26.64 kg/(m^2).  GENERAL: alert, no distress, frail and elderly  EYES: Eyes grossly normal to inspection, PERRL and conjunctivae and sclerae normal  HENT/SKIN: normal cephalic/atraumatic, erythematous rash with newman colored crust just beneath lower lip as shown below  NECK: no adenopathy, no asymmetry, masses, or scars and thyroid normal to palpation                ASSESSMENT/PLAN:         ICD-10-CM    1. Impetigo L01.00 mupirocin (BACTROBAN) 2 % ointment       Lesion likely secondary to impetigo.  Mupirocin prescribed, common side effect discussed.  Follow-up if lesion persist or worsen.  All questions answered.      Jostin Carlson MD  [facility]  "

## 2018-05-22 NOTE — MR AVS SNAPSHOT
"              After Visit Summary   2018    Jeff Plasencia    MRN: [id number]           Patient Information     Date Of Birth          1923        Visit Information        Provider Department      2018 3:40 PM Jostin Carlson MD [facility]        Today's Diagnoses     Impetigo    -  1       Follow-ups after your visit        Who to contact     If you have questions or need follow up information about today's clinic visit or your schedule please contact [facility] directly at [phone].  Normal or non-critical lab and imaging results will be communicated to you by E-Blinkhart, letter or phone within 4 business days after the clinic has received the results. If you do not hear from us within 7 days, please contact the clinic through E-Blinkhart or phone. If you have a critical or abnormal lab result, we will notify you by phone as soon as possible.  Submit refill requests through GetAFive or call your pharmacy and they will forward the refill request to us. Please allow 3 business days for your refill to be completed.          Additional Information About Your Visit        MyChart Information     GetAFive lets you send messages to your doctor, view your test results, renew your prescriptions, schedule appointments and more. To sign up, go to [email or web address] . Click on \"Log in\" on the left side of the screen, which will take you to the Welcome page. Then click on \"Sign up Now\" on the right side of the page.     You will be asked to enter the access code listed below, as well as some personal information. Please follow the directions to create your username and password.     Your access code is: [email or web address]  Expires: 2018 11:01 AM     Your access code will  in 90 days. If you need help or a new code, please call your [facility] or [phone].        Care EveryWhere ID     This is your Care EveryWhere ID. This could be used by other organizations to " "access your [facility] medical records  [id number]        Your Vitals Were     Pulse Temperature Respirations Height BMI (Body Mass Index)       88 97.8  F (36.6  C) (Tympanic) 18 5' 11\" (1.803 m) 26.64 kg/m2        Blood Pressure from Last 3 Encounters:   05/22/18 98/54   04/25/18 135/80   04/12/18 (!) 160/92    Weight from Last 3 Encounters:   05/22/18 191 lb (86.6 kg)   04/12/18 196 lb (88.9 kg)   04/09/18 200 lb (90.7 kg)              Today, you had the following     No orders found for display         Today's Medication Changes          These changes are accurate as of 5/22/18  3:53 PM.  If you have any questions, ask your nurse or doctor.               Start taking these medicines.        Dose/Directions    mupirocin 2 % ointment   Commonly known as:  BACTROBAN   Used for:  Impetigo   Started by:  Jostin Carlson MD        Apply topically 3 times daily for 7 days   Quantity:  22 g   Refills:  0         These medicines have changed or have updated prescriptions.        Dose/Directions    NASONEX 50 MCG/ACT spray   This may have changed:  See the new instructions.   Used for:  Other chronic rhinitis   Generic drug:  mometasone        USE TWO SPRAY(S) IN EACH NOSTRIL ONCE DAILY   Quantity:  17 g   Refills:  11            Where to get your medicines      These medications were sent to [facility] [address] [place] [address] [address]  [address] [id number]     Phone:  [phone]     mupirocin 2 % ointment                Primary Care Provider Office Phone # Fax #    Jostin Carlson [phone]       [address] [facility] [address]        Equal Access to Services     JOHAN BECK AH: Lainey Gay, wamarianne qureshi, pete kaalmada yaelda, arpita brothers. So [facility] [phone].    ATENCIÓN: Si habla español, tiene a gabriel disposición servicios gratuitos de asistencia lingüística. Llame al [phone].    We comply with " applicable federal civil rights laws and [place] laws. We do not discriminate on the basis of race, color, national origin, age, disability, sex, sexual orientation, or gender identity.            Thank you!     Thank you for choosing [facility]  for your care. Our goal is always to provide you with excellent care. Hearing back from our patients is one way we can continue to improve our services. Please take a few minutes to complete the written survey that you may receive in the mail after your visit with us. Thank you!             Your Updated Medication List - Protect others around you: Learn how to safely use, store and throw away your medicines at www.disposemymeds.org.          This list is accurate as of 5/22/18  3:53 PM.  Always use your most recent med list.                   Brand Name Dispense Instructions for use Diagnosis    ARTIFICIAL TEAR OP      Apply to eye 2 times daily        aspirin 81 MG tablet     100    1/2 tab daily        glucosamine chondroitin 500 complex Caps      DAILY        HYDROcodone-acetaminophen 5-325 MG per tablet    NORCO    10 tablet    Take 1 tablet by mouth every 6 hours as needed for pain Maximum of 4000 mg of acetaminophen in 24 hours.    Postoperative eye state       LUTEIN PO      Take by mouth daily        mupirocin 2 % ointment    BACTROBAN    22 g    Apply topically 3 times daily for 7 days    Impetigo       NASONEX 50 MCG/ACT spray   Generic drug:  mometasone     17 g    USE TWO SPRAY(S) IN EACH NOSTRIL ONCE DAILY    Other chronic rhinitis       VITAMIN D PO      Take by mouth daily

## 2018-06-08 ENCOUNTER — TELEPHONE (OUTPATIENT)
Dept: FAMILY MEDICINE | Facility: CLINIC | Age: 83
End: 2018-06-08

## 2018-06-08 NOTE — TELEPHONE ENCOUNTER
"Pt reports BP about 1 hr ago 86/45. Was feeling a little lightheaded, has subsided.  Checked BP 3x Wed with variable readings- 146/86, 105/56, 116/?.  States has been eating, drinking fluids. \"Up many time to B/R.\"  Had pt recheck BP now 143/57. Denies lightheadedness, dizziness, weakness.   BP Readings from Last 6 Encounters:   05/22/18 98/54   04/25/18 135/80   04/12/18 (!) 160/92   04/09/18 136/76   04/06/18 136/76   03/01/18 130/78      Advised if symptmatic- persistent lightheadedness, dizziness, weakness, vision changes or any other abnormal sx needs to be seen in ED, to have  or 911.  RN will discuss with Dr. Carlson Monday when returns to clinic.  CORTEZ Ricardo RN    "

## 2018-06-08 NOTE — TELEPHONE ENCOUNTER
Needs an appointment for further evaluation.       Jostin Carlson MD  [facility]

## 2018-06-08 NOTE — TELEPHONE ENCOUNTER
Reason for Call:  Other call back    Detailed comments: Pt is concerned his b/p today is 86/45. Please call.    Phone Number Patient can be reached at: Home number on file [phone] (home)    Best Time: any    Can we leave a detailed message on this number?     Call taken on 6/8/2018 at 10:28 AM by Zulema Gómez

## 2018-06-11 NOTE — TELEPHONE ENCOUNTER
Pt informed, states he will call back to make appt in next 1-2 days.  Advised to bring BP readings and monitor with to appt.  CORTEZ Ricardo RN

## 2018-06-12 ENCOUNTER — OFFICE VISIT (OUTPATIENT)
Dept: FAMILY MEDICINE | Facility: CLINIC | Age: 83
End: 2018-06-12
Payer: MEDICARE

## 2018-06-12 VITALS
OXYGEN SATURATION: 98 % | DIASTOLIC BLOOD PRESSURE: 52 MMHG | TEMPERATURE: 97.8 F | SYSTOLIC BLOOD PRESSURE: 100 MMHG | WEIGHT: 185 LBS | HEIGHT: 71 IN | HEART RATE: 88 BPM | BODY MASS INDEX: 25.9 KG/M2 | RESPIRATION RATE: 18 BRPM

## 2018-06-12 DIAGNOSIS — R09.89 LABILE BLOOD PRESSURE: Primary | ICD-10-CM

## 2018-06-12 LAB
ALBUMIN SERPL-MCNC: 3.3 G/DL (ref 3.4–5)
ALP SERPL-CCNC: 68 U/L (ref 40–150)
ALT SERPL W P-5'-P-CCNC: 21 U/L (ref 0–70)
ANION GAP SERPL CALCULATED.3IONS-SCNC: 6 MMOL/L (ref 3–14)
AST SERPL W P-5'-P-CCNC: 24 U/L (ref 0–45)
BILIRUB SERPL-MCNC: 0.4 MG/DL (ref 0.2–1.3)
BUN SERPL-MCNC: 26 MG/DL (ref 7–30)
CALCIUM SERPL-MCNC: 8.2 MG/DL (ref 8.5–10.1)
CHLORIDE SERPL-SCNC: 105 MMOL/L (ref 94–109)
CO2 SERPL-SCNC: 29 MMOL/L (ref 20–32)
CREAT SERPL-MCNC: 1 MG/DL (ref 0.66–1.25)
ERYTHROCYTE [DISTWIDTH] IN BLOOD BY AUTOMATED COUNT: 13.6 % (ref 10–15)
GFR SERPL CREATININE-BSD FRML MDRD: 69 ML/MIN/1.7M2
GLUCOSE SERPL-MCNC: 158 MG/DL (ref 70–99)
HCT VFR BLD AUTO: 42.9 % (ref 40–53)
HGB BLD-MCNC: 13.8 G/DL (ref 13.3–17.7)
MCH RBC QN AUTO: 32.6 PG (ref 26.5–33)
MCHC RBC AUTO-ENTMCNC: 32.2 G/DL (ref 31.5–36.5)
MCV RBC AUTO: 101 FL (ref 78–100)
PLATELET # BLD AUTO: 184 10E9/L (ref 150–450)
POTASSIUM SERPL-SCNC: 4.2 MMOL/L (ref 3.4–5.3)
PROT SERPL-MCNC: 6.9 G/DL (ref 6.8–8.8)
RBC # BLD AUTO: 4.23 10E12/L (ref 4.4–5.9)
SODIUM SERPL-SCNC: 140 MMOL/L (ref 133–144)
WBC # BLD AUTO: 5.5 10E9/L (ref 4–11)

## 2018-06-12 PROCEDURE — 83036 HEMOGLOBIN GLYCOSYLATED A1C: CPT | Performed by: FAMILY MEDICINE

## 2018-06-12 PROCEDURE — 80053 COMPREHEN METABOLIC PANEL: CPT | Performed by: FAMILY MEDICINE

## 2018-06-12 PROCEDURE — 36415 COLL VENOUS BLD VENIPUNCTURE: CPT | Performed by: FAMILY MEDICINE

## 2018-06-12 PROCEDURE — 99213 OFFICE O/P EST LOW 20 MIN: CPT | Performed by: FAMILY MEDICINE

## 2018-06-12 PROCEDURE — 85027 COMPLETE CBC AUTOMATED: CPT | Performed by: FAMILY MEDICINE

## 2018-06-12 PROCEDURE — 82533 TOTAL CORTISOL: CPT | Performed by: FAMILY MEDICINE

## 2018-06-12 PROCEDURE — 93000 ELECTROCARDIOGRAM COMPLETE: CPT | Performed by: FAMILY MEDICINE

## 2018-06-12 RX ORDER — ERYTHROMYCIN 5 MG/G
OINTMENT OPHTHALMIC
COMMUNITY
Start: 2018-05-08 | End: 2018-01-01

## 2018-06-12 NOTE — MR AVS SNAPSHOT
"              After Visit Summary   6/12/2018    Jeff Plasencia    MRN: [id number]           Patient Information     Date Of Birth          5/20/1923        Visit Information        Provider Department      6/12/2018 8:40 AM Jostin Carlson MD [facility]        Today's Diagnoses     Labile blood pressure    -  1       Follow-ups after your visit        Who to contact     If you have questions or need follow up information about today's clinic visit or your schedule please contact [facility] directly at [phone].  Normal or non-critical lab and imaging results will be communicated to you by MyChart, letter or phone within 4 business days after the clinic has received the results. If you do not hear from us within 7 days, please contact the clinic through MyChart or phone. If you have a critical or abnormal lab result, we will notify you by phone as soon as possible.  Submit refill requests through Icinetic or call your pharmacy and they will forward the refill request to us. Please allow 3 business days for your refill to be completed.          Additional Information About Your Visit        Care EveryWhere ID     This is your Care EveryWhere ID. This could be used by other organizations to access your [place] medical records  [id number]        Your Vitals Were     Pulse Temperature Respirations Height Pulse Oximetry BMI (Body Mass Index)    88 97.8  F (36.6  C) (Tympanic) 18 5' 11\" (1.803 m) 98% 25.8 kg/m2       Blood Pressure from Last 3 Encounters:   06/12/18 100/52   05/22/18 98/54   04/25/18 135/80    Weight from Last 3 Encounters:   06/12/18 185 lb (83.9 kg)   05/22/18 191 lb (86.6 kg)   04/12/18 196 lb (88.9 kg)              We Performed the Following     CBC with platelets     Comprehensive metabolic panel (BMP + Alb, Alk Phos, ALT, AST, Total. Bili, TP)     Cortisol     EKG 12-lead complete w/read - Clinics          Today's Medication Changes          These changes " are accurate as of 6/12/18  9:08 AM.  If you have any questions, ask your nurse or doctor.               Stop taking these medicines if you haven't already. Please contact your care team if you have questions.     HYDROcodone-acetaminophen 5-325 MG per tablet   Commonly known as:  NORCO   Stopped by:  Jostin Carlson MD                    Primary Care Provider Office Phone # Fax #    Jostin Carlson [phone]       [address] [facility] [address]        Equal Access to Services     [facility] Hospital[facility] : Hadii aad ku hadasho Soomaali, waaxda luqadaha, qaybta kaalmada adeegyada, waxay yiin haypeter portillo . So [facility] [phone].    ATENCIÓN: Si habla español, tiene a gabriel disposición servicios gratuitos de asistencia lingüística. Llame al [phone].    We comply with applicable federal civil rights laws and [place] laws. We do not discriminate on the basis of race, color, national origin, age, disability, sex, sexual orientation, or gender identity.            Thank you!     Thank you for choosing [facility]  for your care. Our goal is always to provide you with excellent care. Hearing back from our patients is one way we can continue to improve our services. Please take a few minutes to complete the written survey that you may receive in the mail after your visit with us. Thank you!             Your Updated Medication List - Protect others around you: Learn how to safely use, store and throw away your medicines at www.disposemymeds.org.          This list is accurate as of 6/12/18  9:08 AM.  Always use your most recent med list.                   Brand Name Dispense Instructions for use Diagnosis    ARTIFICIAL TEAR OP      Apply to eye 2 times daily        aspirin 81 MG tablet     100    1/2 tab daily        erythromycin ophthalmic ointment    ROMYCIN          glucosamine chondroitin 500 complex Caps      DAILY        LUTEIN PO      Take by mouth daily         NASONEX 50 MCG/ACT spray   Generic drug:  mometasone     17 g    USE TWO SPRAY(S) IN EACH NOSTRIL ONCE DAILY    Other chronic rhinitis       VITAMIN D PO      Take by mouth daily

## 2018-06-12 NOTE — PROGRESS NOTES
SUBJECTIVE:   Jeff Plasencia is a 89 year old male who presents to clinic today for the following health issues:      Hypotension Follow-up      Outpatient blood pressures are being checked at home.  Results are 85/55, 136/64, 116/58, 99/55, 146/66, 115/60, 182/85.    When his bp gets low he gets really dizzy and feels like he is going to pass out. Has to sit down right away where ever he's at. Sits down until the feeling goes away.       Amount of exercise or physical activity: lots of walking and yard work    Problems taking medications regularly: No    Medication side effects: none    Diet: regular (no restrictions)        Problem list and histories reviewed & adjusted, as indicated.  Additional history: as documented    Patient Active Problem List   Diagnosis     CANCER   PROSTATE     Dermatophytosis of body     Hx of supraventricular tachycardia     Hyperlipidemia LDL goal <130     Advanced directives, counseling/discussion     Memory loss     Radiculopathy of leg     At high risk for falls     Tremor     Other chronic rhinitis     Past Surgical History:   Procedure Laterality Date     COLONOSCOPY  9/28/2011    Procedure:COLONOSCOPY; Colonoscopy  ; Surgeon:LAMAR ALLEN; Location:[facility]     EYE SURGERY       GENITOURINARY SURGERY       HERNIA REPAIR       REPAIR ECTROPION Right 4/12/2018    Procedure: REPAIR ECTROPION;  RIGHT LOWER LID ECTROPION REPAIR WITH SKIN GRAFT ;  Surgeon: Garry Ortiz MD;  Location: [facility]     SURGICAL HISTORY OF -   11/1999    Right cataract surgery     SURGICAL HISTORY OF -   12/1999    Hernia repair     SURGICAL HISTORY OF -   2001    Normal Colonoscopy     SURGICAL HISTORY OF -   8/2002    Cryosurgery of the prostate     SURGICAL HISTORY OF -   4/2003    Left cataract surgery       Social History   Substance Use Topics     Smoking status: Never Smoker     Smokeless tobacco: Never Used     Alcohol use Yes      Comment: socially     Family History   Problem Relation Age  of Onset     Hypertension Brother      DIABETES Mother      age 70s     CEREBROVASCULAR DISEASE Sister      Cancer - colorectal No family hx of          Current Outpatient Prescriptions   Medication Sig Dispense Refill     ARTIFICIAL TEAR OP Apply to eye 2 times daily       ASPIRIN 81 MG OR TABS 1/2 tab daily 100 3     erythromycin (ROMYCIN) ophthalmic ointment        GLUCOSAMINE CHONDR 500 COMPLEX OR CAPS DAILY  0     LUTEIN PO Take by mouth daily        VITAMIN D PO Take by mouth daily        NASONEX 50 MCG/ACT spray USE TWO SPRAY(S) IN EACH NOSTRIL ONCE DAILY (Patient not taking: Reported on 6/12/2018) 17 g 11     Allergies   Allergen Reactions     Mustard Oil Anaphylaxis     Penicillins Anaphylaxis     Atenolol Rash     Cozaar [Losartan Potassium] Hives     Inderal [Propranolol Hcl]      Nightmares, headache, chestpain     Lisinopril Cough     Recent Labs   Lab Test  05/01/17   1102  04/26/17   0850  03/24/17   1008  11/03/16   0835  01/20/16   1115 08/21/15  08/11/14   05/06/11   1253   A1C   --    --    --    --    --    --    --    --    --   5.5   LDL   --    --    --   107*   --   121   --   118   < >   --    HDL   --    --    --   59   --   43   --   43   < >   --    TRIG   --    --    --   46   --   96   --   86   < >   --    ALT   --    --   17   --   28  27   < >  32   < >   --    CR   --    --   1.03   --   1.17  0.9   < >  0.9   < >   --    GFRESTIMATED  70   --   67   --   58*  >60   < >   --    < >   --    GFRESTBLACK  84   --   81   --   70   --    < >   --    < >   --    POTASSIUM   --    --   4.1   --   4.2  4.6   < >  4.4   < >   --    TSH   --   3.21   --    --   2.03   --    --    --    < >  2.15    < > = values in this interval not displayed.      BP Readings from Last 3 Encounters:   06/12/18 100/52   05/22/18 98/54   04/25/18 135/80    Wt Readings from Last 3 Encounters:   06/12/18 185 lb (83.9 kg)   05/22/18 191 lb (86.6 kg)   04/12/18 196 lb (88.9 kg)                  Labs reviewed in  "EPIC    Reviewed and updated as needed this visit by clinical staff       Reviewed and updated as needed this visit by Provider         ROS:  Constitutional, HEENT, cardiovascular, pulmonary, gi and gu systems are negative, except as otherwise noted.    OBJECTIVE:     /52 (Cuff Size: Adult Regular)  Pulse 88  Temp 97.8  F (36.6  C) (Tympanic)  Resp 18  Ht 5' 11\" (1.803 m)  Wt 185 lb (83.9 kg)  SpO2 98%  BMI 25.8 kg/m2  Body mass index is 25.8 kg/(m^2).  GENERAL: alert, no distress and elderly  EYES: Eyes grossly normal to inspection, PERRL and conjunctivae and sclerae normal  NECK: no adenopathy, no asymmetry, masses, or scars and thyroid normal to palpation  RESP: lungs clear to auscultation - no rales, rhonchi or wheezes  CV: regular rate and rhythm, normal S1 S2, no S3 or S4, no murmur, click or rub, no peripheral edema and peripheral pulses strong  ABDOMEN: soft, nontender, no hepatosplenomegaly, no masses and bowel sounds normal  MS: no gross musculoskeletal defects noted, no edema  NEURO: Normal strength and tone, mentation intact and speech normal    ASSESSMENT/PLAN:         ICD-10-CM    1. Labile blood pressure R09.89 Cortisol     CBC with platelets     Comprehensive metabolic panel (BMP + Alb, Alk Phos, ALT, AST, Total. Bili, TP)     EKG 12-lead complete w/read - Clinics       89-year-old male presents with labile blood pressure.  Previous Holter monitor, 24-hour blood pressure recording results and cardiology consult from 2016 reviewed.  Patient has been having this issue for quite a while without any significant consequence.  ECG today showed sinus rhythm with occasional ventricular ectopic beat.  CBC, CMP and cortisol level ordered for further evaluation.  Reassurance provided and suggested to give himself some time before changing postures and maintain well hydration. Explained that he does not necessarily need to check his blood pressure that often. Patient understood and in agreement with " the above plan. All questions answered.       Jostin Carlson MD  [facility]

## 2018-06-13 DIAGNOSIS — R73.9 ELEVATED RANDOM BLOOD GLUCOSE LEVEL: Primary | ICD-10-CM

## 2018-06-13 LAB
CORTIS SERPL-MCNC: 11.9 UG/DL (ref 4–22)
HBA1C MFR BLD: 5.6 % (ref 0–5.6)

## 2018-09-11 NOTE — DISCHARGE INSTRUCTIONS
Sutures need to come out in 1 week.  Pressure dressing applied today. Change dressing tomorrow as needed.  Ice pack to forehead today (on for 20 minutes and off for 60 minutes).  Rest and move slowly today.  Someone should stay with you today.  Return to the emergency department for headache, dizziness, altered level of consciousness, vomiting, confusion, or worse in any way.      * HEAD INJURY, no wake-up (Adult)    You have had a head injury. It does not appear serious at this time. Sometimes symptoms of a more serious problem (concussion, bruising or bleeding in the brain) may appear later. Therefore, watch for the warning signs listed below.  HOME CARE:       During the next 24 hours someone must stay with you to check for the signs below. It is not necessary to stay awake or be awakened during the night.    If you have swelling of the face or scalp, apply an ice pack (ice cubes in a plastic bag, wrapped in a towel) for 20 minutes. Do this every 1-2 hours until the swelling starts to go down.    You may use acetaminophen (Tylenol) 650-1000 mg every 6 hours or ibuprofen (Motrin, Advil) 600 mg every 6-8 hours with food to control pain, if you are able to take these medicines. [NOTE: If you have chronic liver or kidney disease or ever had a stomach ulcer or GI bleeding, talk with your doctor before using these medicines.] Do not take aspirin after a head injury.    For the next 24 hours:    Do not take alcohol, sedatives or medicines that make you sleepy.    Do not drive or operate machinery.    Avoid strenuous activities. No lifting or straining.    If you have had any symptoms of a concussion today (nausea, vomiting, dizziness, confusion, headache, memory loss or if you were knocked out), do not return to sports or any activity that could result in another head injury until 2 full weeks after all symptoms are gone and you have been cleared by your doctor. A second head injury before fully recovering from the  first one can lead to serious brain injury.  FOLLOW UP with your doctor if symptoms are not improving after 24 hours, or as directed.  GET PROMPT MEDICAL ATTENTION if any of the following warning signs occur:    Repeated vomiting    Severe or worsening headache or dizziness    Unusual drowsiness, or unable to awaken as usual    Confusion or change in behavior or speech, memory loss, blurred vision    Convulsion (seizure)    Increasing scalp or face swelling    Redness, warmth or pus from the swollen area    Fluid drainage or bleeding from the nose or ears    1654-6686 [employer]. [address] [place] [address]. All rights reserved. This information is not intended as a substitute for professional medical care. Always follow your healthcare professional's instructions.  This information has been modified by your health care provider with permission from the publisher.           *LACERATION (All: sutures, staples, tape, glue)  A laceration is a cut through the skin. This will usually require stitches (sutures) or staples if it is deep. Minor cuts may be treated with a tape closure ( Steri-Strips ) or Dermabond skin glue.    HOME CARE:  1. EXTREMITY, FACE or TRUNK WOUNDS: Keep the wound clean and dry. If a bandage was applied and it becomes wet or dirty, replace it. Otherwise, leave it in place for the first 24 hours.    If stitches or staples were used, clean the wound daily. Protect the wound from sunlight and tanning lamps.    After removing the bandage, wash the area with soap and water. Use a wet cotton swab (Q tip) to loosen and remove any blood or crust that forms.    After cleaning, apply a thin layer of Polysporin or Bacitracin ointment. This will keep the wound clean and make it easier to remove the stitches or staples. Reapply a fresh bandage.    You may remove the bandage to shower as usual after the first 24 hours, but do not soak the area in water (no swimming) until the stitches  or staples are removed.    If Steri-Strips were used, keep the area clean and dry. If it becomes wet, blot it dry with a towel. It is okay to take a brief shower, but avoid scrubbing the area.    If Dermabond skin adhesive was used, do not scratch, rub or pick at the adhesive film. Do not place tape directly over the film. Do not apply liquid, ointment or creams to the wound while the film is in place. Do not clean the wound with peroxide and do not apply ointments. Avoid activities that cause heavy sweating until the film has fallen off. Protect the wound from prolonged exposure to sunlight or tanning lamps. You may shower as usual but do not soak the wound in water (no baths or swimming). The film will fall off by itself in 5-10 days.  2. SCALP WOUNDS: During the first two days, you may carefully rinse your hair in the shower to remove blood, glass or dirt particles. After two days, you may shower and shampoo your hair normally. Do not soak your scalp in the tub or go swimming until the stitches or staples have been removed.  3. MOUTH WOUNDS: Eat soft foods to reduce pain. If the cut is inside of your mouth, clean by rinsing after each meal and at bedtime with a mixture of equal parts water and Hydrogen Peroxide (do not swallow!). Or, you can use a cotton swab to directly apply Hydrogen Peroxide onto the cut.  4. You may use acetaminophen (Tylenol) 650-1000 mg every 6 hours or ibuprofen (Motrin, Advil) 600 mg every 6-8 hours with food to control pain, if you are able to take these medicines. [NOTE: If you have chronic liver or kidney disease or ever had a stomach ulcer or GI bleeding, talk with your doctor before using these medicines.]  Use sunscreen on the area for 6 months after the wound heals to keep the scar from getting darker.   FOLLOW UP: Most skin wounds heal within ten days. Mouth and facial wounds heal within five days. However, even with proper treatment, a wound infection may sometimes occur.  Therefore, you should check the wound daily for signs of infection listed below.  Stitches should be removed from the face within five days; stitches and staples should be removed from other parts of the body within 7-10 days. Unless you are told to come back to the emergency room, you may have your doctor or urgent care remove the stitches. If dissolving stitches were used in the mouth, these will fall out or dissolve without the need for removal. If tape closures ( Steri-Strips ) were used, remove them yourself if they have not fallen off after 7 days. If Dermabond skin glue was used, the film will fall off by itself in 5-10 days.   GET PROMPT MEDICAL ATTENTION if any of the following occur:    Increasing pain in the wound    Redness, swelling or pus coming from the wound    Fever over 101 F (38.3 C) oral    If stitches or staples come apart or fall out or if Steri-Strips fall off before seven days    If the wound edges re-open    Bleeding not controlled by direct pressure    5257-5836 [employer], [address] [place] [address], [place] [address]. All rights reserved. This information is not intended as a substitute for professional medical care. Always follow your healthcare professional's instructions.

## 2018-09-11 NOTE — ED AVS SNAPSHOT
[facility]    [address] [facility] [address]    Phone:  [phone]    Fax:  [phone]                                       Jeff Plasencia   MRN: [id number]    Department:  [facility]   Date of Visit:  9/11/2018           Patient Information     Date Of Birth          5/20/1923        Your diagnoses for this visit were:     Laceration of forehead, initial encounter-- total of 8 sutures placed.     Closed head injury, initial encounter     Skin tear of left elbow without complication, initial encounter        You were seen by Melinda Suarez APRN CNP.      Follow-up Information     Follow up with Jostin Carlson MD.    Specialty:  Family Practice    Why:  As needed    Contact information:    [address] [facility] [address]  [phone]          Follow up with [facility].    Specialty:  EMERGENCY MEDICINE    Why:  If symptoms worsen    Contact information:    [address] [place] [address] [phone]    Additional information:    The medical center is located at   [address] [place] [address]. (between [address] and   [facility] in [place], four miles north   of [place]).        Discharge Instructions       Sutures need to come out in 1 week.  Pressure dressing applied today. Change dressing tomorrow as needed.  Ice pack to forehead today (on for 20 minutes and off for 60 minutes).  Rest and move slowly today.  Someone should stay with you today.  Return to the emergency department for headache, dizziness, altered level of consciousness, vomiting, confusion, or worse in any way.      * HEAD INJURY, no wake-up (Adult)    You have had a head injury. It does not appear serious at this time. Sometimes symptoms of a more serious problem (concussion, bruising or bleeding in the brain) may appear later. Therefore, watch for the warning signs listed below.  HOME CARE:       During the next 24 hours someone  must stay with you to check for the signs below. It is not necessary to stay awake or be awakened during the night.    If you have swelling of the face or scalp, apply an ice pack (ice cubes in a plastic bag, wrapped in a towel) for 20 minutes. Do this every 1-2 hours until the swelling starts to go down.    You may use acetaminophen (Tylenol) 650-1000 mg every 6 hours or ibuprofen (Motrin, Advil) 600 mg every 6-8 hours with food to control pain, if you are able to take these medicines. [NOTE: If you have chronic liver or kidney disease or ever had a stomach ulcer or GI bleeding, talk with your doctor before using these medicines.] Do not take aspirin after a head injury.    For the next 24 hours:    Do not take alcohol, sedatives or medicines that make you sleepy.    Do not drive or operate machinery.    Avoid strenuous activities. No lifting or straining.    If you have had any symptoms of a concussion today (nausea, vomiting, dizziness, confusion, headache, memory loss or if you were knocked out), do not return to sports or any activity that could result in another head injury until 2 full weeks after all symptoms are gone and you have been cleared by your doctor. A second head injury before fully recovering from the first one can lead to serious brain injury.  FOLLOW UP with your doctor if symptoms are not improving after 24 hours, or as directed.  GET PROMPT MEDICAL ATTENTION if any of the following warning signs occur:    Repeated vomiting    Severe or worsening headache or dizziness    Unusual drowsiness, or unable to awaken as usual    Confusion or change in behavior or speech, memory loss, blurred vision    Convulsion (seizure)    Increasing scalp or face swelling    Redness, warmth or pus from the swollen area    Fluid drainage or bleeding from the nose or ears    [id number] [employer]. [address] [place] [address], [place] [address]. All rights reserved. This information is not intended as a  substitute for professional medical care. Always follow your healthcare professional's instructions.  This information has been modified by your health care provider with permission from the publisher.           *LACERATION (All: sutures, staples, tape, glue)  A laceration is a cut through the skin. This will usually require stitches (sutures) or staples if it is deep. Minor cuts may be treated with a tape closure ( Steri-Strips ) or Dermabond skin glue.    HOME CARE:  1. EXTREMITY, FACE or TRUNK WOUNDS: Keep the wound clean and dry. If a bandage was applied and it becomes wet or dirty, replace it. Otherwise, leave it in place for the first 24 hours.    If stitches or staples were used, clean the wound daily. Protect the wound from sunlight and tanning lamps.    After removing the bandage, wash the area with soap and water. Use a wet cotton swab (Q tip) to loosen and remove any blood or crust that forms.    After cleaning, apply a thin layer of Polysporin or Bacitracin ointment. This will keep the wound clean and make it easier to remove the stitches or staples. Reapply a fresh bandage.    You may remove the bandage to shower as usual after the first 24 hours, but do not soak the area in water (no swimming) until the stitches or staples are removed.    If Steri-Strips were used, keep the area clean and dry. If it becomes wet, blot it dry with a towel. It is okay to take a brief shower, but avoid scrubbing the area.    If Dermabond skin adhesive was used, do not scratch, rub or pick at the adhesive film. Do not place tape directly over the film. Do not apply liquid, ointment or creams to the wound while the film is in place. Do not clean the wound with peroxide and do not apply ointments. Avoid activities that cause heavy sweating until the film has fallen off. Protect the wound from prolonged exposure to sunlight or tanning lamps. You may shower as usual but do not soak the wound in water (no baths or swimming). The  film will fall off by itself in 5-10 days.  2. SCALP WOUNDS: During the first two days, you may carefully rinse your hair in the shower to remove blood, glass or dirt particles. After two days, you may shower and shampoo your hair normally. Do not soak your scalp in the tub or go swimming until the stitches or staples have been removed.  3. MOUTH WOUNDS: Eat soft foods to reduce pain. If the cut is inside of your mouth, clean by rinsing after each meal and at bedtime with a mixture of equal parts water and Hydrogen Peroxide (do not swallow!). Or, you can use a cotton swab to directly apply Hydrogen Peroxide onto the cut.  4. You may use acetaminophen (Tylenol) 650-1000 mg every 6 hours or ibuprofen (Motrin, Advil) 600 mg every 6-8 hours with food to control pain, if you are able to take these medicines. [NOTE: If you have chronic liver or kidney disease or ever had a stomach ulcer or GI bleeding, talk with your doctor before using these medicines.]  Use sunscreen on the area for 6 months after the wound heals to keep the scar from getting darker.   FOLLOW UP: Most skin wounds heal within ten days. Mouth and facial wounds heal within five days. However, even with proper treatment, a wound infection may sometimes occur. Therefore, you should check the wound daily for signs of infection listed below.  Stitches should be removed from the face within five days; stitches and staples should be removed from other parts of the body within 7-10 days. Unless you are told to come back to the emergency room, you may have your doctor or urgent care remove the stitches. If dissolving stitches were used in the mouth, these will fall out or dissolve without the need for removal. If tape closures ( Steri-Strips ) were used, remove them yourself if they have not fallen off after 7 days. If Dermabond skin glue was used, the film will fall off by itself in 5-10 days.   GET PROMPT MEDICAL ATTENTION if any of the following  occur:    Increasing pain in the wound    Redness, swelling or pus coming from the wound    Fever over 101 F (38.3 C) oral    If stitches or staples come apart or fall out or if Steri-Strips fall off before seven days    If the wound edges re-open    Bleeding not controlled by direct pressure    0815-5754 [employer], [address] [place] [address], [place] [address]. All rights reserved. This information is not intended as a substitute for professional medical care. Always follow your healthcare professional's instructions.      24 Hour Appointment Hotline       To make an appointment at any [facility]3], call [phone] ([phone]). If you don't have a family doctor or clinic, we will help you find one. [facility] clinics are conveniently located to serve the needs of you and your family.             Review of your medicines      Our records show that you are taking the medicines listed below. If these are incorrect, please call your family doctor or clinic.        Dose / Directions Last dose taken    ARTIFICIAL TEAR OP        Apply to eye 2 times daily   Refills:  0        aspirin 81 MG tablet   Quantity:  100        1/2 tab daily   Refills:  3        erythromycin ophthalmic ointment   Commonly known as:  ROMYCIN        Refills:  0        glucosamine chondroitin 500 complex Caps        DAILY   Refills:  0        LUTEIN PO        Take by mouth daily   Refills:  0        NASONEX 50 MCG/ACT spray   Quantity:  17 g   Generic drug:  mometasone        USE TWO SPRAY(S) IN EACH NOSTRIL ONCE DAILY   Refills:  11        VITAMIN D PO        Take by mouth daily   Refills:  0                Procedures and tests performed during your visit     CT Head w/o Contrast    Cervical spine CT w/o contrast      Orders Needing Specimen Collection     None      Pending Results     No orders found from 9/9/2018 to 9/12/2018.            Pending Culture Results     No orders found from 9/9/2018 to 9/12/2018.            Pending  Results Instructions     If you had any lab results that were not finalized at the time of your Discharge, you can call the ED Lab Result RN at [phone]. You will be contacted by this team for any positive Lab results or changes in treatment. The nurses are available 7 days a week from 10A to 6:30P.  You can leave a message 24 hours per day and they will return your call.        Test Results From Your Hospital Stay        9/11/2018 11:59 AM      Narrative     CT SCAN OF THE HEAD WITHOUT CONTRAST   9/11/2018 11:19 AM     HISTORY: fall off curb. hit left forehead on pavement.;     TECHNIQUE:  Axial images of the head and coronal reformations without  IV contrast material. Radiation dose for this scan was reduced using  automated exposure control, adjustment of the mA and/or kV according  to patient size, or iterative reconstruction technique.    COMPARISON: None.    FINDINGS:  There is generalized atrophy of the brain.  White matter  changes are present in the cerebral hemispheres that are consistent  with small vessel ischemic disease in this age patient. There is no  evidence of intracranial hemorrhage, mass, acute infarct or anomaly.  The visualized portions of the sinuses and mastoids appear normal. No  intracranial hemorrhage or skull fractures. There is soft tissue  swelling over the left frontal region.        Impression     IMPRESSION: No intracranial hemorrhage or skull fractures are  identified.      QIANA RODRIGUEZ MD         9/11/2018 11:59 AM      Narrative     CT CERVICAL SPINE WITHOUT CONTRAST   9/11/2018 11:19 AM     HISTORY: fall off curb. hit forehead on pavement. chronic neck pain.,  fall. hit forehead on pavement. r/o c-spine fracture.;      TECHNIQUE: Axial images of the cervical spine were obtained without  intravenous contrast. Multiplanar reformations were performed.   Radiation dose for this scan was reduced using automated exposure  control, adjustment of the mA and/or kV according to patient  "size, or  iterative reconstruction technique.    COMPARISON: None.    FINDINGS: There is no evidence of fracture. There is reversal of the  cervical lordosis.  There is ankylosis of the C5-6 and C6-7 vertebral  bodies.    Craniocervical junction: Normal.     C1-C2:  Normal.     C2-C3:  Mild annular disc bulge. Central canal normal. Severe  degenerative change in the facet joints.     C3-C4:  Mild annular disc bulge. Central canal normal. Moderate  degenerative change in the facet joints. Moderate left foraminal  stenosis due to the degenerative changes.     C4-C5:  Loss of disc space height. Broad-based disc osteophyte complex  causing mild mass effect on the dural sac. This extends both to the  right and left of midline. Moderate-severe right and moderate left  foraminal stenosis.     C5-C6:  Ankylosis of the vertebral bodies. Central canal mildly  narrowed. Mild left foraminal stenosis due to an uncinate spur.      C6-C7:  Ankylosis of the vertebral bodies. Central canal normal. Mild  bilateral foraminal stenosis.      C7-T1:   Mild annular disc bulge. Central canal normal. Severe  degenerative change in the facet joints.        Impression     IMPRESSION:    1. Negative for fracture.  2. Multilevel degenerative change.    QIANA RODRIGUEZ MD                Thank you for choosing [facility]       Thank you for choosing [facility] for your care. Our goal is always to provide you with excellent care. Hearing back from our patients is one way we can continue to improve our services. Please take a few minutes to complete the written survey that you may receive in the mail after you visit with us. Thank you!        [email or web address] Information     [employer] lets you send messages to your doctor, view your test results, renew your prescriptions, schedule appointments and more. To sign up, go to [email or web address] . Click on \"Log in\" on the left side of the screen, which will take you to the Welcome page. Then click on \"Sign up Now\" on " the right side of the page.     You will be asked to enter the access code listed below, as well as some personal information. Please follow the directions to create your username and password.     Your access code is: [email or web address]  Expires: 12/10/2018 12:01 PM     Your access code will  in 90 days. If you need help or a new code, please call your [facility] clinic or [phone].        Care EveryWhere ID     This is your Care EveryWhere ID. This could be used by other organizations to access your [facility] medical records  [id number]        Equal Access to Services     [facility]: Lainey Gay, mercedez qureshi, pete kaur, arpita portillo . So [facility] [phone].    ATENCIÓN: Si habla español, tiene a gabriel disposición servicios gratuitos de asistencia lingüística. Llame al [phone].    We comply with applicable federal civil rights laws and [place] laws. We do not discriminate on the basis of race, color, national origin, age, disability, sex, sexual orientation, or gender identity.            After Visit Summary       This is your record. Keep this with you and show to your community pharmacist(s) and doctor(s) at your next visit.

## 2018-09-11 NOTE — TELEPHONE ENCOUNTER
"Phone call from pt's grandson's girlfriend stating pt just had \"bad fall\" in front of local back with \"head injury\". No other details given. 911 was called, EMS present.  Granddtr asking if pt can be seen in clinic for eval VS going to EC/[facility].  Advised ED per ambulance transport for eval.  States she will be driving pt to ED despite ambulance recommendation.  CORTEZ Ricardo RN      "

## 2018-09-11 NOTE — ED PROVIDER NOTES
History     Chief Complaint   Patient presents with     Fall     tripped off curb, fell hit head and elbow, no LOC  takes 1/2 81 mg ASA tablet     HPI  Jeff Plasencia is a 89 year old male who presents to the emergency department after falling and hitting his head.  Patient was walking across the street to [facility].  He states he did not notice the curb and tripped when he stepped off the curb.  Patient fell hitting the left side of his forehead.  Patient suffered a laceration to his forehead and a skin tear to his left elbow.  Patient denies any loss of consciousness.  Denies nausea or vomiting.  Reports history of chronic neck pain, but does not feel worse after the fall.  Patient takes 1/2 tablet of a baby aspirin per day.  Denies any pain in his extremities.  Denies back pain.  EMS was on the scene but patient declined transport.  Patient transported here by his granddaughter.    Problem List:    Patient Active Problem List    Diagnosis Date Noted     CANCER   PROSTATE      Priority: High     Dx 2001. Grade 2+2, PSA 7.9. Cryosurgery 2002. Rx lupron 2002.        Other chronic rhinitis 02/21/2018     Priority: Medium     Tremor 04/25/2017     Priority: Medium     At high risk for falls 02/24/2014     Priority: Medium     Advanced directives, counseling/discussion 05/06/2011     Priority: Medium     Health care directive received and was notarized on 9/19/11   This document was sent to scanning on 10/11/2011 11:43 AM  Justina MENSAH CMA         Hyperlipidemia LDL goal <130 10/31/2010     Priority: Medium     Radiculopathy of leg 06/20/2011     Priority: Low     left foot drop with left radiculopathy per neuro evaluation 6/11       Memory loss 05/06/2011     Priority: Low     Hx of supraventricular tachycardia      Priority: Low     history of non-sustained SVT, slow nonsustained VT by holter 2001. Echo 2000 normal.       Dermatophytosis of body 05/27/2005     Priority: Low     Cruris, umbilicus  Problem list name  updated by automated process. Provider to review          Past Medical History:    Past Medical History:   Diagnosis Date     BENIGN ESSENTIAL TREMOR      Calculus of kidney      Herpes zoster without mention of complication      Impotence of organic origin      Parotid mass 3/26/2009     Undiagnosed cardiac murmurs 2/9/2007       Past Surgical History:    Past Surgical History:   Procedure Laterality Date     COLONOSCOPY  9/28/2011    Procedure:COLONOSCOPY; Colonoscopy  ; Surgeon:LAMAR ALLEN; Location:[facility]     EYE SURGERY       GENITOURINARY SURGERY       HERNIA REPAIR       REPAIR ECTROPION Right 4/12/2018    Procedure: REPAIR ECTROPION;  RIGHT LOWER LID ECTROPION REPAIR WITH SKIN GRAFT ;  Surgeon: Garry Ortiz MD;  Location: [facility]     SURGICAL HISTORY OF -   11/1999    Right cataract surgery     SURGICAL HISTORY OF -   12/1999    Hernia repair     SURGICAL HISTORY OF -   2001    Normal Colonoscopy     SURGICAL HISTORY OF -   8/2002    Cryosurgery of the prostate     SURGICAL HISTORY OF -   4/2003    Left cataract surgery       Family History:    Family History   Problem Relation Age of Onset     Hypertension Brother      Diabetes Mother      age 70s     Cerebrovascular Disease Sister      Cancer - colorectal No family hx of        Social History:  Marital Status:   [5]  Social History   Substance Use Topics     Smoking status: Never Smoker     Smokeless tobacco: Never Used     Alcohol use Yes      Comment: socially        Medications:      ARTIFICIAL TEAR OP   ASPIRIN 81 MG OR TABS   GLUCOSAMINE CHONDR 500 COMPLEX OR CAPS   LUTEIN PO   VITAMIN D PO   NASONEX 50 MCG/ACT spray         Review of Systems   Constitutional: Negative for activity change, appetite change, chills and fever.   Respiratory: Negative for cough, chest tightness and shortness of breath.    Cardiovascular: Negative for chest pain.   Gastrointestinal: Negative for abdominal pain.   Musculoskeletal: Positive for neck pain  "(chronic, not worse). Negative for back pain.   Skin: Positive for wound (forehead and left elbow).   Neurological: Negative for dizziness, syncope, speech difficulty, light-headedness and headaches.   Psychiatric/Behavioral: Negative for confusion.       Physical Exam   BP: 137/75  Heart Rate: 82  Temp: 98  F (36.7  C)  Resp: 16  Height: 181.6 cm (5' 11.5\")  Weight: 78 kg (172 lb)  SpO2: 98 %      Physical Exam   Constitutional: He is oriented to person, place, and time. He appears well-developed and well-nourished. No distress.   HENT:   Head: Normocephalic. Head is with contusion and with laceration. Head is without raccoon's eyes, without Medrano's sign, without right periorbital erythema and without left periorbital erythema.       Right Ear: Tympanic membrane and external ear normal. No hemotympanum.   Left Ear: Tympanic membrane and external ear normal. No hemotympanum.   Nose: Nose normal.   Mouth/Throat: Oropharynx is clear and moist.   Eyes: Conjunctivae and EOM are normal.   Cardiovascular: Normal rate, regular rhythm and normal heart sounds.    No murmur heard.  Pulmonary/Chest: Effort normal. He has rales (bases bilaterally).   Abdominal: Soft. Bowel sounds are normal. He exhibits no distension. There is no tenderness.   Neurological: He is alert and oriented to person, place, and time.   Skin: Skin is warm and dry.            ED Course     ED Course     Procedures               Results for orders placed or performed during the hospital encounter of 09/11/18 (from the past 24 hour(s))   CT Head w/o Contrast    Narrative    CT SCAN OF THE HEAD WITHOUT CONTRAST   9/11/2018 11:19 AM     HISTORY: fall off curb. hit left forehead on pavement.;     TECHNIQUE:  Axial images of the head and coronal reformations without  IV contrast material. Radiation dose for this scan was reduced using  automated exposure control, adjustment of the mA and/or kV according  to patient size, or iterative reconstruction " technique.    COMPARISON: None.    FINDINGS:  There is generalized atrophy of the brain.  White matter  changes are present in the cerebral hemispheres that are consistent  with small vessel ischemic disease in this age patient. There is no  evidence of intracranial hemorrhage, mass, acute infarct or anomaly.  The visualized portions of the sinuses and mastoids appear normal. No  intracranial hemorrhage or skull fractures. There is soft tissue  swelling over the left frontal region.      Impression    IMPRESSION: No intracranial hemorrhage or skull fractures are  identified.      QIANA RODRIGUEZ MD   Cervical spine CT w/o contrast    Narrative    CT CERVICAL SPINE WITHOUT CONTRAST   9/11/2018 11:19 AM     HISTORY: fall off curb. hit forehead on pavement. chronic neck pain.,  fall. hit forehead on pavement. r/o c-spine fracture.;      TECHNIQUE: Axial images of the cervical spine were obtained without  intravenous contrast. Multiplanar reformations were performed.   Radiation dose for this scan was reduced using automated exposure  control, adjustment of the mA and/or kV according to patient size, or  iterative reconstruction technique.    COMPARISON: None.    FINDINGS: There is no evidence of fracture. There is reversal of the  cervical lordosis.  There is ankylosis of the C5-6 and C6-7 vertebral  bodies.    Craniocervical junction: Normal.     C1-C2:  Normal.     C2-C3:  Mild annular disc bulge. Central canal normal. Severe  degenerative change in the facet joints.     C3-C4:  Mild annular disc bulge. Central canal normal. Moderate  degenerative change in the facet joints. Moderate left foraminal  stenosis due to the degenerative changes.     C4-C5:  Loss of disc space height. Broad-based disc osteophyte complex  causing mild mass effect on the dural sac. This extends both to the  right and left of midline. Moderate-severe right and moderate left  foraminal stenosis.     C5-C6:  Ankylosis of the vertebral bodies.  Central canal mildly  narrowed. Mild left foraminal stenosis due to an uncinate spur.      C6-C7:  Ankylosis of the vertebral bodies. Central canal normal. Mild  bilateral foraminal stenosis.      C7-T1:   Mild annular disc bulge. Central canal normal. Severe  degenerative change in the facet joints.      Impression    IMPRESSION:    1. Negative for fracture.  2. Multilevel degenerative change.    QIANA RODRIGUEZ MD     [facility] Procedure Note        Laceration Repair:    Performed by: Melinda Suarez  Authorized by: Melinda Suarez  Consent given by: Patient who states understanding of the procedure being performed after discussing the risks, benefits and alternatives.    Preparation: Patient was prepped and draped in usual sterile fashion.  Irrigation solution: saline  Body area:left forehead  Laceration length:   #1) 1cm  #2) 2 cm  #3) <0.5cm  Contamination: The wound is not contaminated.  Foreign bodies:none  Tendon involvement: none  Anesthesia: Local  Local anesthetic: Lidocaine     1%  Anesthetic total: 4 ml  Debridement: none  Skin closure: Closed with #1) 3 sutures, #2) 4 sutures, and #3) 1 suture (total 8 sutures) x 5.0 Ethilon  Technique: interrupted  Approximation: close  Approximation difficulty: simple  Patient tolerance: Patient tolerated the procedure well with no immediate complications.        Medications - No data to display    Assessments & Plan (with Medical Decision Making)     89-year-old male who presents emergency department after he fell while trying to cross the street to go to [facility].  He missed a step on the curb falling and hitting the left side of his forehead on the pavement.  No LOC.  Patient suffered lacerations to the left forehead with a large hematoma.  Left elbow skin tear.      On exam patient is alert and oriented.  No dizziness.  Denies headache.  Reports chronic neck pain, but states it is not worse since the fall.  No significant tenderness with palpation  to the C-spine.  No palpable skull depression.   Normotensive.  Given patient's age and mechanism of injury a head CT and cervical spine CT were obtained to rule out any intracranial hemorrhage, skull fracture, or a spine fracture/injury.  Head CT and cervical spine CT are negative for acute pathology.    Left forehead laceration repaired as noted above.  Left elbow skin tear was cleansed and dressing applied.  Discussed the results of imaging with patient.    Patient was discharged home stable and instructed as follows:   Sutures need to come out in 1 week.  Pressure dressing applied today. Change dressing tomorrow as needed.  Ice pack to forehead today (on for 20 minutes and off for 60 minutes).  Rest and move slowly today.  Someone should stay with you today.  Return to the emergency department for headache, dizziness, altered level of consciousness, vomiting, confusion, or worse in any way.      I have reviewed the nursing notes.    I have reviewed the findings, diagnosis, plan and need for follow up with the patient.      Discharge Medication List as of 9/11/2018 12:01 PM          Final diagnoses:   Laceration of forehead, initial encounter-- total of 8 sutures placed.   Closed head injury, initial encounter   Skin tear of left elbow without complication, initial encounter       9/11/2018   [facility] EMERGENCY DEPARTMENT     Melinda Suarez APRN CNP  09/11/18 [id number]

## 2018-09-11 NOTE — ED AVS SNAPSHOT
[facility] Emergency Department    [address] [address]    Phone:  [phone]    Fax:  [phone]                                       Jeff Plasencia   MRN: [id number]    Department:  [facility] Emergency Department   Date of Visit:  9/11/2018           After Visit Summary Signature Page     I have received my discharge instructions, and my questions have been answered. I have discussed any challenges I see with this plan with the nurse or doctor.    ..........................................................................................................................................  Patient/Patient Representative Signature      ..........................................................................................................................................  Patient Representative Print Name and Relationship to Patient    ..................................................               ................................................  Date                                            Time    ..........................................................................................................................................  Reviewed by Signature/Title    ...................................................              ..............................................  Date                                                            Time          22EPIC Rev 08/18

## 2018-09-11 NOTE — ED NOTES
Fell off curb at 0930 striking forehead and left elbow-police and ems responded-no loc-alert usual self friend reports-denies other injuries-no cervical tenderness-small lac left forehead spurting blood when pressure dressing removed-small skin tear left elbow

## 2018-09-18 NOTE — NURSING NOTE
Jeff Plasencia presents to the clinic today for removal of sutures.  The patient has had the sutures in place for 7 days.  There has been no history of infection or drainage.  8 sutures are seen located on the lt forehead.  The wounds are healing well with no signs of infection.  Tetanus status is up to date.   All sutures were easily removed today.  Routine wound care discussed.  The patient will follow up as needed.  CORTEZ Ricardo RN

## 2018-09-18 NOTE — MR AVS SNAPSHOT
"              After Visit Summary   2018    Jeff Plasencia    MRN: [id number]           Patient Information     Date Of Birth          1923        Visit Information        Provider Department      2018 11:00 AM JESUS ROY RN [facility]        Today's Diagnoses     Visit for suture removal    -  1       Follow-ups after your visit        Who to contact     If you have questions or need follow up information about today's clinic visit or your schedule please contact [facility] directly at [phone].  Normal or non-critical lab and imaging results will be communicated to you by [employer], letter or phone within 4 business days after the clinic has received the results. If you do not hear from us within 7 days, please contact the clinic through [employer] or phone. If you have a critical or abnormal lab result, we will notify you by phone as soon as possible.  Submit refill requests through [employer] or call your pharmacy and they will forward the refill request to us. Please allow 3 business days for your refill to be completed.          Additional Information About Your Visit        MyChart Information     [employer] lets you send messages to your doctor, view your test results, renew your prescriptions, schedule appointments and more. To sign up, go to [email or web address] . Click on \"Log in\" on the left side of the screen, which will take you to the Welcome page. Then click on \"Sign up Now\" on the right side of the page.     You will be asked to enter the access code listed below, as well as some personal information. Please follow the directions to create your username and password.     Your access code is: [email or web address]  Expires: 12/10/2018 12:01 PM     Your access code will  in 90 days. If you need help or a new code, please call your [facility] or [phone].        Care EveryWhere ID     This is your Care EveryWhere ID. This could be used by other " organizations to access your [place] medical records  [id number]         Blood Pressure from Last 3 Encounters:   09/11/18 (!) 149/96   06/12/18 100/52   05/22/18 98/54    Weight from Last 3 Encounters:   09/11/18 172 lb (78 kg)   06/12/18 185 lb (83.9 kg)   05/22/18 191 lb (86.6 kg)              Today, you had the following     No orders found for display       Primary Care Provider Office Phone # Fax #    Jostin Guidry MD [facility] [phone]       [address] [facility] [address]        Equal Access to Services     [facility] Center[facility] : Hadii aad moises hadruby Sosruthi, waaxda luqadaha, qaybta kaalmada delorisyada, arpita portillo . So [facility] [phone].    ATENCIÓN: Si habla español, tiene a gabriel disposición servicios gratuitos de asistencia lingüística. Llame al [phone].    We comply with applicable federal civil rights laws and [place] laws. We do not discriminate on the basis of race, color, national origin, age, disability, sex, sexual orientation, or gender identity.            Thank you!     Thank you for choosing [facility]  for your care. Our goal is always to provide you with excellent care. Hearing back from our patients is one way we can continue to improve our services. Please take a few minutes to complete the written survey that you may receive in the mail after your visit with us. Thank you!             Your Updated Medication List - Protect others around you: Learn how to safely use, store and throw away your medicines at www.disposemymeds.org.          This list is accurate as of 9/18/18 11:13 AM.  Always use your most recent med list.                   Brand Name Dispense Instructions for use Diagnosis    ARTIFICIAL TEAR OP      Apply to eye 2 times daily        aspirin 81 MG tablet     100    1/2 tablet by mouth daily        glucosamine chondroitin 500 complex Caps      1 capsule by mouth DAILY        LUTEIN PO      Take 1 tablet by mouth daily         NASONEX 50 MCG/ACT spray   Generic drug:  mometasone     17 g    USE TWO SPRAY(S) IN EACH NOSTRIL ONCE DAILY    Other chronic rhinitis       VITAMIN D PO      Take 1,000 Units by mouth daily In Summer, 2000 units in winter

## 2018-10-10 NOTE — NURSING NOTE
Fell at the gas station parking lot this morning and has a 2 cm laceration on the right side of his forehead. Advised that he does need to have this sutured. We are unable to do this for him today so he will go to Urgent care to get this done.

## 2018-10-10 NOTE — MR AVS SNAPSHOT
"              After Visit Summary   10/10/2018    Jeff Plasencia    MRN: [id number]           Patient Information     Date Of Birth          1923        Visit Information        Provider Department      10/10/2018 9:45 AM FL PI RN [facility]        Today's Diagnoses     Laceration of head    -  1       Follow-ups after your visit        Who to contact     If you have questions or need follow up information about today's clinic visit or your schedule please contact [facility] directly at [phone].  Normal or non-critical lab and imaging results will be communicated to you by [employer], letter or phone within 4 business days after the clinic has received the results. If you do not hear from us within 7 days, please contact the clinic through [employer] or phone. If you have a critical or abnormal lab result, we will notify you by phone as soon as possible.  Submit refill requests through [employer] or call your pharmacy and they will forward the refill request to us. Please allow 3 business days for your refill to be completed.          Additional Information About Your Visit        MyChart Information     [employer] lets you send messages to your doctor, view your test results, renew your prescriptions, schedule appointments and more. To sign up, go to [email or web address] . Click on \"Log in\" on the left side of the screen, which will take you to the Welcome page. Then click on \"Sign up Now\" on the right side of the page.     You will be asked to enter the access code listed below, as well as some personal information. Please follow the directions to create your username and password.     Your access code is: [email or web address]  Expires: 12/10/2018 12:01 PM     Your access code will  in 90 days. If you need help or a new code, please call your [facility] or [phone].        Care EveryWhere ID     This is your Care EveryWhere ID. This could be used by other organizations to " access your [place] medical records  [id number]         Blood Pressure from Last 3 Encounters:   09/11/18 (!) 149/96   06/12/18 100/52   05/22/18 98/54    Weight from Last 3 Encounters:   09/11/18 172 lb (78 kg)   06/12/18 185 lb (83.9 kg)   05/22/18 191 lb (86.6 kg)              Today, you had the following     No orders found for display       Primary Care Provider Office Phone # Fax #    Jostin Guidry MD [place] [phone]       [address] [facility] [address]        Equal Access to Services     [facility] : Hadii aad ku hadasho Soomaali, waaxda luqadaha, qaybta kaalmada adebrennayada, arpita portillo . So [facility] [phone].    ATENCIÓN: Si habla español, tiene a gabriel disposición servicios gratuitos de asistencia lingüística. Llame al [phone].    We comply with applicable federal civil rights laws and [place] laws. We do not discriminate on the basis of race, color, national origin, age, disability, sex, sexual orientation, or gender identity.            Thank you!     Thank you for choosing [facility]  for your care. Our goal is always to provide you with excellent care. Hearing back from our patients is one way we can continue to improve our services. Please take a few minutes to complete the written survey that you may receive in the mail after your visit with us. Thank you!             Your Updated Medication List - Protect others around you: Learn how to safely use, store and throw away your medicines at www.disposemymeds.org.          This list is accurate as of 10/10/18 10:04 AM.  Always use your most recent med list.                   Brand Name Dispense Instructions for use Diagnosis    ARTIFICIAL TEAR OP      Apply to eye 2 times daily        aspirin 81 MG tablet     100    1/2 tablet by mouth daily        glucosamine chondroitin 500 complex Caps      1 capsule by mouth DAILY        LUTEIN PO      Take 1 tablet by mouth daily        NASONEX  50 MCG/ACT spray   Generic drug:  mometasone     17 g    USE TWO SPRAY(S) IN EACH NOSTRIL ONCE DAILY    Other chronic rhinitis       VITAMIN D PO      Take 1,000 Units by mouth daily In Summer, 2000 units in winter

## 2019-01-01 ENCOUNTER — NURSING HOME VISIT (OUTPATIENT)
Dept: GERIATRICS | Facility: CLINIC | Age: 84
End: 2019-01-01
Payer: MEDICARE

## 2019-01-01 ENCOUNTER — OFFICE VISIT (OUTPATIENT)
Dept: DERMATOLOGY | Facility: CLINIC | Age: 84
End: 2019-01-01
Payer: MEDICARE

## 2019-01-01 ENCOUNTER — TELEPHONE (OUTPATIENT)
Dept: DERMATOLOGY | Facility: CLINIC | Age: 84
End: 2019-01-01

## 2019-01-01 ENCOUNTER — TELEPHONE (OUTPATIENT)
Dept: ORTHOPEDICS | Facility: CLINIC | Age: 84
End: 2019-01-01

## 2019-01-01 ENCOUNTER — TELEPHONE (OUTPATIENT)
Dept: FAMILY MEDICINE | Facility: CLINIC | Age: 84
End: 2019-01-01

## 2019-01-01 ENCOUNTER — OFFICE VISIT (OUTPATIENT)
Dept: ORTHOPEDICS | Facility: CLINIC | Age: 84
End: 2019-01-01
Payer: MEDICARE

## 2019-01-01 ENCOUNTER — HOSPITAL LABORATORY (OUTPATIENT)
Facility: OTHER | Age: 84
End: 2019-01-01

## 2019-01-01 ENCOUNTER — ALLIED HEALTH/NURSE VISIT (OUTPATIENT)
Dept: DERMATOLOGY | Facility: CLINIC | Age: 84
End: 2019-01-01
Payer: MEDICARE

## 2019-01-01 VITALS — HEART RATE: 76 BPM | DIASTOLIC BLOOD PRESSURE: 72 MMHG | SYSTOLIC BLOOD PRESSURE: 116 MMHG | OXYGEN SATURATION: 99 %

## 2019-01-01 VITALS
BODY MASS INDEX: 25.73 KG/M2 | DIASTOLIC BLOOD PRESSURE: 72 MMHG | HEIGHT: 72 IN | SYSTOLIC BLOOD PRESSURE: 143 MMHG | WEIGHT: 190 LBS

## 2019-01-01 VITALS
OXYGEN SATURATION: 98 % | SYSTOLIC BLOOD PRESSURE: 141 MMHG | DIASTOLIC BLOOD PRESSURE: 69 MMHG | BODY MASS INDEX: 26.54 KG/M2 | HEART RATE: 82 BPM | RESPIRATION RATE: 16 BRPM | TEMPERATURE: 97.8 F | WEIGHT: 193 LBS

## 2019-01-01 VITALS
DIASTOLIC BLOOD PRESSURE: 87 MMHG | OXYGEN SATURATION: 100 % | SYSTOLIC BLOOD PRESSURE: 150 MMHG | HEART RATE: 82 BPM | WEIGHT: 193 LBS | RESPIRATION RATE: 18 BRPM | BODY MASS INDEX: 26.54 KG/M2 | TEMPERATURE: 98.2 F

## 2019-01-01 VITALS — RESPIRATION RATE: 12 BRPM | HEART RATE: 66 BPM

## 2019-01-01 DIAGNOSIS — D18.00 ANGIOMA: ICD-10-CM

## 2019-01-01 DIAGNOSIS — D23.9 DERMAL NEVUS: ICD-10-CM

## 2019-01-01 DIAGNOSIS — M17.0 PRIMARY OSTEOARTHRITIS OF BOTH KNEES: Primary | ICD-10-CM

## 2019-01-01 DIAGNOSIS — R62.7 ADULT FAILURE TO THRIVE: ICD-10-CM

## 2019-01-01 DIAGNOSIS — Z51.5 HOSPICE CARE PATIENT: ICD-10-CM

## 2019-01-01 DIAGNOSIS — I10 ESSENTIAL HYPERTENSION: ICD-10-CM

## 2019-01-01 DIAGNOSIS — Z91.81 PERSONAL HISTORY OF FALL: ICD-10-CM

## 2019-01-01 DIAGNOSIS — Z87.81 STATUS POST CLOSED FRACTURE OF RIGHT HIP: Primary | ICD-10-CM

## 2019-01-01 DIAGNOSIS — M17.0 PRIMARY OSTEOARTHRITIS OF BOTH KNEES: ICD-10-CM

## 2019-01-01 DIAGNOSIS — E78.5 HYPERLIPIDEMIA LDL GOAL <130: ICD-10-CM

## 2019-01-01 DIAGNOSIS — R29.6 RECURRENT FALLS: Primary | ICD-10-CM

## 2019-01-01 DIAGNOSIS — L82.1 SEBORRHEIC KERATOSIS: ICD-10-CM

## 2019-01-01 DIAGNOSIS — R41.3 MEMORY LOSS: ICD-10-CM

## 2019-01-01 DIAGNOSIS — S06.5XAA SDH (SUBDURAL HEMATOMA) (H): ICD-10-CM

## 2019-01-01 DIAGNOSIS — K59.01 SLOW TRANSIT CONSTIPATION: ICD-10-CM

## 2019-01-01 DIAGNOSIS — L81.4 LENTIGO: ICD-10-CM

## 2019-01-01 DIAGNOSIS — Z48.01 ENCOUNTER FOR CHANGE OR REMOVAL OF SURGICAL WOUND DRESSING: Primary | ICD-10-CM

## 2019-01-01 DIAGNOSIS — C44.01 BASAL CELL CARCINOMA (BCC) OF SKIN OF LIP: Primary | ICD-10-CM

## 2019-01-01 LAB
ALBUMIN SERPL-MCNC: 2.7 G/DL (ref 3.4–5)
ALP SERPL-CCNC: 51 U/L (ref 40–150)
ALT SERPL W P-5'-P-CCNC: 22 U/L (ref 0–70)
ANION GAP SERPL CALCULATED.3IONS-SCNC: 5 MMOL/L (ref 3–14)
AST SERPL W P-5'-P-CCNC: 30 U/L (ref 0–45)
BILIRUB SERPL-MCNC: 0.7 MG/DL (ref 0.2–1.3)
BUN SERPL-MCNC: 27 MG/DL (ref 7–30)
CALCIUM SERPL-MCNC: 8.4 MG/DL (ref 8.5–10.1)
CHLORIDE SERPL-SCNC: 102 MMOL/L (ref 94–109)
CO2 SERPL-SCNC: 30 MMOL/L (ref 20–32)
CREAT SERPL-MCNC: 0.97 MG/DL (ref 0.66–1.25)
ERYTHROCYTE [DISTWIDTH] IN BLOOD BY AUTOMATED COUNT: 13.8 % (ref 10–15)
GFR SERPL CREATININE-BSD FRML MDRD: 66 ML/MIN/{1.73_M2}
GLUCOSE SERPL-MCNC: 132 MG/DL (ref 70–99)
HCT VFR BLD AUTO: 34 % (ref 40–53)
HGB BLD-MCNC: 10.4 G/DL (ref 13.3–17.7)
MCH RBC QN AUTO: 31 PG (ref 26.5–33)
MCHC RBC AUTO-ENTMCNC: 30.6 G/DL (ref 31.5–36.5)
MCV RBC AUTO: 102 FL (ref 78–100)
PLATELET # BLD AUTO: 291 10E9/L (ref 150–450)
POTASSIUM SERPL-SCNC: 3.7 MMOL/L (ref 3.4–5.3)
PROT SERPL-MCNC: 6.4 G/DL (ref 6.8–8.8)
RBC # BLD AUTO: 3.35 10E12/L (ref 4.4–5.9)
SODIUM SERPL-SCNC: 137 MMOL/L (ref 133–144)
WBC # BLD AUTO: 6.7 10E9/L (ref 4–11)

## 2019-01-01 PROCEDURE — 99310 SBSQ NF CARE HIGH MDM 45: CPT | Mod: GV | Performed by: NURSE PRACTITIONER

## 2019-01-01 PROCEDURE — 99207 ZZC NO CHARGE NURSE ONLY: CPT

## 2019-01-01 PROCEDURE — 99316 NF DSCHRG MGMT 30 MIN+: CPT | Performed by: NURSE PRACTITIONER

## 2019-01-01 PROCEDURE — 20611 DRAIN/INJ JOINT/BURSA W/US: CPT | Mod: 50 | Performed by: FAMILY MEDICINE

## 2019-01-01 PROCEDURE — 99213 OFFICE O/P EST LOW 20 MIN: CPT | Mod: 25 | Performed by: FAMILY MEDICINE

## 2019-01-01 PROCEDURE — 13152 CMPLX RPR E/N/E/L 2.6-7.5 CM: CPT | Mod: 51 | Performed by: DERMATOLOGY

## 2019-01-01 PROCEDURE — 17311 MOHS 1 STAGE H/N/HF/G: CPT | Performed by: DERMATOLOGY

## 2019-01-01 PROCEDURE — 99203 OFFICE O/P NEW LOW 30 MIN: CPT | Mod: 25 | Performed by: DERMATOLOGY

## 2019-01-01 PROCEDURE — 99310 SBSQ NF CARE HIGH MDM 45: CPT | Performed by: NURSE PRACTITIONER

## 2019-01-01 RX ORDER — E-LYTES/CARBOXYMETHYLCELLULOSE
2 AEROSOL, SPRAY WITH PUMP (ML) MUCOUS MEMBRANE
COMMUNITY

## 2019-01-01 RX ORDER — MORPHINE SULFATE 20 MG/ML
5 SOLUTION ORAL EVERY 4 HOURS
COMMUNITY

## 2019-01-01 RX ORDER — MOMETASONE FUROATE MONOHYDRATE 50 UG/1
2 SPRAY, METERED NASAL DAILY PRN
Start: 2019-01-01 | End: 2019-01-01

## 2019-01-01 RX ORDER — BISACODYL 10 MG
10 SUPPOSITORY, RECTAL RECTAL DAILY PRN
COMMUNITY

## 2019-01-01 RX ORDER — HYOSCYAMINE SULFATE 0.12 MG/ML
0.12 LIQUID ORAL EVERY 4 HOURS PRN
COMMUNITY

## 2019-01-01 RX ORDER — OXYCODONE HYDROCHLORIDE 5 MG/1
1-2 CAPSULE ORAL EVERY 4 HOURS PRN
COMMUNITY
End: 2019-01-01

## 2019-01-01 RX ORDER — MORPHINE SULFATE 20 MG/ML
5 SOLUTION ORAL
COMMUNITY

## 2019-01-01 RX ORDER — HALOPERIDOL 2 MG/ML
2.5 SOLUTION ORAL EVERY 4 HOURS PRN
COMMUNITY

## 2019-01-01 RX ORDER — OLANZAPINE 5 MG/1
5 TABLET ORAL EVERY 6 HOURS PRN
COMMUNITY
End: 2019-01-01

## 2019-01-01 RX ORDER — ACETAMINOPHEN 325 MG/1
325-650 TABLET ORAL EVERY 4 HOURS PRN
COMMUNITY
End: 2019-01-01

## 2019-01-01 RX ORDER — POLYETHYLENE GLYCOL 3350 17 G/17G
17 POWDER, FOR SOLUTION ORAL DAILY
COMMUNITY
End: 2019-01-01

## 2019-01-01 ASSESSMENT — MIFFLIN-ST. JEOR: SCORE: 1526.89

## 2019-01-16 NOTE — PROGRESS NOTES
Surgical Office Location :   [facility]  [address] [place] [address]

## 2019-01-21 NOTE — LETTER
1/21/2019         RE: Jeff Plasencia  [address] [facility] [address]        Dear Colleague,    Thank you for referring your patient, Jeff Plasencia, to the [facility]. Please see a copy of my visit note below.    Surgical Office Location :   [facility]  [address] [place] [address]      Jeff Plasencia , a 89 year old year old male patient, I was asked to see by Mary Ellen Rivas PA-C for basal cell carcinoma on left upper lip.  Patient states this has been present for months.  Patient reports the following symptoms:  bleeding .  Patient reports the following previous treatments none.  Patient reports the following modifying factors none.  Associated symptoms: none.  Patient has no other skin complaints today.  Remainder of the HPI, Meds, PMH, Allergies, FH, and SH was reviewed in chart.      Past Medical History:   Diagnosis Date     Basal cell carcinoma      BENIGN ESSENTIAL TREMOR     Did not tolerate propranolol     Calculus of kidney      Herpes zoster without mention of complication     Right T7-10 2003     Impotence of organic origin      Parotid mass 3/26/2009    CT 3/09- 2.6x 2.8 x2.9 cm Soft tissue mass within the left parotid gland. Biopsies negative.      Undiagnosed cardiac murmurs 2/9/2007    Echo 10/08 normal.        Past Surgical History:   Procedure Laterality Date     COLONOSCOPY  9/28/2011    Procedure:COLONOSCOPY; Colonoscopy  ; Surgeon:LAMAR ALLEN; Location:[facility]     EYE SURGERY       GENITOURINARY SURGERY       HERNIA REPAIR       REPAIR ECTROPION Right 4/12/2018    Procedure: REPAIR ECTROPION;  RIGHT LOWER LID ECTROPION REPAIR WITH SKIN GRAFT ;  Surgeon: aGrry Ortiz MD;  [facility]     SURGICAL HISTORY OF -   11/1999    Right cataract surgery     SURGICAL HISTORY OF -   12/1999    Hernia repair     SURGICAL HISTORY OF -   2001    Normal Colonoscopy     SURGICAL HISTORY OF -   8/2002    Cryosurgery of the prostate      SURGICAL HISTORY OF -   4/2003    Left cataract surgery        Family History   Problem Relation Age of Onset     Hypertension Brother      Diabetes Mother         age 70s     Cerebrovascular Disease Sister      Cancer - colorectal No family hx of      Melanoma No family hx of        Social History     Socioeconomic History     Marital status:      Spouse name: Not on file     Number of children: Not on file     Years of education: Not on file     Highest education level: Not on file   Social Needs     Financial resource strain: Not on file     Food insecurity - worry: Not on file     Food insecurity - inability: Not on file     Transportation needs - medical: Not on file     Transportation needs - non-medical: Not on file   Occupational History     Occupation: Construction/Farming     Employer: RETIRED   Tobacco Use     Smoking status: Never Smoker     Smokeless tobacco: Never Used   Substance and Sexual Activity     Alcohol use: Yes     Comment: socially     Drug use: No     Sexual activity: No   Other Topics Concern     Parent/sibling w/ CABG, MI or angioplasty before 65F 55M? Not Asked   Social History Narrative     Not on file       Outpatient Encounter Medications as of 1/21/2019   Medication Sig Dispense Refill     ARTIFICIAL TEAR OP Apply to eye 2 times daily       ASPIRIN 81 MG OR TABS 1/2 tablet by mouth daily 100 3     GLUCOSAMINE CHONDR 500 COMPLEX OR CAPS 1 capsule by mouth DAILY  0     LUTEIN PO Take 1 tablet by mouth daily        VITAMIN D PO Take 1,000 Units by mouth daily In Summer, 2000 units in winter       NASONEX 50 MCG/ACT spray USE TWO SPRAY(S) IN EACH NOSTRIL ONCE DAILY (Patient not taking: Reported on 6/12/2018) 17 g 11     No facility-administered encounter medications on file as of 1/21/2019.              Review Of Systems  Skin: As above  Eyes: negative  Ears/Nose/Throat: negative  Respiratory: No shortness of breath, dyspnea on exertion, cough, or hemoptysis  Cardiovascular:  negative  Gastrointestinal: negative  Genitourinary: negative  Musculoskeletal: negative  Neurologic: negative  Psychiatric: negative  Hematologic/Lymphatic/Immunologic: negative  Endocrine: negative      O:   NAD, WDWN, Alert & Oriented, Mood & Affect wnl, Vitals stable   Here today alone   /72   Pulse 76   SpO2 99%    General appearance pedro pablo ii   Vitals stable   Alert, oriented and in no acute distress      Following lymph nodes palpated: Occipital, Cervical, Supraclavicular no lad   L upepr lip ill-deifned 0.9cm red scaly papule      Stuck on papules and brown macules on trunk and ext    Red papules on trunk   Flesh colored papules on trunk      The remainder of the full exam was unremarkable; the following areas were examined:  conjunctiva/lids, oral mucosa, neck, peripheral vascular system, abdomen, lymph nodes, digits/nails, eccrine and apocrine glands, scalp/hair, face, neck, chest, abdomen, buttocks, back, RUE, LUE, RLE, LLE       Eyes: Conjunctivae/lids:Normal     ENT: Lips, buccal mucosa, tongue: normal    MSK:Normal    Cardiovascular: peripheral edema none    Pulm: Breathing Normal    Lymph Nodes: No Head and Neck Lymphadenopathy     Neuro/Psych: Orientation:Normal; Mood/Affect:Normal      A/P:  1. Basal cell carcinoma lip  2. Seborrheic keratosis, letnigo, angioma, dermal nevus  BENIGN LESIONS DISCUSSED WITH PATIENT:  I discussed the specifics of tumor, prognosis, and genetics of benign lesions.  I explained that treatment of these lesions would be purely cosmetic and not medically neccessary.  I discussed with patient different removal options including excision, cautery and /or laser.      Nature and genetics of benign skin lesions dicussed with patient.  Signs and Symptoms of skin cancer discussed with patient.  ABCDEs of melanoma reviewed with patient.  Patient encouraged to perform monthly skin exams.  UV precautions reviewed with patient.  Patient to follow up with Primary Care provider  regarding elevated blood pressure.    Skin care regimen reviewed with patient: Eliminate harsh soaps, i.e. Dial, zest, irsih spring; Mild soaps such as Cetaphil or Dove sensitive skin, avoid hot or cold showers, aggressive use of emollients including vanicream, cetaphil or cerave discussed with patient.    Risks of non-melanoma skin cancer discussed with patient   Return to clinic 6 months    PROCEDURE NOTE  L upper lip basal cell carcinoma   MOHS:   Location    After PGACAC discussed with patient, decision for Mohs surgery was made. Indication for Mohs was Location. Patient confirmed the site with Dr. Gomez.  After anesthesia with LEC, the tumor was excised using standard Mohs technique in 1 stages(s).  CLEAR MARGINS OBTAINED and Final defect size was 1.4 x 1.2 cm.     REPAIR COMPLEX: Because of the tightness of the surrounding skin and Because of the size and full thickness nature of the defect, a complex closure was planned. After LEC anesthesia and prep, Burow's triangles were excised in the relaxed skin tension lines. The wound edges were widely undermined by dissection in the subcutaneous plane until adequate tissue mobility was obtained. Hemostasis was obtained. The wound edges were closed in a layered fashion using Vicryl and Fast Absorbing Plain Gut sutures. Postoperative length was 4.3 cm.   EBL minimal; complications none; wound care routine.  The patient was discharged in good condition and will return in one week for wound evaluation.            Again, thank you for allowing me to participate in the care of your patient.        Sincerely,        Griffin Gomez MD

## 2019-01-21 NOTE — PROGRESS NOTES
Jeff Plasencia , a 89 year old year old male patient, I was asked to see by Mary Ellen Rivas PA-C for basal cell carcinoma on left upper lip.  Patient states this has been present for months.  Patient reports the following symptoms:  bleeding .  Patient reports the following previous treatments none.  Patient reports the following modifying factors none.  Associated symptoms: none.  Patient has no other skin complaints today.  Remainder of the HPI, Meds, PMH, Allergies, FH, and SH was reviewed in chart.      Past Medical History:   Diagnosis Date     Basal cell carcinoma      BENIGN ESSENTIAL TREMOR     Did not tolerate propranolol     Calculus of kidney      Herpes zoster without mention of complication     Right T7-10 2003     Impotence of organic origin      Parotid mass 3/26/2009    CT 3/09- 2.6x 2.8 x2.9 cm Soft tissue mass within the left parotid gland. Biopsies negative.      Undiagnosed cardiac murmurs 2/9/2007    Echo 10/08 normal.        Past Surgical History:   Procedure Laterality Date     COLONOSCOPY  9/28/2011    Procedure:COLONOSCOPY; Colonoscopy  ; Surgeon:LAMAR ALLEN; Location:[facility]     EYE SURGERY       GENITOURINARY SURGERY       HERNIA REPAIR       REPAIR ECTROPION Right 4/12/2018    Procedure: REPAIR ECTROPION;  RIGHT LOWER LID ECTROPION REPAIR WITH SKIN GRAFT ;  Surgeon: Garry Ortiz MD;  [facility]     SURGICAL HISTORY OF -   11/1999    Right cataract surgery     SURGICAL HISTORY OF -   12/1999    Hernia repair     SURGICAL HISTORY OF -   2001    Normal Colonoscopy     SURGICAL HISTORY OF -   8/2002    Cryosurgery of the prostate     SURGICAL HISTORY OF -   4/2003    Left cataract surgery        Family History   Problem Relation Age of Onset     Hypertension Brother      Diabetes Mother         age 70s     Cerebrovascular Disease Sister      Cancer - colorectal No family hx of      Melanoma No family hx of        Social History     Socioeconomic History     Marital status:       Spouse name: Not on file     Number of children: Not on file     Years of education: Not on file     Highest education level: Not on file   Social Needs     Financial resource strain: Not on file     Food insecurity - worry: Not on file     Food insecurity - inability: Not on file     Transportation needs - medical: Not on file     Transportation needs - non-medical: Not on file   Occupational History     Occupation: Construction/Farming     Employer: RETIRED   Tobacco Use     Smoking status: Never Smoker     Smokeless tobacco: Never Used   Substance and Sexual Activity     Alcohol use: Yes     Comment: socially     Drug use: No     Sexual activity: No   Other Topics Concern     Parent/sibling w/ CABG, MI or angioplasty before 65F 55M? Not Asked   Social History Narrative     Not on file       Outpatient Encounter Medications as of 1/21/2019   Medication Sig Dispense Refill     ARTIFICIAL TEAR OP Apply to eye 2 times daily       ASPIRIN 81 MG OR TABS 1/2 tablet by mouth daily 100 3     GLUCOSAMINE CHONDR 500 COMPLEX OR CAPS 1 capsule by mouth DAILY  0     LUTEIN PO Take 1 tablet by mouth daily        VITAMIN D PO Take 1,000 Units by mouth daily In Summer, 2000 units in winter       NASONEX 50 MCG/ACT spray USE TWO SPRAY(S) IN EACH NOSTRIL ONCE DAILY (Patient not taking: Reported on 6/12/2018) 17 g 11     No facility-administered encounter medications on file as of 1/21/2019.              Review Of Systems  Skin: As above  Eyes: negative  Ears/Nose/Throat: negative  Respiratory: No shortness of breath, dyspnea on exertion, cough, or hemoptysis  Cardiovascular: negative  Gastrointestinal: negative  Genitourinary: negative  Musculoskeletal: negative  Neurologic: negative  Psychiatric: negative  Hematologic/Lymphatic/Immunologic: negative  Endocrine: negative      O:   NAD, WDWN, Alert & Oriented, Mood & Affect wnl, Vitals stable   Here today alone   /72   Pulse 76   SpO2 99%    General appearance  pedro pablo ii   Vitals stable   Alert, oriented and in no acute distress      Following lymph nodes palpated: Occipital, Cervical, Supraclavicular no lad   L upepr lip ill-deifned 0.9cm red scaly papule      Stuck on papules and brown macules on trunk and ext    Red papules on trunk   Flesh colored papules on trunk      The remainder of the full exam was unremarkable; the following areas were examined:  conjunctiva/lids, oral mucosa, neck, peripheral vascular system, abdomen, lymph nodes, digits/nails, eccrine and apocrine glands, scalp/hair, face, neck, chest, abdomen, buttocks, back, RUE, LUE, RLE, LLE       Eyes: Conjunctivae/lids:Normal     ENT: Lips, buccal mucosa, tongue: normal    MSK:Normal    Cardiovascular: peripheral edema none    Pulm: Breathing Normal    Lymph Nodes: No Head and Neck Lymphadenopathy     Neuro/Psych: Orientation:Normal; Mood/Affect:Normal      A/P:  1. Basal cell carcinoma lip  2. Seborrheic keratosis, letnigo, angioma, dermal nevus  BENIGN LESIONS DISCUSSED WITH PATIENT:  I discussed the specifics of tumor, prognosis, and genetics of benign lesions.  I explained that treatment of these lesions would be purely cosmetic and not medically neccessary.  I discussed with patient different removal options including excision, cautery and /or laser.      Nature and genetics of benign skin lesions dicussed with patient.  Signs and Symptoms of skin cancer discussed with patient.  ABCDEs of melanoma reviewed with patient.  Patient encouraged to perform monthly skin exams.  UV precautions reviewed with patient.  Patient to follow up with Primary Care provider regarding elevated blood pressure.    Skin care regimen reviewed with patient: Eliminate harsh soaps, i.e. Dial, zest, irsih spring; Mild soaps such as Cetaphil or Dove sensitive skin, avoid hot or cold showers, aggressive use of emollients including vanicream, cetaphil or cerave discussed with patient.    Risks of non-melanoma skin cancer discussed  with patient   Return to clinic 6 months    PROCEDURE NOTE  L upper lip basal cell carcinoma   MOHS:   Location    After PGACAC discussed with patient, decision for Mohs surgery was made. Indication for Mohs was Location. Patient confirmed the site with Dr. Gomez.  After anesthesia with LEC, the tumor was excised using standard Mohs technique in 1 stages(s).  CLEAR MARGINS OBTAINED and Final defect size was 1.4 x 1.2 cm.     REPAIR COMPLEX: Because of the tightness of the surrounding skin and Because of the size and full thickness nature of the defect, a complex closure was planned. After LEC anesthesia and prep, Burow's triangles were excised in the relaxed skin tension lines. The wound edges were widely undermined by dissection in the subcutaneous plane until adequate tissue mobility was obtained. Hemostasis was obtained. The wound edges were closed in a layered fashion using Vicryl and Fast Absorbing Plain Gut sutures. Postoperative length was 4.3 cm.   EBL minimal; complications none; wound care routine.  The patient was discharged in good condition and will return in one week for wound evaluation.

## 2019-01-21 NOTE — NURSING NOTE
"Initial /72   Pulse 76   SpO2 99%  Estimated body mass index is 23.65 kg/m  as calculated from the following:    Height as of 9/11/18: 1.816 m (5' 11.5\").    Weight as of 9/11/18: 78 kg (172 lb). .    Gissell Clay LPN    "

## 2019-01-21 NOTE — PATIENT INSTRUCTIONS
Sutured Wound Care     [facility]: [phone]    [facility]: [phone]    Upper lip      ? No strenuous activity for 48 hours. Resume moderate activity in 48 hours. No heavy exercising until you are seen for follow up in one week.     ? Take Tylenol as needed for discomfort.                         ? Do not drink alcoholic beverages for 48 hours.     ? Keep the pressure bandage in place for 24 hours. If the bandage becomes blood tinged or loose, reinforce it with gauze and tape.        (Refer to the reverse side of this page for management of bleeding).    ? Remove pressure bandage in 24 hours     ? Leave the flat bandage in place until your follow up appointment.    ? Keep the bandage dry. Wash around it carefully.    ? If the tape becomes soiled or starts to come off, reinforce it with additional paper tape.    ? Do not smoke for 3 weeks; smoking is detrimental to wound healing.    ? It is normal to have swelling and bruising around the surgical site. The bruising will fade in approximately 10-14 days. Elevate the area to reduce swelling.    ? Numbness, itchiness and sensitivity to temperature changes can occur after surgery and may take up to 18 months to normalize.      POSSIBLE COMPLICATIONS    BLEEDIN. Leave the bandage in place.  2. Use tightly rolled up gauze or a cloth to apply direct pressure over the bandage for 20   minutes.  3. Reapply pressure for an additional 20 minutes if necessary  4. Call the office or go to the nearest emergency room if pressure fails to stop the bleeding.  5. Use additional gauze and tape to maintain pressure once the bleeding has stopped.        PAIN:    1. Post operative pain should slowly get better, never worse.  2. A severe increase in pain may indicate a problem. Call the office if this occurs.    In case of emergency phone:Dr Gomez [phone]

## 2019-01-24 NOTE — TELEPHONE ENCOUNTER
Reason for Call:  Other call back    Detailed comments: pt calling stating he had mohs done on Monday. This morning his bandage was coming off. Would like to know if he should go into the clinic in [place] to have someone re bandage it.     Phone Number Patient can be reached at: Home number on file [phone] (home)    Best Time: any     Can we leave a detailed message on this number? YES    Call taken on 1/24/2019 at 9:47 AM by Inge Owusu

## 2019-01-24 NOTE — TELEPHONE ENCOUNTER
Spoke to [facility] RN and explained what to expect to see s/p Mohs surgery, she will call patient and change bandage for him.     RN should:     1.) Remove old bandage (Steri strips over wound, paper tape over steri strips)     2.) Clean skin with a qtip or gauze pad and saline, pat dry.     3.) Reapply steri strips (any size) cut to size and then cover with paper tape.     Patient should still keep scheduled bandage change appointment in Dermatology clinic on M 1-28-19.     Paola Hilario RN

## 2019-01-24 NOTE — TELEPHONE ENCOUNTER
Patient will come to [facility] at 1pm for dressing change.  Discussed plan with RAN Guevara in dermatology clinic.    Awilda Hong RN

## 2019-01-28 NOTE — PATIENT INSTRUCTIONS

## 2019-01-28 NOTE — PROGRESS NOTES
Pt returned to clinic for post surgery 1 week follow up bandage change. Pt has no complaints, denies pain. Bandage removed upper lip, area cleansed with normal saline. Site is healing and wound edges approximating well. Reapplied new steri strips and paper tape.    Also, cleaned up a spot on the patient's forehead and put Aquaphor and a bandage on there for him. Patient stated that he fell on his steps at home earlier today.    Advised to watch for signs/sx of infection; spreading redness, drainage, odor, fever. Call or report promptly to clinic. Pt given written instructions and informed to rtc as needed. Patient verbalized understanding.     Nikolay URIOSTEGUI, CMA

## 2019-03-25 NOTE — LETTER
"    3/25/2019        RE: Jeff Plasencia  [address] [facility] [place] [address]        [facility]  PRIMARY CARE PROVIDER AND CLINIC:  Mary Ellen Rivas, [address] / [place] [address]  Chief Complaint   Patient presents with     Hospital F/U     [facility] Medical Record Number:  [id number]  Place of Service where encounter took place:  [facility] [[id number]]    Jeff Plasencia  is a 89 year old  (5/20/1923), admitted to the above facility from  [facility]. Hospital stay 3/19/19 through 3/23/19..  Admitted to this facility for  rehab, medical management and nursing care.    HPI:    HPI information obtained from: facility chart records, facility staff, patient report, [facility] chart review and Care Everywhere [facility] chart review.     Seeing patient for TCU admission. He sustained a right hip fracture after a fall. He was hospitalized 3/19-3/23. He had a right hemiarthroplasty with posterior approach. He has had minimal post op pain and reports he has not need pain medication since admission to TCU.     Patient has no hx of CAD. States he is no longer on antihypertensive meds due to low blood pressure. He reports balance issues that started 9 years ago after a fall. States he has \"vertebrae out\" in his neck. Per review of records appears patient has had a significant work up for this including brain imaging and labs with no obvious reason for balance/ dizzy concerns.     Patient reports appetite is good. Did have loose stools following surgery. States this was due to stool softeners.     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 He also has no past medical history of Complication of anesthesia, Malignant hyperthermia, Malignant melanoma (H), PONV (postoperative nausea and vomiting), or Squamous cell carcinoma.  PAST SURGICAL HISTORY:   has a past surgical history that includes surgical history of -  (11/1999); surgical history of -  (12/1999); surgical history of -  (2001); surgical history of -  (8/2002); surgical history of -  (4/2003); Colonoscopy (9/28/2011); hernia repair; Abdomen surgery; Eye surgery; and Repair ectropion (Right, 4/12/2018).  FAMILY HISTORY: family history includes Cerebrovascular Disease in his sister; Diabetes in his mother; Hypertension in his brother.  SOCIAL HISTORY:   reports that  has never smoked. he has never used smokeless tobacco. He reports that he drinks alcohol. He reports that he does not use drugs.    Post Discharge Medication Reconciliation Status: discharge medications reconciled, continue medications without change    Current Outpatient Medications   Medication Sig Dispense Refill     ARTIFICIAL TEAR OP Apply to eye 2 times daily       ASPIRIN 81 MG OR TABS 1/2 tablet by mouth daily 100 3     GLUCOSAMINE CHONDR 500 COMPLEX OR CAPS 1 capsule by mouth DAILY  0     LUTEIN PO Take 1 tablet by mouth daily        NASONEX 50 MCG/ACT spray USE TWO SPRAY(S) IN EACH NOSTRIL ONCE DAILY (Patient not taking: Reported on 6/12/2018) 17 g 11     VITAMIN D PO Take 1,000 Units by mouth daily In Summer, 2000 units in winter         ROS:  10 point ROS of systems including Constitutional, Eyes, Respiratory, Cardiovascular, Gastroenterology, Genitourinary, Integumentary, Musculoskeletal, Psychiatric were all negative except for pertinent positives noted in my HPI.    Vitals:  /69   Pulse 82   Temp 97.8  F (36.6  C)   Resp 16   Wt 87.5 kg (193 lb)   SpO2 98%   BMI 26.54 kg/m      Exam:  GENERAL APPEARANCE:  Alert, in no distress  RESP:  respiratory effort and palpation of chest normal, auscultation of lungs clear , no respiratory distress  CV:  Palpation and auscultation of heart done , rate and rhythm regular, trace RLE edema  ABDOMEN:  normal bowel sounds, soft, nontender, no hepatosplenomegaly or other masses  M/S:   Gait and station not assessed, Digits and nails no concerns  SKIN:  Inspection and Palpation of skin and subcutaneous tissue no rashes or lesions to exposed skin  PSYCH:  insight and judgement, memory intact , affect and mood normal      Lab/Diagnostic data:  Recent labs in [facility] reviewed by me today.     ASSESSMENT/PLAN:  Current [facility] scheduled appointments:  Next 5 appointments (look out 90 days)    Mar 28, 2019  2:00 PM CDT  Return Visit with Tom Duke DO  [facility] ([facility]) [address]  [address]  [facility] [address] [phone]         Status post closed fracture of right hip  Personal history of fall  - fall on 3/19/19 with right hip fx  - right hemiarthroplasty with posterior approach on 3/19/19  - post of hgb 12.7, on asa  - minimal post op pain. Will work with therapy in TCU    Essential hypertension  - controlled on no antihypertensive meds    Hyperlipidemia LDL goal <130  - hx of myalgia with statin.   - no surveillance necessary due to advanced age    Memory loss  - per records  - will have OT eval to ensure patient safe to return home alone    Primary osteoarthritis of both knees  - hx of knee pain. Steroid injections about 2 years ago  - patient concerned about knee pain with therapy  - staff to update with concerns. Patient has oxycodone and APAP available.        transcribed by : Ana Harper    CBC, CMP Dx S/P hip fx    Total time spent with patient visit at the skilled nursing facility was 35 min including patient visit and review of past records. Greater than  50% of total time spent with counseling and coordinating care due to review of hx and interview  Electronically signed by:  VENECIA Dela Cruz CNP                       Sincerely,        VENECIA Dela Cruz CNP

## 2019-03-25 NOTE — PROGRESS NOTES
"[facility]  PRIMARY CARE PROVIDER AND CLINIC:  Mary Ellen Rivas, [facility] [address] / [place] [address]  Chief Complaint   Patient presents with     Hospital F/U     [facility] Medical Record Number:  [id number]  Place of Service where encounter took place:  [facility] [[id number]]    Jeff Plasencia  is a 89 year old  (5/20/1923), admitted to the above facility from  [facility]. Hospital stay 3/19/19 through 3/23/19..  Admitted to this facility for  rehab, medical management and nursing care.    HPI:    HPI information obtained from: facility chart records, facility staff, patient report, [facility] chart review and Care Everywhere [facility] chart review.     Seeing patient for TCU admission. He sustained a right hip fracture after a fall. He was hospitalized 3/19-3/23. He had a right hemiarthroplasty with posterior approach. He has had minimal post op pain and reports he has not need pain medication since admission to TCU.     Patient has no hx of CAD. States he is no longer on antihypertensive meds due to low blood pressure. He reports balance issues that started 9 years ago after a fall. States he has \"vertebrae out\" in his neck. Per review of records appears patient has had a significant work up for this including brain imaging and labs with no obvious reason for balance/ dizzy concerns.     Patient reports appetite is good. Did have loose stools following surgery. States this was due to stool softeners.     CODE STATUS/ADVANCE DIRECTIVES DISCUSSION:   DNR / DNI  Patient's living condition: lives alone  ALLERGIES: Mustard oil; Penicillins; Atenolol; Cozaar [losartan potassium]; Inderal [propranolol hcl]; and Lisinopril  PAST MEDICAL HISTORY:  has a past medical history of Basal cell carcinoma, BENIGN ESSENTIAL TREMOR, Calculus of kidney, Herpes zoster without mention of complication, Impotence of organic origin, Parotid mass (3/26/2009), and " Undiagnosed cardiac murmurs (2/9/2007). He also has no past medical history of Complication of anesthesia, Malignant hyperthermia, Malignant melanoma (H), PONV (postoperative nausea and vomiting), or Squamous cell carcinoma.  PAST SURGICAL HISTORY:   has a past surgical history that includes surgical history of -  (11/1999); surgical history of -  (12/1999); surgical history of -  (2001); surgical history of -  (8/2002); surgical history of -  (4/2003); Colonoscopy (9/28/2011); hernia repair; Abdomen surgery; Eye surgery; and Repair ectropion (Right, 4/12/2018).  FAMILY HISTORY: family history includes Cerebrovascular Disease in his sister; Diabetes in his mother; Hypertension in his brother.  SOCIAL HISTORY:   reports that  has never smoked. he has never used smokeless tobacco. He reports that he drinks alcohol. He reports that he does not use drugs.    Post Discharge Medication Reconciliation Status: discharge medications reconciled, continue medications without change    Current Outpatient Medications   Medication Sig Dispense Refill     ARTIFICIAL TEAR OP Apply to eye 2 times daily       ASPIRIN 81 MG OR TABS 1/2 tablet by mouth daily 100 3     GLUCOSAMINE CHONDR 500 COMPLEX OR CAPS 1 capsule by mouth DAILY  0     LUTEIN PO Take 1 tablet by mouth daily        NASONEX 50 MCG/ACT spray USE TWO SPRAY(S) IN EACH NOSTRIL ONCE DAILY (Patient not taking: Reported on 6/12/2018) 17 g 11     VITAMIN D PO Take 1,000 Units by mouth daily In Summer, 2000 units in winter         ROS:  10 point ROS of systems including Constitutional, Eyes, Respiratory, Cardiovascular, Gastroenterology, Genitourinary, Integumentary, Musculoskeletal, Psychiatric were all negative except for pertinent positives noted in my HPI.    Vitals:  /69   Pulse 82   Temp 97.8  F (36.6  C)   Resp 16   Wt 87.5 kg (193 lb)   SpO2 98%   BMI 26.54 kg/m    Exam:  GENERAL APPEARANCE:  Alert, in no distress  RESP:  respiratory effort and palpation of  chest normal, auscultation of lungs clear , no respiratory distress  CV:  Palpation and auscultation of heart done , rate and rhythm regular, trace RLE edema  ABDOMEN:  normal bowel sounds, soft, nontender, no hepatosplenomegaly or other masses  M/S:   Gait and station not assessed, Digits and nails no concerns  SKIN:  Inspection and Palpation of skin and subcutaneous tissue no rashes or lesions to exposed skin  PSYCH:  insight and judgement, memory intact , affect and mood normal      Lab/Diagnostic data:  Recent labs in [facility] reviewed by me today.     ASSESSMENT/PLAN:  Current [place] scheduled appointments:  Next 5 appointments (look out 90 days)    Mar 28, 2019  2:00 PM CDT  Return Visit with Tom Duke DO  [facility] ([facility]) [address]  [address]  [facility] [address] [phone]         Status post closed fracture of right hip  Personal history of fall  - fall on 3/19/19 with right hip fx  - right hemiarthroplasty with posterior approach on 3/19/19  - post of hgb 12.7, on asa  - minimal post op pain. Will work with therapy in TCU    Essential hypertension  - controlled on no antihypertensive meds    Hyperlipidemia LDL goal <130  - hx of myalgia with statin.   - no surveillance necessary due to advanced age    Memory loss  - per records  - will have OT eval to ensure patient safe to return home alone    Primary osteoarthritis of both knees  - hx of knee pain. Steroid injections about 2 years ago  - patient concerned about knee pain with therapy  - staff to update with concerns. Patient has oxycodone and APAP available.        transcribed by : Ana Harper    CBC, CMP Dx S/P hip fx    Total time spent with patient visit at the skilled nursing facility was 35 min including patient visit and review of past records. Greater than 50% of total time spent with counseling and coordinating care due to review of hx and  interview  Electronically signed by:  VENECIA Dela Cruz CNP

## 2019-03-27 NOTE — TELEPHONE ENCOUNTER
Travis -Son calling to ask if it's going to be ok for Dyer to have the injections in his knees tomorrow since he just had hip surgery last week?    Please advise -    Mary Ellen Jorge  Clinic Station

## 2019-03-27 NOTE — TELEPHONE ENCOUNTER
Discussed case with Dr Duke would not advise steroid injections this close to surgery date, may try visco injections.  Recommend that patient contact his surgeons office for final clearance.      Spoke to son discussed above.  He will contact Ortho Surgery team and return call to [facility], if there are any issues.    Rohan Dominguez ATC

## 2019-03-28 NOTE — PROGRESS NOTES
Jeff Plasencia  :  1923  DOS: 2019  MRN: [id number]    Sports Medicine Clinic Visit    PCP: Lorenza Smith    Jeff Plasencia is a 89 year old male who is seen  as a self referral presenting with chronic bilateral knee pain    Injury: Patient reports chronic knee pain, no injury noted.  Both knees are bothering him today, thinks they've bene aggravated from his PT for balance.  Has previously seen Anita Tompkins.  Per notes, last injection was cortisone, however, patient would like Synvisc because he doesn't think cortisone works.  Has had Synvisc in the past.    Location of Pain: medial and lateral joint line of both knees  Duration of Pain: 3 year(s)  Rating of Pain at worst: 3/10  Rating of Pain Currently: 2/10  Symptoms are better with: rest and injections  Symptoms are worse with: standing and walking  Additional Features:   Positive: grinding, catching and instability   Negative: swelling, bruising, paresthesias and numbness  Other evaluation and/or treatments so far consists of:    - right knee cortisone injections: 2015, 2016  - left knee cortisone injections: 2014, 2015  - left knee Synvisc One injection: 10/20/14  Prior History of related problems: none    Social History: retired    Interim History - 2019  Since last visit on 17 patient has moderate bilateral knee pain, left>>>right over the last ~ 3 months.  Bilateral knee SynviscOne injection completed on 17 provided good relief for ~ 18 months.  Patient desires repeat injection today.  Patient reports that tripped over steps and fall breaking his right hip ~ 10 days ago.  He had partial hip replacement completed on 3/20 by [facility].  Doing well post-op and is currently in a TCU.  No new injury in the interim.    Review of Systems  Skin: no bruising, no swelling  Musculoskeletal: as above  Neurologic: no numbness, paresthesias  Remainder of review of systems is negative  "including constitutional, CV, pulmonary, GI, except as noted in HPI or medical history.    Patient's current problem list, past medical and surgical history, and family history were reviewed.    Patient Active Problem List   Diagnosis     CANCER   PROSTATE     Dermatophytosis of body     Hx of supraventricular tachycardia     Hyperlipidemia LDL goal <130     Advanced directives, counseling/discussion     Memory loss     Radiculopathy of leg     At high risk for falls     Tremor     Other chronic rhinitis     Past Medical History:   Diagnosis Date     Basal cell carcinoma      BENIGN ESSENTIAL TREMOR     Did not tolerate propranolol     Calculus of kidney      Herpes zoster without mention of complication     Right T7-10 2003     Impotence of organic origin      Parotid mass 3/26/2009    CT 3/09- 2.6x 2.8 x2.9 cm Soft tissue mass within the left parotid gland. Biopsies negative.      Undiagnosed cardiac murmurs 2/9/2007    Echo 10/08 normal.      Past Surgical History:   Procedure Laterality Date     COLONOSCOPY  9/28/2011    Procedure:COLONOSCOPY; Colonoscopy  ; Surgeon:LAMAR ALLEN; Location:[facility]     EYE SURGERY       GENITOURINARY SURGERY       HERNIA REPAIR       REPAIR ECTROPION Right 4/12/2018    Procedure: REPAIR ECTROPION;  RIGHT LOWER LID ECTROPION REPAIR WITH SKIN GRAFT ;  Surgeon: Garry Ortiz MD;  Location: [facility]     SURGICAL HISTORY OF -   11/1999    Right cataract surgery     SURGICAL HISTORY OF -   12/1999    Hernia repair     SURGICAL HISTORY OF -   2001    Normal Colonoscopy     SURGICAL HISTORY OF -   8/2002    Cryosurgery of the prostate     SURGICAL HISTORY OF -   4/2003    Left cataract surgery     Family History   Problem Relation Age of Onset     Hypertension Brother      Diabetes Mother         age 70s     Cerebrovascular Disease Sister      Cancer - colorectal No family hx of      Melanoma No family hx of          Objective  /72   Ht 1.816 m (5' 11.5\")   Wt 86.2 kg (190 " lb)   BMI 26.13 kg/m      GENERAL APPEARANCE: healthy, alert and no distress   GAIT: NORMAL  SKIN: no suspicious lesions or rashes  HEENT: Sclera clear, anicteric  CV: good peripheral pulses  RESP: Breathing not labored  NEURO: Normal strength and tone, mentation intact and speech normal  PSYCH:  mentation appears normal and affect normal/bright    Bilateral Knee exam    Inspection:      Trace b/l effusion, generalized edema in right leg s/p hip surgery    Patella:      Mobility -       hypomobile bilateral       Crepitus noted in the patellofemoral joint bilateral        neg (-) compression test bilateral    Tender:      medial joint line bilateral       lateral joint line bilateral    Non Tender:      remainder of knee area bilateral    Knee ROM:      Decreased terminal active and passive ROM with flexion and extension bilateral    Strength:      5/5 with knee extension bilateral    Special Tests:     neg (-) Niecy bilateral       neg (-) anterior drawer bilateral       neg (-) posterior drawer bilateral       neg (-) varus at 0 and 30 bilateral       neg (-) valgus at 0 and 30 bilateral    Neurovascular:      2+ peripheral pulses bilaterally and brisk capillary refill       sensation grossly intact    Radiology  I ordered, visualized and reviewed these images with the patient  XR KNEE BILATERAL 3 VW   8/2/2017 3:04 PM      HISTORY: Pain in right knee, Pain in left knee     COMPARISON: 6/5/2014         IMPRESSION:  Right knee: Moderate to severe joint space narrowing at the medial  compartment of the right knee.     Left knee: Moderate to severe joint space narrowing at the lateral  compartment of the left knee. Moderate to severe joint space narrowing  at the lateral aspects of the left patellofemoral compartment. Lateral  patellar tilt.      Large Joint Injection/Arthocentesis: bilateral knee  Date/Time: 3/28/2019 2:50 PM  Performed by: Tom Duke DO  Authorized by: Tom Duke DO      Indications:  Osteoarthritis  Needle Size:  21 G  Guidance: ultrasound    Approach:  Superolateral  Location:  Knee  Laterality:  Bilateral  Site:  Bilateral knee  Medications (Right):  48 mg hylan 48 MG/6ML  Medications (Left):  48 mg hylan 48 MG/6ML  Aspirate amount (mL):  6  Aspirate:  Serous and yellow  Outcome:  Tolerated well, no immediate complications  Procedure discussed: discussed risks, benefits, and alternatives    Consent Given by:  Patient  Prep: patient was prepped and draped in usual sterile fashion        Assessment:  1. Primary osteoarthritis of both knees      Plan:  F/u prn  Good result from last series of SynviscOne, repeated today with US guidance  XR images independently visualized and reviewed with patient today in clinic  Procedure ok with ortho surgeon who completed partial CLIFFORD  Reviewed activity modification and progressive increase in activity as tolerated and guided by pain  Reviewed options for potential steroid vs viscosupplementation injections and the possibility for future orthopedic referral prn  Reviewed safe and appropriate OTC medication choices, try tylenol first  Up to 3000mg daily of tylenol is generally safe, NSAID dosing and duration limitations reviewed  Discussed nature of degenerative arthrosis of the knee.   Discussed symptom treatment with ice or heat, topical treatments, and rest if needed.   Expectations and limitations of synvisc were reviewed in detail  Often 4-6 weeks before full effect may be noticed  Usually covered up to every 6 months by insurance, but does not need to be repeated unless pain returns, at which point we would re-evaluate  Potential use of CSI in future for flares of pain reviewed in detail  Encouraged modified progressive pain-free activity as tolerated  HEP and Supportive care reviewed  All questions were answered today  Contact us with additional questions or concerns  Signs and sx of concern reviewed      Tom Duke DO, CAQ  Primary  Care Sports Medicine  [facility]

## 2019-03-28 NOTE — LETTER
3/28/2019         RE: Jeff Plasencia  [address] [facility] [address]        Dear Colleague,    Thank you for referring your patient, Jeff Plasencia, to the [facility]. Please see a copy of my visit note below.    Jeff Plasencia  :  1923  DOS: 2019  MRN: [id number]    Sports Medicine Clinic Visit    PCP: Lorenza Smith    Jeff Plasencia is a 89 year old male who is seen  as a self referral presenting with chronic bilateral knee pain    Injury: Patient reports chronic knee pain, no injury noted.  Both knees are bothering him today, thinks they've bene aggravated from his PT for balance.  Has previously seen Anita Tompkins.  Per notes, last injection was cortisone, however, patient would like Synvisc because he doesn't think cortisone works.  Has had Synvisc in the past.    Location of Pain: medial and lateral joint line of both knees  Duration of Pain: 3 year(s)  Rating of Pain at worst: 3/10  Rating of Pain Currently: 2/10  Symptoms are better with: rest and injections  Symptoms are worse with: standing and walking  Additional Features:   Positive: grinding, catching and instability   Negative: swelling, bruising, paresthesias and numbness  Other evaluation and/or treatments so far consists of:    - right knee cortisone injections: 2015, 2016  - left knee cortisone injections: 2014, 2015  - left knee Synvisc One injection: 10/20/14  Prior History of related problems: none    Social History: retired    Interim History - 2019  Since last visit on 17 patient has moderate bilateral knee pain, left>>>right over the last ~ 3 months.  Bilateral knee SynviscOne injection completed on 17 provided good relief for ~ 18 months.  Patient desires repeat injection today.  Patient reports that tripped over steps and fall breaking his right hip ~ 10 days ago.  He had partial hip replacement completed on 3/20 by  [facility].  Doing well post-op and is currently in a TCU.  No new injury in the interim.    Review of Systems  Skin: no bruising, no swelling  Musculoskeletal: as above  Neurologic: no numbness, paresthesias  Remainder of review of systems is negative including constitutional, CV, pulmonary, GI, except as noted in HPI or medical history.    Patient's current problem list, past medical and surgical history, and family history were reviewed.    Patient Active Problem List   Diagnosis     CANCER   PROSTATE     Dermatophytosis of body     Hx of supraventricular tachycardia     Hyperlipidemia LDL goal <130     Advanced directives, counseling/discussion     Memory loss     Radiculopathy of leg     At high risk for falls     Tremor     Other chronic rhinitis     Past Medical History:   Diagnosis Date     Basal cell carcinoma      BENIGN ESSENTIAL TREMOR     Did not tolerate propranolol     Calculus of kidney      Herpes zoster without mention of complication     Right T7-10 2003     Impotence of organic origin      Parotid mass 3/26/2009    CT 3/09- 2.6x 2.8 x2.9 cm Soft tissue mass within the left parotid gland. Biopsies negative.      Undiagnosed cardiac murmurs 2/9/2007    Echo 10/08 normal.      Past Surgical History:   Procedure Laterality Date     COLONOSCOPY  9/28/2011    Procedure:COLONOSCOPY; Colonoscopy  ; Surgeon:LAMAR ALLEN; Location:[facility]     EYE SURGERY       GENITOURINARY SURGERY       HERNIA REPAIR       REPAIR ECTROPION Right 4/12/2018    Procedure: REPAIR ECTROPION;  RIGHT LOWER LID ECTROPION REPAIR WITH SKIN GRAFT ;  Surgeon: Garry Ortiz MD;  Location: [facility]     SURGICAL HISTORY OF -   11/1999    Right cataract surgery     SURGICAL HISTORY OF -   12/1999    Hernia repair     SURGICAL HISTORY OF -   2001    Normal Colonoscopy     SURGICAL HISTORY OF -   8/2002    Cryosurgery of the prostate     SURGICAL HISTORY OF -   4/2003    Left cataract surgery     Family History   Problem  "Relation Age of Onset     Hypertension Brother      Diabetes Mother         age 70s     Cerebrovascular Disease Sister      Cancer - colorectal No family hx of      Melanoma No family hx of          Objective  /72   Ht 1.816 m (5' 11.5\")   Wt 86.2 kg (190 lb)   BMI 26.13 kg/m       GENERAL APPEARANCE: healthy, alert and no distress   GAIT: NORMAL  SKIN: no suspicious lesions or rashes  HEENT: Sclera clear, anicteric  CV: good peripheral pulses  RESP: Breathing not labored  NEURO: Normal strength and tone, mentation intact and speech normal  PSYCH:  mentation appears normal and affect normal/bright    Bilateral Knee exam    Inspection:      Trace b/l effusion, generalized edema in right leg s/p hip surgery    Patella:      Mobility -       hypomobile bilateral       Crepitus noted in the patellofemoral joint bilateral        neg (-) compression test bilateral    Tender:      medial joint line bilateral       lateral joint line bilateral    Non Tender:      remainder of knee area bilateral    Knee ROM:      Decreased terminal active and passive ROM with flexion and extension bilateral    Strength:      5/5 with knee extension bilateral    Special Tests:     neg (-) Niecy bilateral       neg (-) anterior drawer bilateral       neg (-) posterior drawer bilateral       neg (-) varus at 0 and 30 bilateral       neg (-) valgus at 0 and 30 bilateral    Neurovascular:      2+ peripheral pulses bilaterally and brisk capillary refill       sensation grossly intact    Radiology  I ordered, visualized and reviewed these images with the patient  XR KNEE BILATERAL 3 VW   8/2/2017 3:04 PM      HISTORY: Pain in right knee, Pain in left knee     COMPARISON: 6/5/2014         IMPRESSION:  Right knee: Moderate to severe joint space narrowing at the medial  compartment of the right knee.     Left knee: Moderate to severe joint space narrowing at the lateral  compartment of the left knee. Moderate to severe joint space " narrowing  at the lateral aspects of the left patellofemoral compartment. Lateral  patellar tilt.      Large Joint Injection/Arthocentesis: bilateral knee  Date/Time: 3/28/2019 2:50 PM  Performed by: Tom Duke DO  Authorized by: Tom Duke DO     Indications:  Osteoarthritis  Needle Size:  21 G  Guidance: ultrasound    Approach:  Superolateral  Location:  Knee  Laterality:  Bilateral  Site:  Bilateral knee  Medications (Right):  48 mg hylan 48 MG/6ML  Medications (Left):  48 mg hylan 48 MG/6ML  Aspirate amount (mL):  6  Aspirate:  Serous and yellow  Outcome:  Tolerated well, no immediate complications  Procedure discussed: discussed risks, benefits, and alternatives    Consent Given by:  Patient  Prep: patient was prepped and draped in usual sterile fashion        Assessment:  1. Primary osteoarthritis of both knees      Plan:  F/u prn  Good result from last series of SynviscOne, repeated today with US guidance  XR images independently visualized and reviewed with patient today in clinic  Procedure ok with ortho surgeon who completed partial CLIFFORD  Reviewed activity modification and progressive increase in activity as tolerated and guided by pain  Reviewed options for potential steroid vs viscosupplementation injections and the possibility for future orthopedic referral prn  Reviewed safe and appropriate OTC medication choices, try tylenol first  Up to 3000mg daily of tylenol is generally safe, NSAID dosing and duration limitations reviewed  Discussed nature of degenerative arthrosis of the knee.   Discussed symptom treatment with ice or heat, topical treatments, and rest if needed.   Expectations and limitations of synvisc were reviewed in detail  Often 4-6 weeks before full effect may be noticed  Usually covered up to every 6 months by insurance, but does not need to be repeated unless pain returns, at which point we would re-evaluate  Potential use of CSI in future for flares of pain  reviewed in detail  Encouraged modified progressive pain-free activity as tolerated  HEP and Supportive care reviewed  All questions were answered today  Contact us with additional questions or concerns  Signs and sx of concern reviewed      Tom Duke DO, CAQ  Primary Care Sports Medicine  [facility]         Again, thank you for allowing me to participate in the care of your patient.        Sincerely,        Tom Duke DO

## 2019-04-03 NOTE — LETTER
4/3/2019        RE: Jeff Plasencia  [address] [facility] [place] [address]        [facility] DISCHARGE SUMMARY  PATIENT'S NAME: Jeff Plasencia  YOB: 1923  MEDICAL RECORD NUMBER:  [id number]  Place of Service where encounter took place:  [facility] [[id number]]    PRIMARY CARE PROVIDER AND CLINIC RESPONSIBLE AFTER TRANSFER:   Mary Ellen Rivas, [facility] [place] [address] / [place] [address]    Non-[facility] Provider     Transferring providers: VENECIA Dela Cruz CNP, Dr Lilo MD  Recent Hospitalization/ED:  Hospital  [facility] [place] stay 3/19/19 to 3/23/19.  Date of SNF Admission: March / 25 / 2019  Date of SNF (anticipated) Discharge: April / 05 / 2019  Discharged to: previous independent home  Cognitive Scores: SLUMS: 24/30, CPT: 5.2/6.0, MOCA: 24/30 and ACL: 5.4/5.8    CODE STATUS/ADVANCE DIRECTIVES DISCUSSION:  DNR   ALLERGIES: Mustard oil; Penicillins; Atenolol; Cozaar [losartan potassium]; Inderal [propranolol hcl]; and Lisinopril    DISCHARGE DIAGNOSIS/NURSING FACILITY COURSE:    Status post closed fracture of right hip  (primary encounter diagnosis)  Comment: Pt sustained a right hip fracture after a fall. He was hospitalized 3/19-3/23. He had a right hemiarthroplasty with posterior approach. He has had minimal post op pain and reports he has not need pain medication since admission to TCU. Has worked with physical therapy and is up independently. No falls at TCU stay.   Plan: Will discontinue oxycodone as patient is not using. Will discontinue home with physical therapy and OT.   Continue ASA. Right hip drsg with no signs of infection. Patient to f/u with surgeon as scheduled.     (I10) Essential hypertension  (E78.5) Hyperlipidemia LDL goal <130  Comment: Blood pressure labile in -150. On no b/p meds.   Plan: no change    (M17.0) Primary osteoarthritis of both knees  Comment: Pain in knees worsened with physical therapy for  hip fx. Patient received bilateral Synvisc injections on 3/28 at [facility]  Plan: stable. No change    (R41.3) Memory loss  Comment: See cog scores above. Mild. Safety eval patient ok to go home  Plan: patient to discharge home with therapy    Past Medical History:  has a past medical history of Basal cell carcinoma, BENIGN ESSENTIAL TREMOR, Calculus of kidney, Herpes zoster without mention of complication, Impotence of organic origin, Parotid mass (3/26/2009), and Undiagnosed cardiac murmurs (2/9/2007). He also has no past medical history of Complication of anesthesia, Malignant hyperthermia, Malignant melanoma (H), PONV (postoperative nausea and vomiting), or Squamous cell carcinoma.    Discharge Medications:  Current Outpatient Medications   Medication Sig Dispense Refill     acetaminophen (TYLENOL) 325 MG tablet Take 325-650 mg by mouth every 4 hours as needed for mild pain       ASPIRIN 81 MG OR TABS 1/2 tablet by mouth daily 100 3     DEXTRAN 70-HYPROMELLOSE OP Apply 1 drop to eye daily as needed       Flaxseed Oil OIL 5 mLs daily       GLUCOSAMINE CHONDR 500 COMPLEX OR CAPS 1 capsule by mouth DAILY  0     LUTEIN PO Take 1 tablet by mouth daily        polyethylene glycol (MIRALAX/GLYCOLAX) packet Take 17 g by mouth daily       VITAMIN D PO Take 1,000 Units by mouth daily In Summer, 2000 units in winter         Medication Changes/Rationale:   - oxycodone stopped    Controlled medications sent with patient:   not applicable/none     ROS:   10 point ROS of systems including Constitutional, Eyes, Respiratory, Cardiovascular, Gastroenterology, Genitourinary, Integumentary, Musculoskeletal, Psychiatric were all negative except for pertinent positives noted in my HPI.    Physical Exam:   Vitals: /87   Pulse 82   Temp 98.2  F (36.8  C)   Resp 18   Wt 87.5 kg (193 lb)   SpO2 100%   BMI 26.54 kg/m     BMI= Body mass index is 26.54 kg/m .  GENERAL APPEARANCE:  Alert, in no distress  RESP:  respiratory effort  and palpation of chest normal, auscultation of lungs clear , no respiratory distress  CV:  Palpation and auscultation of heart done , rate and rhythm clear, no murmur, no peripheral edema  ABDOMEN:  normal bowel sounds, soft, nontender, no hepatosplenomegaly or other masses  M/S:   Gait and station steady, Digits and nails no concerns  SKIN:  Inspection and Palpation of skin and subcutaneous tissue no concerns. Right hip drsg dry with no surrounding erythema  NEURO: 2-12 in normal limits and at patient's baseline  PSYCH:  insight and judgement, memory intact , affect and mood normal      SNF labs:   Most Recent 3 CBC's:  Recent Labs   Lab Test 03/28/19  0908 06/12/18  0921 11/03/16  0835   WBC 6.7 5.5 5.4   HGB 10.4* 13.8 14.5   * 101* 98    184 204     Most Recent 3 BMP's:  Recent Labs   Lab Test 03/28/19  0908 06/12/18  0921 03/24/17  1008    140 139   POTASSIUM 3.7 4.2 4.1   CHLORIDE 102 105 106   CO2 30 29 27   BUN 27 26 25   CR 0.97 1.00 1.03   ANIONGAP 5 6 6   ELLIE 8.4* 8.2* 8.5   * 158* 125*     Most Recent 2 LFT's:  Recent Labs   Lab Test 03/28/19  0908 06/12/18  0921   AST 30 24   ALT 22 21   ALKPHOS 51 68   BILITOTAL 0.7 0.4       DISCHARGE PLAN:    Follow up labs: labs drawn on 4/4/19.     Medical Follow Up:      Follow up with PCP in 5-7 days    MTM referral needed and placed by this provider: No    Current [place] scheduled appointments:   f/u with sports meds as needed    Discharge Services: Home Care:  Occupational Therapy, Physical Therapy and From:  [facility]    Discharge Instructions Verbalized to Patient at Discharge:     None      1. Okay to discharge home with [facility] PT/OT  2. F/U with PCP in 5-7 days  3. Discontinue oxycodone    TOTAL DISCHARGE TIME:   Greater than 30 minutes  Electronically signed by:  VENECIA Dela Cruz CNP     Home care Face to Face documentation done in [facility] attached to Home care orders for [facility].                      Sincerely,        VENECIA Dela Cruz CNP

## 2019-04-03 NOTE — PROGRESS NOTES
[place] [facility] DISCHARGE SUMMARY  PATIENT'S NAME: Jeff Plasencia  YOB: 1923  MEDICAL RECORD NUMBER:  [id number]  Place of Service where encounter took place:  [facility] [[id number]]    PRIMARY CARE PROVIDER AND CLINIC RESPONSIBLE AFTER TRANSFER:   [facility]ivas, [facility] [place] [address] / [place] [address]    Non-[facility] Provider     Transferring providers: VENECIA Dela Cruz CNP, Dr Lilo MD  Recent Hospitalization/ED:  [facility] [place] stay 3/19/19 to 3/23/19.  Date of SNF Admission: March / 25 / 2019  Date of SNF (anticipated) Discharge: April / 05 / 2019  Discharged to: previous independent home  Cognitive Scores: SLUMS: 24/30, CPT: 5.2/6.0, MOCA: 24/30 and ACL: 5.4/5.8    CODE STATUS/ADVANCE DIRECTIVES DISCUSSION:  DNR   ALLERGIES: Mustard oil; Penicillins; Atenolol; Cozaar [losartan potassium]; Inderal [propranolol hcl]; and Lisinopril    DISCHARGE DIAGNOSIS/NURSING FACILITY COURSE:    Status post closed fracture of right hip  (primary encounter diagnosis)  Comment: Pt sustained a right hip fracture after a fall. He was hospitalized 3/19-3/23. He had a right hemiarthroplasty with posterior approach. He has had minimal post op pain and reports he has not need pain medication since admission to TCU. Has worked with physical therapy and is up independently. No falls at TCU stay.   Plan: Will discontinue oxycodone as patient is not using. Will discontinue home with physical therapy and OT.   Continue ASA. Right hip drsg with no signs of infection. Patient to f/u with surgeon as scheduled.     (I10) Essential hypertension  (E78.5) Hyperlipidemia LDL goal <130  Comment: Blood pressure labile in -150. On no b/p meds.   Plan: no change    (M17.0) Primary osteoarthritis of both knees  Comment: Pain in knees worsened with physical therapy for hip fx. Patient received bilateral Synvisc injections on 3/28 at [facility]  Plan: stable.  No change    (R41.3) Memory loss  Comment: See cog scores above. Mild. Safety eval patient ok to go home  Plan: patient to discharge home with therapy    Past Medical History:  has a past medical history of Basal cell carcinoma, BENIGN ESSENTIAL TREMOR, Calculus of kidney, Herpes zoster without mention of complication, Impotence of organic origin, Parotid mass (3/26/2009), and Undiagnosed cardiac murmurs (2/9/2007). He also has no past medical history of Complication of anesthesia, Malignant hyperthermia, Malignant melanoma (H), PONV (postoperative nausea and vomiting), or Squamous cell carcinoma.    Discharge Medications:  Current Outpatient Medications   Medication Sig Dispense Refill     acetaminophen (TYLENOL) 325 MG tablet Take 325-650 mg by mouth every 4 hours as needed for mild pain       ASPIRIN 81 MG OR TABS 1/2 tablet by mouth daily 100 3     DEXTRAN 70-HYPROMELLOSE OP Apply 1 drop to eye daily as needed       Flaxseed Oil OIL 5 mLs daily       GLUCOSAMINE CHONDR 500 COMPLEX OR CAPS 1 capsule by mouth DAILY  0     LUTEIN PO Take 1 tablet by mouth daily        polyethylene glycol (MIRALAX/GLYCOLAX) packet Take 17 g by mouth daily       VITAMIN D PO Take 1,000 Units by mouth daily In Summer, 2000 units in winter         Medication Changes/Rationale:   - oxycodone stopped    Controlled medications sent with patient:   not applicable/none     ROS:   10 point ROS of systems including Constitutional, Eyes, Respiratory, Cardiovascular, Gastroenterology, Genitourinary, Integumentary, Musculoskeletal, Psychiatric were all negative except for pertinent positives noted in my HPI.    Physical Exam:   Vitals: /87   Pulse 82   Temp 98.2  F (36.8  C)   Resp 18   Wt 87.5 kg (193 lb)   SpO2 100%   BMI 26.54 kg/m    BMI= Body mass index is 26.54 kg/m .  GENERAL APPEARANCE:  Alert, in no distress  RESP:  respiratory effort and palpation of chest normal, auscultation of lungs clear , no respiratory distress  CV:   Palpation and auscultation of heart done , rate and rhythm clear, no murmur, no peripheral edema  ABDOMEN:  normal bowel sounds, soft, nontender, no hepatosplenomegaly or other masses  M/S:   Gait and station steady, Digits and nails no concerns  SKIN:  Inspection and Palpation of skin and subcutaneous tissue no concerns. Right hip drsg dry with no surrounding erythema  NEURO: 2-12 in normal limits and at patient's baseline  PSYCH:  insight and judgement, memory intact , affect and mood normal      SNF labs:   Most Recent 3 CBC's:  Recent Labs   Lab Test 03/28/19  0908 06/12/18  0921 11/03/16  0835   WBC 6.7 5.5 5.4   HGB 10.4* 13.8 14.5   * 101* 98    184 204     Most Recent 3 BMP's:  Recent Labs   Lab Test 03/28/19  0908 06/12/18  0921 03/24/17  1008    140 139   POTASSIUM 3.7 4.2 4.1   CHLORIDE 102 105 106   CO2 30 29 27   BUN 27 26 25   CR 0.97 1.00 1.03   ANIONGAP 5 6 6   ELLIE 8.4* 8.2* 8.5   * 158* 125*     Most Recent 2 LFT's:  Recent Labs   Lab Test 03/28/19  0908 06/12/18  0921   AST 30 24   ALT 22 21   ALKPHOS 51 68   BILITOTAL 0.7 0.4       DISCHARGE PLAN:    Follow up labs: labs drawn on 4/4/19.     Medical Follow Up:      Follow up with PCP in 5-7 days    MTM referral needed and placed by this provider: No    Current [place] scheduled appointments:   f/u with sports meds as needed    Discharge Services: Home Care:  Occupational Therapy, Physical Therapy and From:  [facility]    Discharge Instructions Verbalized to Patient at Discharge:     None      1. Okay to discharge home with [place] PT/OT  2. F/U with PCP in 5-7 days  3. Discontinue oxycodone    TOTAL DISCHARGE TIME:   Greater than 30 minutes  Electronically signed by:  VENECIA Dela Cruz CNP     Home care Face to Face documentation done in [facility] attached to Home care orders for [facility].

## 2019-04-08 NOTE — TELEPHONE ENCOUNTER
Discharge to home 04-05-19 with homecare services.  Follows with [facility] provider.  CORTEZ Ricardo RN

## 2019-07-31 NOTE — LETTER
7/31/2019        RE: Jeff Plasencia  [address] [facility] [address]        [place] [facility]  PRIMARY CARE PROVIDER AND CLINIC:  Mary Ellen Rivas, [facility] [address] [facility] [address]  Chief Complaint   Patient presents with     Hospital F/U     [facility] Medical Record Number:  [id number]  Place of Service where encounter took place:  [facility] ([facility]) [[id number]]    Jeff Plasencia  is a 89 year old  (5/20/1923), admitted to the above facility from  [facility] [place] [place]. Hospital stay 7/10/19 through 7/30/19..  Admitted to this facility for  rehab, medical management and nursing care.    HPI:    HPI information obtained from: facility chart records, facility staff, [facility] chart review and family/first contact son/daughter report.   Brief Summary of Hospital Course: Jeff Plasencia is a 89 old gentleman with past medical history of hypertension, hyperlipidemia, tremor, and prostate cancer who presented after a fall at home.  He had had 3 falls in the 3 weeks prior to hospitalization.  His third fall he was found lying in his garden by neighbor likely 4 to 5 hours after the occurrence of the fall.  Imaging showed acute on chronic subdural hematoma with 14 mm shift.  Neurosurgery was consulted, no surgical intervention as family wished for conservative treatment.  Initially hopeful that patient would be appropriate for TCU, however continued to have decline during hospitalization, with agitation and confusion frequently require one-to-one monitoring.  Agitation difficult to manage, palliative care followed while inpatient and he was started on Seroquel, Haldol, and Zyprexa.  Continued to have poor appetite and decline, so family opted for hospice cares.  He was discharged to long-term care on 7/30/2019 and signed on to [facility] hospice on admission.  Updates on Status Since Skilled nursing Admission:   Patient seen today with hospice nurse.  He is somnolent,  minimal response to stimulation, but did not moan with repositioning and jerk his feet when touched during bed bath.  He is not swallowing any pills.  Has some occasional restlessness, but hospice states this is been well managed with his scheduled morphine and as needed Haldol.  Has been getting suppositories for constipation.  Was combative while in the hospital, but no combative behaviors noted currently.  Mottling noted over arms and legs, feet are cold.  Staff and family have noted multiple periods of apnea.  Does have some redness to coccyx, pains blanchable.  Per hospice nurse she is likely actively dying.  Has not eaten in 8 days, has not drank anything in 3 days.    CODE STATUS/ADVANCE DIRECTIVES DISCUSSION:   DNR / DNI, hospice patient.   Patient's living condition: lives in a skilled nursing facility  ALLERGIES: Mustard oil; Penicillins; Atenolol; Cozaar [losartan potassium]; Inderal [propranolol hcl]; and Lisinopril  PAST MEDICAL HISTORY:  has a past medical history of Basal cell carcinoma, BENIGN ESSENTIAL TREMOR, Calculus of kidney, Herpes zoster without mention of complication, Impotence of organic origin, Parotid mass (3/26/2009), and Undiagnosed cardiac murmurs (2/9/2007). He also has no past medical history of Complication of anesthesia, Malignant hyperthermia, Malignant melanoma (H), PONV (postoperative nausea and vomiting), or Squamous cell carcinoma.  PAST SURGICAL HISTORY:   has a past surgical history that includes surgical history of -  (11/1999); surgical history of -  (12/1999); surgical history of -  (2001); surgical history of -  (8/2002); surgical history of -  (4/2003); Colonoscopy (9/28/2011); hernia repair; Abdomen surgery; Eye surgery; and Repair ectropion (Right, 4/12/2018).  FAMILY HISTORY: family history includes Cerebrovascular Disease in his sister; Diabetes in his mother; Hypertension in his brother.  SOCIAL HISTORY:   reports that he has never smoked. He has never used  smokeless tobacco. He reports that he drinks alcohol. He reports that he does not use drugs.    Post Discharge Medication Reconciliation Status: discharge medications reconciled, continue medications without change    Current Outpatient Medications   Medication Sig Dispense Refill     artificial saliva (KELVIN-STOR) SOLN solution Swish and spit 2 sprays in mouth every 2 hours as needed for dry mouth       bisacodyl (DULCOLAX) 10 MG suppository Place 10 mg rectally daily as needed for constipation       haloperidol (HALDOL) 2 MG/ML (HIGH CONC) solution Take 2.5 mg by mouth every 4 hours as needed for agitation       hyoscyamine (LEVSIN) 0.125 MG/ML solution Take 0.125 mg by mouth every 4 hours as needed for cramping       morphine sulfate HIGH CONCENTRATE (ROXANOL) 20 mg/mL (HIGH CONC) solution Take 5 mg by mouth every 2 hours as needed for shortness of breath / dyspnea or breakthrough pain       morphine sulfate HIGH CONCENTRATE (ROXANOL) 20 mg/mL (HIGH CONC) solution Take 5 mg by mouth every 4 hours         ROS:  Unable to obtain due to actively dying.     Vitals:  Pulse 66   Resp 12  Other vital signs refused by family as patient is actively dying.   Exam:  GENERAL APPEARANCE:  somnolent, frail  ENT:  mucus membranes dry  RESP:  diminished breath sounds bilat bases, periods of apnea  CV:  Palpation and auscultation of heart done , regular rate and rhythm, no murmur, rub, or gallop, no edema, +2 pedal pulses  ABDOMEN:  hypoactie BS, belly soft, nontender  M/S:   non-ambulatory, no gross joint deformities  SKIN:  area of blanchable redness to cocyx, mottling noted to arms, legs, feet cold  NEURO:   jerks feet to touch, moans with movement,   PSYCH:  quiet, primarily non-verbal    Lab/Diagnostic data:  Recent labs in [place] reviewed by me today.     ASSESSMENT/PLAN:  (R29.6) Recurrent falls  (primary encounter diagnosis)  (S06.5X9A) SDH (subdural hematoma) (H)  (R62.7) Adult failure to thrive  (Z51.5) Hospice care  patient  Comment: s/p multiple falls at home, appears to be actively dying.  Appropriate for hospice care.  Plan: Continue scheduled morphine, PRN morphine, and PRN Haldol.  Zyprexa discontinued by hospice as likely not needed.  Continue current skilled nursing cares, continue hospice cares.    (I10) Essential hypertension  Comment: Historical, Coreg discontinued as unable to swallow medications.  Plan: No need to monitor, treat patient symptoms for comfort.    (K59.01) Slow transit constipation  Comment: Likely due to narcotics, impaired mobility, and weakening muscle tone.  Plan: Suppositories as needed, has not eaten in 8 days so suspect only limited bowel movements.    Orders written by provider at facility  Scrub mouth and perform oral cares every 2 hours, use biotene as ordered to moisten mouth    Total time spent with patient visit at the [facility] nursing [facility] was 40 including patient visit, review of past records and bedside discussion with patient family. Greater than 50% of total time spent with counseling and coordinating care due to coordinating care with nurse on admission orders, the plan of SNF stay and projected length of stay, current medication reconciled from the hospital, current pain control plan and controlled substances ordered and taper plan of care and disucssion or patient status and care coordination with hospice team.  Electronically signed by:  VENECIA Puga CNP     Disclaimer:  This note consists of symbols derived from keyboarding, dictation, and/or voice recognition software.  As a result, there may be errors in the script that have gone undetected.  Please consider this when interpreting information found in the chart.            Sincerely,        VENECIA Puga CNP

## 2019-07-31 NOTE — PROGRESS NOTES
[place] [facility]  PRIMARY CARE PROVIDER AND CLINIC:  Mary Ellen Rivas, [place] [id number] [facility] [address]  Chief Complaint   Patient presents with     Hospital F/U     [facility] [facility] Medical Record Number:  [id number]  Place of Service where encounter took place:  [facility] [[id number]]    Jeff Plasencia  is a 89 year old  (5/20/1923), admitted to the above facility from  [facility] [place]. Hospital stay 7/10/19 through 7/30/19..  Admitted to this facility for  rehab, medical management and nursing care.    HPI:    HPI information obtained from: facility chart records, facility staff, [facility] chart review and family/first contact son/daughter report.   Brief Summary of Hospital Course: Jeff Plasencia is a 89 old gentleman with past medical history of hypertension, hyperlipidemia, tremor, and prostate cancer who presented after a fall at home.  He had had 3 falls in the 3 weeks prior to hospitalization.  His third fall he was found lying in his garden by neighbor likely 4 to 5 hours after the occurrence of the fall.  Imaging showed acute on chronic subdural hematoma with 14 mm shift.  Neurosurgery was consulted, no surgical intervention as family wished for conservative treatment.  Initially hopeful that patient would be appropriate for TCU, however continued to have decline during hospitalization, with agitation and confusion frequently require one-to-one monitoring.  Agitation difficult to manage, palliative care followed while inpatient and he was started on Seroquel, Haldol, and Zyprexa.  Continued to have poor appetite and decline, so family opted for hospice cares.  He was discharged to long-term care on 7/30/2019 and signed on to [facility] on admission.  Updates on Status Since Skilled nursing Admission:   Patient seen today with hospice nurse.  He is somnolent, minimal response to stimulation, but did not moan with repositioning and jerk his feet  when touched during bed bath.  He is not swallowing any pills.  Has some occasional restlessness, but hospice states this is been well managed with his scheduled morphine and as needed Haldol.  Has been getting suppositories for constipation.  Was combative while in the hospital, but no combative behaviors noted currently.  Mottling noted over arms and legs, feet are cold.  Staff and family have noted multiple periods of apnea.  Does have some redness to coccyx, pains blanchable.  Per hospice nurse she is likely actively dying.  Has not eaten in 8 days, has not drank anything in 3 days.    CODE STATUS/ADVANCE DIRECTIVES DISCUSSION:   DNR / DNI, hospice patient.   Patient's living condition: lives in a skilled nursing facility  ALLERGIES: Mustard oil; Penicillins; Atenolol; Cozaar [losartan potassium]; Inderal [propranolol hcl]; and Lisinopril  PAST MEDICAL HISTORY:  has a past medical history of Basal cell carcinoma, BENIGN ESSENTIAL TREMOR, Calculus of kidney, Herpes zoster without mention of complication, Impotence of organic origin, Parotid mass (3/26/2009), and Undiagnosed cardiac murmurs (2/9/2007). He also has no past medical history of Complication of anesthesia, Malignant hyperthermia, Malignant melanoma (H), PONV (postoperative nausea and vomiting), or Squamous cell carcinoma.  PAST SURGICAL HISTORY:   has a past surgical history that includes surgical history of -  (11/1999); surgical history of -  (12/1999); surgical history of -  (2001); surgical history of -  (8/2002); surgical history of -  (4/2003); Colonoscopy (9/28/2011); hernia repair; Abdomen surgery; Eye surgery; and Repair ectropion (Right, 4/12/2018).  FAMILY HISTORY: family history includes Cerebrovascular Disease in his sister; Diabetes in his mother; Hypertension in his brother.  SOCIAL HISTORY:   reports that he has never smoked. He has never used smokeless tobacco. He reports that he drinks alcohol. He reports that he does not use  drugs.    Post Discharge Medication Reconciliation Status: discharge medications reconciled, continue medications without change    Current Outpatient Medications   Medication Sig Dispense Refill     artificial saliva (KELVIN-STOR) SOLN solution Swish and spit 2 sprays in mouth every 2 hours as needed for dry mouth       bisacodyl (DULCOLAX) 10 MG suppository Place 10 mg rectally daily as needed for constipation       haloperidol (HALDOL) 2 MG/ML (HIGH CONC) solution Take 2.5 mg by mouth every 4 hours as needed for agitation       hyoscyamine (LEVSIN) 0.125 MG/ML solution Take 0.125 mg by mouth every 4 hours as needed for cramping       morphine sulfate HIGH CONCENTRATE (ROXANOL) 20 mg/mL (HIGH CONC) solution Take 5 mg by mouth every 2 hours as needed for shortness of breath / dyspnea or breakthrough pain       morphine sulfate HIGH CONCENTRATE (ROXANOL) 20 mg/mL (HIGH CONC) solution Take 5 mg by mouth every 4 hours         ROS:  Unable to obtain due to actively dying.     Vitals:  Pulse 66   Resp 12  Other vital signs refused by family as patient is actively dying.   Exam:  GENERAL APPEARANCE:  somnolent, frail  ENT:  mucus membranes dry  RESP:  diminished breath sounds bilat bases, periods of apnea  CV:  Palpation and auscultation of heart done , regular rate and rhythm, no murmur, rub, or gallop, no edema, +2 pedal pulses  ABDOMEN:  hypoactie BS, belly soft, nontender  M/S:   non-ambulatory, no gross joint deformities  SKIN:  area of blanchable redness to cocyx, mottling noted to arms, legs, feet cold  NEURO:   jerks feet to touch, moans with movement,   PSYCH:  quiet, primarily non-verbal    Lab/Diagnostic data:  Recent labs in [facility] reviewed by me today.     ASSESSMENT/PLAN:  (R29.6) Recurrent falls  (primary encounter diagnosis)  (S06.5X9A) SDH (subdural hematoma) (H)  (R62.7) Adult failure to thrive  (Z51.5) Hospice care patient  Comment: s/p multiple falls at home, appears to be actively dying.  Appropriate for  hospice care.  Plan: Continue scheduled morphine, PRN morphine, and PRN Haldol.  Zyprexa discontinued by hospice as likely not needed.  Continue current skilled nursing cares, continue hospice cares.    (I10) Essential hypertension  Comment: Historical, Coreg discontinued as unable to swallow medications.  Plan: No need to monitor, treat patient symptoms for comfort.    (K59.01) Slow transit constipation  Comment: Likely due to narcotics, impaired mobility, and weakening muscle tone.  Plan: Suppositories as needed, has not eaten in 8 days so suspect only limited bowel movements.    Orders written by provider at facility  Scrub mouth and perform oral cares every 2 hours, use biotene as ordered to moisten mouth    Total time spent with patient visit at the [facility] nursing [facility] was 40 including patient visit, review of past records and bedside discussion with patient family. Greater than 50% of total time spent with counseling and coordinating care due to coordinating care with nurse on admission orders, the plan of SNF stay and projected length of stay, current medication reconciled from the hospital, current pain control plan and controlled substances ordered and taper plan of care and disucssion or patient status and care coordination with hospice team.  Electronically signed by:  VENECIA Puga CNP     Disclaimer:  This note consists of symbols derived from keyboarding, dictation, and/or voice recognition software.  As a result, there may be errors in the script that have gone undetected.  Please consider this when interpreting information found in the chart.

## 2019-08-05 ENCOUNTER — MEDICAL CORRESPONDENCE (OUTPATIENT)
Dept: HEALTH INFORMATION MANAGEMENT | Facility: CLINIC | Age: 84
End: 2019-08-05

## 2021-10-29 NOTE — NURSING NOTE
"Chief Complaint   Patient presents with     Hypotension       Initial /52 (Cuff Size: Adult Regular)  Pulse 88  Temp 97.8  F (36.6  C) (Tympanic)  Resp 18  Ht 5' 11\" (1.803 m)  Wt 185 lb (83.9 kg)  SpO2 98%  BMI 25.8 kg/m2 Estimated body mass index is 25.8 kg/(m^2) as calculated from the following:    Height as of this encounter: 5' 11\" (1.803 m).    Weight as of this encounter: 185 lb (83.9 kg).      Health Maintenance that is potentially due pending provider review:  NONE    n/a    Is there anyone who you would like to be able to receive your results? Not Applicable  If yes have patient fill out ALY    " DOS: 10/29/2021  NAME: David Austin Jr.   : 1948  PCP: Richard Roman MD    Chief Complaint   Patient presents with   • Tremors      Referring MD: No ref. provider found    Neurological Problem:  73 y.o. lt handed male with a Hx of hypertension, CAD, hyperlipidemia, tobacco abuse who presents today for tremor follow-up.  Patient and problem are new to me.  History is provided by the patient and review of records as summarized below.    Interval History:  Mr. Austin was previously seen in our office by Dr. Annemarie Correa initially on 10/13/2020 and again on 2021 for evaluation of tremor.  He reported a tremor of his left hand and in his stomach.  In  she noted that the patient had a very minimal action tremor of his left hand during her exam.  She felt that this was likely a mild essential tremor versus an enhanced physiologic tremor.  Given the tremor was not affecting the patient's daily life she recommended holding off on starting any medication treatment and to continue to monitor.  TSH level was recommended.  The patient then followed up in May 2021 and again had no change with his tremor.  His TSH level was normal.    Today he presents and reports he has still not noticed any change in his tremor.  The tremoring in his stomach is actually not reoccurred.  The tremoring in his left hand is occasional.  He reports sometimes he will be watching TV and noticed that his left hand will start tremoring.  He denies any other areas affected by the tremor.  He reports the tremor does not affect his daily living.  He is able to hold a cup and drink from it without tremoring.  He did suffer a fall a couple of days ago while being on a 2 step ladder.  He denies any weakness or tremoring causing the fall but states that he got tripped up as he was turning around to step off of it.  He denies any injury from the fall.  He denies any slow movements, rigidity, decreased sense of smell, difficulty  "swallowing, or shuffling of his gait.  He does smoke.    Review of Systems:        Review of Systems   Musculoskeletal: Negative for gait problem.   Neurological: Positive for tremors. Negative for dizziness, seizures, syncope, facial asymmetry, speech difficulty, weakness, light-headedness, numbness and headaches.   Hematological: Does not bruise/bleed easily.   Psychiatric/Behavioral: Negative for agitation, behavioral problems, confusion, decreased concentration, dysphoric mood, hallucinations, self-injury, sleep disturbance and suicidal ideas. The patient is not nervous/anxious and is not hyperactive.        \"The following portions of the patient's history were reviewed and updated as appropriate: allergies, current medications, past family history, past medical history, past social history, past surgical history and problem list.\"    Laboratory Results:             No results found for: HGBA1C  No results found for: CHOL  No results found for: HDL  No results found for: LDL  No results found for: TRIG  No components found for: CHOLHDL  No results found for: RPR  Lab Results   Component Value Date    TSH 2.33 04/28/2015     No results found for: SEIBEFGZ40  Lab Results   Component Value Date    GLUCOSE 125 (H) 03/06/2020    BUN 16 03/06/2020    CREATININE 0.90 03/06/2020    EGFRIFNONA 83 03/06/2020    BCR 17.8 03/06/2020    K 4.2 03/06/2020    CO2 21.8 (L) 03/06/2020    CALCIUM 8.5 (L) 03/06/2020    ALBUMIN 4.20 04/06/2018    AST 14 04/06/2018    ALT 29 04/06/2018     Lab Results   Component Value Date    WBC 9.30 03/06/2020    HGB 14.1 03/06/2020    HCT 41.3 03/06/2020    MCV 86.4 03/06/2020     03/06/2020     No results found for: INR, PROTIME    Physical Examination:   mRS:   General Appearance:   Elderly male, appears older than stated age, pleasant, well developed, well nourished, well groomed, alert, and cooperative.  HEENT: Normocephalic. Atraumatic.   Extremities:    No obvious " edema.  Respiratory:   Even and unlabored.    Neurological examination:  Higher Integrative  Function: Oriented to time, place and person.  Able to correctly identify wall clock.  Normal attention span and concentration.  Difficult time writing-patient states he cannot spell.  Was able to write his name without notice of a tremor.  Normal spontaneous speech.    CN II: Normal visual acuity and visual fields.    CN III IV VI: Extraocular movements are full without nystagmus.   CN V: Normal facial sensation and strength of muscles of mastication.  CN VII: Facial movements are symmetric. No weakness.  CN VIII:   Auditory acuity is abnormal. [place] bilaterally.  CN IX & X:   Symmetric palatal movement.  CN XI: Sternocleidomastoid and trapezius are normal.  No weakness.  CN XII:   The tongue is midline.  No atrophy or fasciculations.  Motor: Normal muscle strength, bulk and tone in upper and lower extremities.  No fasciculations, rigidity, spasticity, or abnormal movements.  Sensation: Decreased temperature sensation and bilateral hands.  Normal to light touch in arms and legs.  Normal to vibration in arms and legs.    Station and Gait: Normal arm swing.  Mild kyphotic posture.  Coordination: Finger to nose test shows no dysmetria.  Rapid alternating movements are normal.      Impression/Assessment:    Mr. Austin presents today for tremor follow-up.  He has had no change or worsening of his tremor since being seen in the office last back in April.  The tremor still happens intermittently and does not affect his daily living.  I saw no signs of parkinsonism or Parkinson's disease.  He denied any instances of bradykinesia, rigidity, decrease in sense of smell, dysphagia, or shuffling of his gait.  His handwriting was well.  I noticed no tremor throughout our exam.  I discussed with the patient about treatment options for tremor if his tremor ever were to worsen but at this time I would continue holding off on any treatment  given that the tremor is very minimal and does not affect his daily living.  I did inform him that gabapentin has a side effect of tremor and if he were to ever have to escalate his dose and noticed that his tremor worsened this may be the cause of it.  I asked that he follow-up with me as needed if symptoms worsen.  He agreed with this plan.    Plan:    Continue to monitor for progression and hold off on starting any treatment.    Monitor gabapentin use as tremor is a side effect.  Follow-up if symptoms worsen.    Diagnoses and all orders for this visit:    1. Intermittent tremor (Primary)        MDM  Reviewed: previous chart and vitals  Reviewed previous: labs  Interpretation: labs        COLBY Bundy

## 2024-08-15 NOTE — TELEPHONE ENCOUNTER
I reached hai again to let him know that we were able to reach the triage nurse at the [facility] and she did say that the report would come from the ordering physician at the [facility].  This provider is Dr. Dawn Goel. They will be calling Hai once they obtain the report. Cee SEBASTIAN RN    4

## 2024-09-17 NOTE — TELEPHONE ENCOUNTER
Mr. Adrianrichelle was advised of Dr. Garcias's recommendation.  He would like to come in after he sees the eye doctor.  His wife has an appointment in [place] on 5/25/17, so we will see him that day at 1245.     caffeine

## (undated) DEVICE — SU CHROMIC 6-0 G-1 18" 790G

## (undated) DEVICE — GLOVE PROTEXIS W/NEU-THERA 7.5  2D73TE75

## (undated) DEVICE — PACK OCULOPLATIC SEN15OCFSD

## (undated) DEVICE — ESU EYE HIGH TEMP 65410-183

## (undated) DEVICE — SU PLAIN 6-0 G-1DA 18" 770G

## (undated) DEVICE — SOL NACL 0.9% IRRIG 1000ML BOTTLE 07138-09

## (undated) DEVICE — SU PROLENE 4-0 RB-1DA 36" 8557H

## (undated) DEVICE — SU PDS II 5-0 RB-2DA 30" Z148H

## (undated) DEVICE — SU VICRYL 5-0 P-1 18" UND J490G

## (undated) DEVICE — LINEN TOWEL PACK X5 5464

## (undated) RX ORDER — ONDANSETRON 2 MG/ML
INJECTION INTRAMUSCULAR; INTRAVENOUS
Status: DISPENSED
Start: 2018-04-12

## (undated) RX ORDER — DEXAMETHASONE SODIUM PHOSPHATE 4 MG/ML
INJECTION, SOLUTION INTRA-ARTICULAR; INTRALESIONAL; INTRAMUSCULAR; INTRAVENOUS; SOFT TISSUE
Status: DISPENSED
Start: 2018-04-12

## (undated) RX ORDER — LIDOCAINE HYDROCHLORIDE 20 MG/ML
INJECTION, SOLUTION EPIDURAL; INFILTRATION; INTRACAUDAL; PERINEURAL
Status: DISPENSED
Start: 2018-04-12

## (undated) RX ORDER — FENTANYL CITRATE 50 UG/ML
INJECTION, SOLUTION INTRAMUSCULAR; INTRAVENOUS
Status: DISPENSED
Start: 2018-04-12